# Patient Record
Sex: FEMALE | Race: WHITE | NOT HISPANIC OR LATINO | Employment: OTHER | URBAN - METROPOLITAN AREA
[De-identification: names, ages, dates, MRNs, and addresses within clinical notes are randomized per-mention and may not be internally consistent; named-entity substitution may affect disease eponyms.]

---

## 2017-07-07 ENCOUNTER — OFFICE VISIT (OUTPATIENT)
Dept: CARDIOLOGY | Facility: CLINIC | Age: 82
End: 2017-07-07
Payer: MEDICARE

## 2017-07-07 VITALS
HEART RATE: 93 BPM | HEIGHT: 63 IN | SYSTOLIC BLOOD PRESSURE: 106 MMHG | BODY MASS INDEX: 29.06 KG/M2 | WEIGHT: 164 LBS | DIASTOLIC BLOOD PRESSURE: 66 MMHG

## 2017-07-07 DIAGNOSIS — I48.20 CHRONIC ATRIAL FIBRILLATION (H): Primary | ICD-10-CM

## 2017-07-07 DIAGNOSIS — I10 BENIGN ESSENTIAL HYPERTENSION: ICD-10-CM

## 2017-07-07 PROCEDURE — 99213 OFFICE O/P EST LOW 20 MIN: CPT | Performed by: INTERNAL MEDICINE

## 2017-07-07 NOTE — MR AVS SNAPSHOT
"              After Visit Summary   7/7/2017    Marcia Gary    MRN: [id number]           Patient Information     Date Of Birth          1935        Visit Information        Provider Department      7/7/2017 2:15 PM Joseph Tolliver MD [facility]        Today's Diagnoses     Chronic atrial fibrillation (H)    -  1    Benign essential hypertension           Follow-ups after your visit        Additional Services     Follow-Up with Cardiologist                 Future tests that were ordered for you today     Open Future Orders        Priority Expected Expires Ordered    Follow-Up with Cardiologist Routine 7/7/2018 11/19/2018 7/7/2017            Who to contact     If you have questions or need follow up information about today's clinic visit or your schedule please contact [facility] directly at [phone].  Normal or non-critical lab and imaging results will be communicated to you by [employer], letter or phone within 4 business days after the clinic has received the results. If you do not hear from us within 7 days, please contact the clinic through [employer] or phone. If you have a critical or abnormal lab result, we will notify you by phone as soon as possible.  Submit refill requests through [employer] or call your pharmacy and they will forward the refill request to us. Please allow 3 business days for your refill to be completed.          Additional Information About Your Visit        [employer] Information     [employer] lets you send messages to your doctor, view your test results, renew your prescriptions, schedule appointments and more. To sign up, go to [email or web address] . Click on \"Log in\" on the left side of the screen, which will take you to the Welcome page. Then click on \"Sign up Now\" on the right side of the page.     You will be asked to enter the access code listed below, as well as some personal information. " "Please follow the directions to create your username and password.     Your access code is: [email or web address]  Expires: 10/5/2017  2:42 PM     Your access code will  in 90 days. If you need help or a new code, please call your [place] clinic or [phone].        Care EveryWhere ID     This is your Care EveryWhere ID. This could be used by other organizations to access your [place] medical records  [id number]        Your Vitals Were     Pulse Height BMI (Body Mass Index)             93 1.6 m (5' 3\") 29.05 kg/m2          Blood Pressure from Last 3 Encounters:   17 106/66   16 122/60   05/05/15 118/64    Weight from Last 3 Encounters:   17 74.4 kg (164 lb)   16 76.7 kg (169 lb)   05/05/15 81.6 kg (180 lb)               Primary Care Provider Office Phone # Fax #    Joseph Pelayo [phone]       [facility] [address]  [place] [address]        Equal Access to Services     [facility] AH: Hadii aad ku hadasho Soomaali, waaxda luqadaha, qaybta kaalmada adeegyada, ricki rodriguez . So [facility] [phone].    ATENCIÓN: Si habla español, tiene a love disposición servicios gratuitos de asistencia lingüística. Llame al [phone].    We comply with applicable federal civil rights laws and [place] laws. We do not discriminate on the basis of race, color, national origin, age, disability sex, sexual orientation or gender identity.            Thank you!     Thank you for choosing [facility] [place]  for your care. Our goal is always to provide you with excellent care. Hearing back from our patients is one way we can continue to improve our services. Please take a few minutes to complete the written survey that you may receive in the mail after your visit with us. Thank you!             Your Updated Medication List - Protect others around you: Learn how to safely use, store and throw away your " medicines at www.disposemymeds.org.          This list is accurate as of: 7/7/17  2:42 PM.  Always use your most recent med list.                   Brand Name Dispense Instructions for use Diagnosis    calcium citrate-vitamin D 315-200 MG-UNIT Tabs per tablet    CITRACAL     Take 1 tablet by mouth daily        diltiazem 240 MG 24 hr capsule     90 capsule    Take 1 capsule (240 mg) by mouth daily    Essential hypertension, benign       ferrous sulfate 325 (65 FE) MG tablet    IRON     Take 325 mg by mouth Once a week        MULTI COMPLETE PO      approx once a week        OMEPRAZOLE PO      Take 20 mg by mouth 2 times daily (before meals)        rivaroxaban ANTICOAGULANT 15 MG Tabs tablet    XARELTO    90 tablet    Take 1 tablet (15 mg) by mouth daily (with dinner)    Paroxysmal atrial fibrillation (H)       VITAMIN D3 PO      Take 1,000 Units by mouth daily

## 2017-07-07 NOTE — LETTER
2017  Joseph Pelayo MD   [facility]   [address]   [place] [address]       RE:                Marcia Gary   MRN:             [id number]   :             1935       Dear Dr. Pelayo:       Marcia Gary, a 80-year-old woman with chronic atrial fibrillation, hypertension, dyslipidemia, scleroderma, rheumatoid arthritis and chronic kidney disease was seen today at your request for followup.       Ms. Gary has chronic atrial fibrillation with a CHADS-VASc score of 3.  Her creatinine clearance of 42 and she is currently on rivaroxaban 15 mg a day and diltiazem for rate control.       Since last seen, the patient denies chest, arm, neck, jaw or back discomfort with exertion or focal neurologic deficit in strength or sensation.      The patient still mows her lawn on a regular basis and is able to do so without limitation.       PAST MEDICAL HISTORY:   1.  Chronic atrial fibrillation.   2.  Hypertension.   3.  Sleep apnea.   4.  Rheumatoid arthritis.   5.  Scleroderma.   6.  Chronic kidney disease.       PHYSICAL EXAMINATION:   GENERAL:  Exam today demonstrates a very pleasant, cooperative and intelligent 80-year-old woman.   VITAL SIGNS:  Her blood pressure is 122/60, heart rate is 84 and irregular.  Her height is 1.6 meters.  Her weight is 77 kg.  Her BMI is 29.8.   LUNGS:  Clear to percussion and auscultation.   CARDIOVASCULAR:  Normal S1 with a normal S2, there is no S3.  There is no murmur, rub or click.       The patient describes a chronic cough without hemoptysis or purulent sputum.  There has been no exertional dyspnea.       ASSESSMENT:  Ms. Gary is free of cardiovascular symptoms.  Her ventricular response rate is well controlled.  She has been free of focal neurologic deficit.  Her systolic blood pressure is optimal.         I suggested she should continue to follow up with her primary care doctor on a regular basis.       RECOMMENDATIONS:   1.  Continue present cardiac  medications.   2.  Prescriptions have been refilled.   3.  Followup visit with me in 1 year.          We greatly appreciate the opportunity to see your patient, Ms. Abdirahman.       Sincerely,             SHANNON MALLOY MD

## 2017-07-07 NOTE — PROGRESS NOTES
2017  Joseph Pelayo MD   [facility]   [address]   [place] [address]       RE:                Marcia Gary   MRN:             [id number]   :             1935       Dear Dr. Pelayo:       Marcia Gary, a 80-year-old woman with chronic atrial fibrillation, hypertension, dyslipidemia, scleroderma, rheumatoid arthritis and chronic kidney disease was seen today at your request for followup.       Ms. Gary has chronic atrial fibrillation with a CHADS-VASc score of 3.  Her creatinine clearance of 42 and she is currently on rivaroxaban 15 mg a day and diltiazem for rate control.       Since last seen, the patient denies chest, arm, neck, jaw or back discomfort with exertion or focal neurologic deficit in strength or sensation.      The patient still mows her lawn on a regular basis and is able to do so without limitation.       PAST MEDICAL HISTORY:   1.  Chronic atrial fibrillation.   2.  Hypertension.   3.  Sleep apnea.   4.  Rheumatoid arthritis.   5.  Scleroderma.   6.  Chronic kidney disease.       PHYSICAL EXAMINATION:   GENERAL:  Exam today demonstrates a very pleasant, cooperative and intelligent 80-year-old woman.   VITAL SIGNS:  Her blood pressure is 122/60, heart rate is 84 and irregular.  Her height is 1.6 meters.  Her weight is 77 kg.  Her BMI is 29.8.   LUNGS:  Clear to percussion and auscultation.   CARDIOVASCULAR:  Normal S1 with a normal S2, there is no S3.  There is no murmur, rub or click.       The patient describes a chronic cough without hemoptysis or purulent sputum.  There has been no exertional dyspnea.       ASSESSMENT:  Ms. Gary is free of cardiovascular symptoms.  Her ventricular response rate is well controlled.  She has been free of focal neurologic deficit.  Her systolic blood pressure is optimal.         I suggested she should continue to follow up with her primary care doctor on a regular basis.       RECOMMENDATIONS:   1.  Continue present cardiac  medications.   2.  Prescriptions have been refilled.   3.  Followup visit with me in 1 year.          We greatly appreciate the opportunity to see your patient, Ms. Abdirahman.       Sincerely,             SHANNON MALLOY MD

## 2017-07-10 DIAGNOSIS — I10 ESSENTIAL HYPERTENSION, BENIGN: ICD-10-CM

## 2017-07-10 DIAGNOSIS — I48.0 PAROXYSMAL ATRIAL FIBRILLATION (H): ICD-10-CM

## 2017-07-10 RX ORDER — DILTIAZEM HYDROCHLORIDE 240 MG/1
240 CAPSULE, COATED, EXTENDED RELEASE ORAL DAILY
Qty: 90 CAPSULE | Refills: 3 | Status: SHIPPED | OUTPATIENT
Start: 2017-07-10 | End: 2018-06-28

## 2018-02-09 ENCOUNTER — TRANSFERRED RECORDS (OUTPATIENT)
Dept: HEALTH INFORMATION MANAGEMENT | Facility: CLINIC | Age: 83
End: 2018-02-09

## 2018-06-28 DIAGNOSIS — I10 ESSENTIAL HYPERTENSION, BENIGN: ICD-10-CM

## 2018-06-28 DIAGNOSIS — I48.0 PAROXYSMAL ATRIAL FIBRILLATION (H): ICD-10-CM

## 2018-06-28 RX ORDER — DILTIAZEM HYDROCHLORIDE 240 MG/1
240 CAPSULE, COATED, EXTENDED RELEASE ORAL DAILY
Qty: 90 CAPSULE | Refills: 0 | Status: SHIPPED | OUTPATIENT
Start: 2018-06-28 | End: 2018-09-11 | Stop reason: ALTCHOICE

## 2018-09-11 ENCOUNTER — OFFICE VISIT (OUTPATIENT)
Dept: CARDIOLOGY | Facility: CLINIC | Age: 83
End: 2018-09-11
Attending: INTERNAL MEDICINE
Payer: MEDICARE

## 2018-09-11 VITALS
WEIGHT: 170 LBS | HEIGHT: 63 IN | SYSTOLIC BLOOD PRESSURE: 107 MMHG | BODY MASS INDEX: 30.12 KG/M2 | DIASTOLIC BLOOD PRESSURE: 58 MMHG | HEART RATE: 70 BPM

## 2018-09-11 DIAGNOSIS — I48.20 CHRONIC ATRIAL FIBRILLATION (H): ICD-10-CM

## 2018-09-11 DIAGNOSIS — I48.0 PAROXYSMAL ATRIAL FIBRILLATION (H): ICD-10-CM

## 2018-09-11 DIAGNOSIS — I10 BENIGN ESSENTIAL HYPERTENSION: ICD-10-CM

## 2018-09-11 PROCEDURE — 99213 OFFICE O/P EST LOW 20 MIN: CPT | Performed by: INTERNAL MEDICINE

## 2018-09-11 RX ORDER — DILTIAZEM HYDROCHLORIDE 240 MG/1
240 CAPSULE, COATED, EXTENDED RELEASE ORAL DAILY
Qty: 30 CAPSULE | Refills: 11 | Status: SHIPPED | OUTPATIENT
Start: 2018-09-11 | End: 2018-10-23

## 2018-09-11 RX ORDER — DILTIAZEM HYDROCHLORIDE 240 MG/1
240 CAPSULE, COATED, EXTENDED RELEASE ORAL DAILY
COMMUNITY
End: 2018-09-11

## 2018-09-11 NOTE — MR AVS SNAPSHOT
"              After Visit Summary   9/11/2018    Marcia Gary    MRN: [id number]           Patient Information     Date Of Birth          1935        Visit Information        Provider Department      9/11/2018 1:15 PM Joseph Tolliver MD [facility]        Today's Diagnoses     Benign essential hypertension        Chronic atrial fibrillation (H)        Paroxysmal atrial fibrillation (H)           Follow-ups after your visit        Who to contact     If you have questions or need follow up information about today's clinic visit or your schedule please contact [facility] directly at [phone].  Normal or non-critical lab and imaging results will be communicated to you by MyChart, letter or phone within 4 business days after the clinic has received the results. If you do not hear from us within 7 days, please contact the clinic through MyChart or phone. If you have a critical or abnormal lab result, we will notify you by phone as soon as possible.  Submit refill requests through [employer] or call your pharmacy and they will forward the refill request to us. Please allow 3 business days for your refill to be completed.          Additional Information About Your Visit        Care EveryWhere ID     This is your Care EveryWhere ID. This could be used by other organizations to access your [place] medical records  [id number]        Your Vitals Were     Pulse Height BMI (Body Mass Index)             70 1.6 m (5' 3\") 30.11 kg/m2          Blood Pressure from Last 3 Encounters:   09/11/18 107/58   07/07/17 106/66   05/27/16 122/60    Weight from Last 3 Encounters:   09/11/18 77.1 kg (170 lb)   07/07/17 74.4 kg (164 lb)   05/27/16 76.7 kg (169 lb)              We Performed the Following     Follow-Up with Cardiologist          Today's Medication Changes          These changes are accurate as of 9/11/18  1:44 PM.  If you have any " questions, ask your nurse or doctor.               Start taking these medicines.        Dose/Directions    rivaroxaban ANTICOAGULANT 15 MG Tabs tablet   Commonly known as:  XARELTO   Used for:  Paroxysmal atrial fibrillation (H)   Started by:  Joseph Tolliver MD        Dose:  15 mg   Take 1 tablet (15 mg) by mouth daily (with dinner)   Quantity:  90 tablet   Refills:  11         These medicines have changed or have updated prescriptions.        Dose/Directions    diltiazem 240 MG 24 hr capsule   Commonly known as:  CARTIA XT   This may have changed:  Another medication with the same name was removed. Continue taking this medication, and follow the directions you see here.   Used for:  Benign essential hypertension, Chronic atrial fibrillation (H), Paroxysmal atrial fibrillation (H)   Changed by:  Joseph Tolliver MD        Dose:  240 mg   Take 1 capsule (240 mg) by mouth daily   Quantity:  30 capsule   Refills:  11            Where to get your medicines      These medications were sent to [address]  [address] [address]     Phone:  [phone]     diltiazem 240 MG 24 hr capsule    rivaroxaban ANTICOAGULANT 15 MG Tabs tablet                Primary Care Provider Office Phone # Fax #    Joseph Pelayo [phone]       [facility] [address] [address]        Equal Access to Services     [facility] AH: Hadii amalia ku hadasho Soomaali, waaxda luqadaha, qaybta kaalmada adeegyada, waxay karenin hayaan jonas gonsales. So [facility] [phone].    ATENCIÓN: Si habla español, tiene a love disposición servicios gratuitos de asistencia lingüística. Llame al [phone].    We comply with applicable federal civil rights laws and [place] laws. We do not discriminate on the basis of race, color, national origin, age, disability, sex, sexual orientation, or gender identity.            Thank you!      Thank you for choosing [facility]  for your care. Our goal is always to provide you with excellent care. Hearing back from our patients is one way we can continue to improve our services. Please take a few minutes to complete the written survey that you may receive in the mail after your visit with us. Thank you!             Your Updated Medication List - Protect others around you: Learn how to safely use, store and throw away your medicines at www.disposemymeds.org.          This list is accurate as of 9/11/18  1:44 PM.  Always use your most recent med list.                   Brand Name Dispense Instructions for use Diagnosis    calcium citrate-vitamin D 315-200 MG-UNIT Tabs per tablet    CITRACAL     Take 1 tablet by mouth daily        diltiazem 240 MG 24 hr capsule    CARTIA XT    30 capsule    Take 1 capsule (240 mg) by mouth daily    Benign essential hypertension, Chronic atrial fibrillation (H), Paroxysmal atrial fibrillation (H)       ferrous sulfate 325 (65 Fe) MG tablet    IRON     Take 325 mg by mouth Once a week        MULTI COMPLETE PO      approx once a week        OMEPRAZOLE PO      Take 20 mg by mouth 2 times daily (before meals)        rivaroxaban ANTICOAGULANT 15 MG Tabs tablet    XARELTO    90 tablet    Take 1 tablet (15 mg) by mouth daily (with dinner)    Paroxysmal atrial fibrillation (H)       VITAMIN D3 PO      Take 1,000 Units by mouth daily

## 2018-09-11 NOTE — PROGRESS NOTES
HISTORY OF PRESENT ILLNESS:  No symptoms. Still mows very active    Orders this Visit:  No orders of the defined types were placed in this encounter.    Orders Placed This Encounter   Medications     DISCONTD: diltiazem (CARTIA XT) 240 MG 24 hr capsule     Sig: Take 240 mg by mouth daily     DISCONTD: rivaroxaban ANTICOAGULANT (XARELTO) 15 MG TABS tablet     Sig: Take 1 tablet (15 mg) by mouth daily (with dinner)     Dispense:  90 tablet     Refill:  0     rivaroxaban ANTICOAGULANT (XARELTO) 15 MG TABS tablet     Sig: Take 1 tablet (15 mg) by mouth daily (with dinner)     Dispense:  90 tablet     Refill:  11     diltiazem (CARTIA XT) 240 MG 24 hr capsule     Sig: Take 1 capsule (240 mg) by mouth daily     Dispense:  30 capsule     Refill:  11     Medications Discontinued During This Encounter   Medication Reason     diltiazem 240 MG 24 hr capsule Alternate therapy     rivaroxaban ANTICOAGULANT (XARELTO) 15 MG TABS tablet Reorder     rivaroxaban ANTICOAGULANT (XARELTO) 15 MG TABS tablet Reorder     diltiazem (CARTIA XT) 240 MG 24 hr capsule Reorder       Encounter Diagnoses   Name Primary?     Benign essential hypertension      Chronic atrial fibrillation (H)      Paroxysmal atrial fibrillation (H)        CURRENT MEDICATIONS:  Current Outpatient Prescriptions   Medication Sig Dispense Refill     Cholecalciferol (VITAMIN D3 PO) Take 1,000 Units by mouth daily       diltiazem (CARTIA XT) 240 MG 24 hr capsule Take 1 capsule (240 mg) by mouth daily 30 capsule 11     ferrous sulfate (IRON) 325 (65 FE) MG tablet Take 325 mg by mouth Once a week       Multiple Vitamins-Minerals (MULTI COMPLETE PO) approx once a week       rivaroxaban ANTICOAGULANT (XARELTO) 15 MG TABS tablet Take 1 tablet (15 mg) by mouth daily (with dinner) 90 tablet 11     calcium citrate-vitamin D (CITRACAL) 315-200 MG-UNIT TABS Take 1 tablet by mouth daily       OMEPRAZOLE PO Take 20 mg by mouth 2 times daily (before meals)       [DISCONTINUED]  "diltiazem (CARTIA XT) 240 MG 24 hr capsule Take 240 mg by mouth daily       [DISCONTINUED] diltiazem 240 MG 24 hr capsule Take 1 capsule (240 mg) by mouth daily 90 capsule 0     [DISCONTINUED] rivaroxaban ANTICOAGULANT (XARELTO) 15 MG TABS tablet Take 1 tablet (15 mg) by mouth daily (with dinner) 90 tablet 0     [DISCONTINUED] rivaroxaban ANTICOAGULANT (XARELTO) 15 MG TABS tablet Take 1 tablet (15 mg) by mouth daily (with dinner) 90 tablet 0       ALLERGIES     Allergies   Allergen Reactions     Amoxicillin      Augmentin      Cephalexin      Smz-Tmp Ds [Sulfamethoxazole W/Trimethoprim] Rash     Itching. (800/160 mg twice day)       PAST MEDICAL, SURGICAL, FAMILY, SOCIAL HISTORY:  History was reviewed and updated as needed, see medical record.    Review of Systems:  A 12-point review of systems was completed, see medical record for detailed review of systems information.    Physical Exam:  Vitals: /58  Pulse 70  Ht 1.6 m (5' 3\")  Wt 77.1 kg (170 lb)  BMI 30.11 kg/m2    Constitutional:  cooperative, alert and oriented, well developed, well nourished, in no acute distress overweight      Skin:  warm and dry to the touch, no apparent skin lesions or masses noted        Head:  normocephalic, no masses or lesions        Eyes:  pupils equal and round, conjunctivae and lids unremarkable, sclera white, no xanthalasma, EOMS intact, no nystagmus        ENT:  no pallor or cyanosis, dentition good        Neck:  carotid pulses are full and equal bilaterally, JVP normal, no carotid bruit        Chest:  normal breath sounds, clear to auscultation, normal A-P diameter, normal symmetry, normal respiratory excursion, no use of accessory muscles        Cardiac:   irregular rhythm                Abdomen:  abdomen soft, non-tender, BS normoactive, no mass, no HSM, no bruits        Vascular:                                        Extremities and Back:  no deformities, clubbing, cyanosis, erythema observed        Neurological:  " no gross motor deficits        ASSESSMENT: stable       RECOMMENDATIONS:  Prn visits      Recent Lab Results:  LIPID RESULTS:  No results found for: CHOL, HDL, LDL, TRIG, CHOLHDLRATIO    LIVER ENZYME RESULTS:  No results found for: AST, ALT    CBC RESULTS:  Lab Results   Component Value Date    WBC 10.1 04/10/2015    RBC 4.14 04/10/2015    HGB 13.2 04/10/2015    HCT 38.5 04/10/2015    MCV 92.9 04/10/2015    MCH 31.8 (A) 04/10/2015    MCHC 34.2 04/10/2015    RDW 14.1 04/10/2015     04/10/2015     03/26/2010       BMP RESULTS:  Lab Results   Component Value Date     02/19/2013    POTASSIUM 4.8 02/19/2013    CHLORIDE 104 02/19/2013    CO2 29 02/19/2013    ANIONGAP 6 03/26/2010    GLC 97 02/19/2013    BUN 36 (H) 02/19/2013    CR 1.3 02/19/2013    GFRESTIMATED 46 (L) 03/26/2010    GFRESTBLACK 56 (L) 03/26/2010    ARABELLA 9.9 02/19/2013        A1C RESULTS:  No results found for: A1C    INR RESULTS:  Lab Results   Component Value Date    INR 1.44 (H) 07/22/2008    INR 1.53 (H) 07/02/2008       We greatly appreciate the opportunity to be involved in the care of your patient, Marcia Gary.    Sincerely,  Joseph Tolliver MD        Joseph Tolliver MD  [address]  [place] [address]

## 2018-09-11 NOTE — LETTER
9/11/2018    Joseph Pelayo MD  [facility] [address]  [place] [address]    RE: Marcia Gary       Dear Colleague,    I had the pleasure of seeing Marcia Gary in the [facility].    HISTORY OF PRESENT ILLNESS:  No symptoms. Still mows very active    Orders this Visit:  No orders of the defined types were placed in this encounter.    Orders Placed This Encounter   Medications     DISCONTD: diltiazem (CARTIA XT) 240 MG 24 hr capsule     Sig: Take 240 mg by mouth daily     DISCONTD: rivaroxaban ANTICOAGULANT (XARELTO) 15 MG TABS tablet     Sig: Take 1 tablet (15 mg) by mouth daily (with dinner)     Dispense:  90 tablet     Refill:  0     rivaroxaban ANTICOAGULANT (XARELTO) 15 MG TABS tablet     Sig: Take 1 tablet (15 mg) by mouth daily (with dinner)     Dispense:  90 tablet     Refill:  11     diltiazem (CARTIA XT) 240 MG 24 hr capsule     Sig: Take 1 capsule (240 mg) by mouth daily     Dispense:  30 capsule     Refill:  11     Medications Discontinued During This Encounter   Medication Reason     diltiazem 240 MG 24 hr capsule Alternate therapy     rivaroxaban ANTICOAGULANT (XARELTO) 15 MG TABS tablet Reorder     rivaroxaban ANTICOAGULANT (XARELTO) 15 MG TABS tablet Reorder     diltiazem (CARTIA XT) 240 MG 24 hr capsule Reorder       Encounter Diagnoses   Name Primary?     Benign essential hypertension      Chronic atrial fibrillation (H)      Paroxysmal atrial fibrillation (H)        CURRENT MEDICATIONS:  Current Outpatient Prescriptions   Medication Sig Dispense Refill     Cholecalciferol (VITAMIN D3 PO) Take 1,000 Units by mouth daily       diltiazem (CARTIA XT) 240 MG 24 hr capsule Take 1 capsule (240 mg) by mouth daily 30 capsule 11     ferrous sulfate (IRON) 325 (65 FE) MG tablet Take 325 mg by mouth Once a week       Multiple Vitamins-Minerals (MULTI COMPLETE PO) approx once a week       rivaroxaban ANTICOAGULANT (XARELTO) 15 MG TABS tablet Take 1  "tablet (15 mg) by mouth daily (with dinner) 90 tablet 11     calcium citrate-vitamin D (CITRACAL) 315-200 MG-UNIT TABS Take 1 tablet by mouth daily       OMEPRAZOLE PO Take 20 mg by mouth 2 times daily (before meals)       [DISCONTINUED] diltiazem (CARTIA XT) 240 MG 24 hr capsule Take 240 mg by mouth daily       [DISCONTINUED] diltiazem 240 MG 24 hr capsule Take 1 capsule (240 mg) by mouth daily 90 capsule 0     [DISCONTINUED] rivaroxaban ANTICOAGULANT (XARELTO) 15 MG TABS tablet Take 1 tablet (15 mg) by mouth daily (with dinner) 90 tablet 0     [DISCONTINUED] rivaroxaban ANTICOAGULANT (XARELTO) 15 MG TABS tablet Take 1 tablet (15 mg) by mouth daily (with dinner) 90 tablet 0       ALLERGIES     Allergies   Allergen Reactions     Amoxicillin      Augmentin      Cephalexin      Smz-Tmp Ds [Sulfamethoxazole W/Trimethoprim] Rash     Itching. (800/160 mg twice day)       PAST MEDICAL, SURGICAL, FAMILY, SOCIAL HISTORY:  History was reviewed and updated as needed, see medical record.    Review of Systems:  A 12-point review of systems was completed, see medical record for detailed review of systems information.    Physical Exam:  Vitals: /58  Pulse 70  Ht 1.6 m (5' 3\")  Wt 77.1 kg (170 lb)  BMI 30.11 kg/m2    Constitutional:  cooperative, alert and oriented, well developed, well nourished, in no acute distress overweight      Skin:  warm and dry to the touch, no apparent skin lesions or masses noted        Head:  normocephalic, no masses or lesions        Eyes:  pupils equal and round, conjunctivae and lids unremarkable, sclera white, no xanthalasma, EOMS intact, no nystagmus        ENT:  no pallor or cyanosis, dentition good        Neck:  carotid pulses are full and equal bilaterally, JVP normal, no carotid bruit        Chest:  normal breath sounds, clear to auscultation, normal A-P diameter, normal symmetry, normal respiratory excursion, no use of accessory muscles        Cardiac:   irregular rhythm            "     Abdomen:  abdomen soft, non-tender, BS normoactive, no mass, no HSM, no bruits        Vascular:                                        Extremities and Back:  no deformities, clubbing, cyanosis, erythema observed        Neurological:  no gross motor deficits        ASSESSMENT: stable       RECOMMENDATIONS:  Prn visits      Recent Lab Results:  LIPID RESULTS:  No results found for: CHOL, HDL, LDL, TRIG, CHOLHDLRATIO    LIVER ENZYME RESULTS:  No results found for: AST, ALT    CBC RESULTS:  Lab Results   Component Value Date    WBC 10.1 04/10/2015    RBC 4.14 04/10/2015    HGB 13.2 04/10/2015    HCT 38.5 04/10/2015    MCV 92.9 04/10/2015    MCH 31.8 (A) 04/10/2015    MCHC 34.2 04/10/2015    RDW 14.1 04/10/2015     04/10/2015     03/26/2010       BMP RESULTS:  Lab Results   Component Value Date     02/19/2013    POTASSIUM 4.8 02/19/2013    CHLORIDE 104 02/19/2013    CO2 29 02/19/2013    ANIONGAP 6 03/26/2010    GLC 97 02/19/2013    BUN 36 (H) 02/19/2013    CR 1.3 02/19/2013    GFRESTIMATED 46 (L) 03/26/2010    GFRESTBLACK 56 (L) 03/26/2010    ARABELLA 9.9 02/19/2013        A1C RESULTS:  No results found for: A1C    INR RESULTS:  Lab Results   Component Value Date    INR 1.44 (H) 07/22/2008    INR 1.53 (H) 07/02/2008       We greatly appreciate the opportunity to be involved in the care of your patient, Marcia Gary.    Sincerely,  oJseph Tolliver MD      CC  Joseph Tolliver MD  [address]  [place] [address]                                                                       Thank you for allowing me to participate in the care of your patient.      Sincerely,     Joseph Tolliver MD     [facility]    cc:   Joseph Tolliver MD  [address]  [place] [address]

## 2018-09-11 NOTE — PROGRESS NOTES
Service Date: 09/11/2018      HISTORY OF PRESENT ILLNESS:  Marcia Gary, an 82-year-old woman with chronic atrial fibrillation, hypertension, dyslipidemia, scleroderma, rheumatoid arthritis, and chronic kidney disease was seen today at your request for followup.      Mrs. Gary has chronic atrial fibrillation with a CHADS-VASc score equal to 3.  Her creatinine clearance has been 42 and she is currently on rivaroxaban 15 mg daily and oral  diltiazem for rate control.      Since last being seen, the patient remains free of symptoms.  She mows her lawn and performs other moderately vigorous activities without limitations.      PAST MEDICAL HISTORY:   1.  Chronic atrial fibrillation.   2.  Hypertension.   3.  Sleep apnea.   4.  Rheumatoid arthritis.   5.  Scleroderma.   6.  Chronic kidney disease.      PHYSICAL EXAMINATION:   GENERAL:  Exam today demonstrates a very vigorous pleasant, intelligent 82-year-old woman.   VITAL SIGNS:  Her blood pressure is 107/50, heart rate 70 and irregular.  Her height is 1.6 meters.  Her weight is 77.1 kg, BMI is 30.2.   LUNGS:  Clear to percussion and auscultation.   CARDIOVASCULAR:  An irregular S1 and S2, there is no S3.  There is no murmur, rub or click.      ASSESSMENT:  Mrs. Gary is asymptomatic with good rate control on anticoagulation for management of chronic atrial fibrillation.  Her systolic blood pressure is well controlled.  She has no cardiac symptoms at this time. She receives excellent care from  and his associates.     RECOMMENDATIONS:   1.  Continue present therapy.   2.  I would advise that you check the patient's creatinine clearance on an annual basis in view of her NOAC therapy and current age.  3.  At the patient's request, she will see us on a prn basis only.       We have appreciated the opportunity to see your patient, Marcia Gary.      cc:   Joseph Pelayo MD   [facility]   [address]   [address]         SHANNON MALLOY MD             D: 2018   T: 2018   MT: OTIS      Name:     MARGARITA GÓMEZ   MRN:      [id number]        Account:      [id number]   :      1935           Service Date: 2018      Document: [id number]

## 2018-09-11 NOTE — LETTER
9/11/2018      [facility] [address]  [facility] [address]      RE: Marcia Gary       Dear Colleague,    I had the pleasure of seeing Marcia Gary in the [facility].    Service Date: 09/11/2018      HISTORY OF PRESENT ILLNESS:  Marcia Gary, an 82-year-old woman with chronic atrial fibrillation, hypertension, dyslipidemia, scleroderma, rheumatoid arthritis, and chronic kidney disease was seen today at your request for followup.      Mrs. Gary has chronic atrial fibrillation with a CHADS-VASc score equal to 3.  Her creatinine clearance has been 42 and she is currently on rivaroxaban 15 mg daily and oral  diltiazem for rate control.      Since last being seen, the patient remains free of symptoms.  She mows her lawn and performs other moderately vigorous activities without limitations.      PAST MEDICAL HISTORY:   1.  Chronic atrial fibrillation.   2.  Hypertension.   3.  Sleep apnea.   4.  Rheumatoid arthritis.   5.  Scleroderma.   6.  Chronic kidney disease.      PHYSICAL EXAMINATION:   GENERAL:  Exam today demonstrates a very vigorous pleasant, intelligent 82-year-old woman.   VITAL SIGNS:  Her blood pressure is 107/50, heart rate 70 and irregular.  Her height is 1.6 meters.  Her weight is 77.1 kg, BMI is 30.2.   LUNGS:  Clear to percussion and auscultation.   CARDIOVASCULAR:  An irregular S1 and S2, there is no S3.  There is no murmur, rub or click.      ASSESSMENT:  Mrs. Gary is asymptomatic with good rate control on anticoagulation for management of chronic atrial fibrillation.  Her systolic blood pressure is well controlled.  She has no cardiac symptoms at this time. She receives excellent care from  and his associates.     RECOMMENDATIONS:   1.  Continue present therapy.   2.  I would advise that you check the patient's creatinine clearance on an annual basis in view of her NOAC therapy and current age.  3.  At the  patient's request, she will see us on a prn basis only.       We have appreciated the opportunity to see your patient, Margarita Gary.      cc:   Joseph Pelayo MD   [facility]   [address] [place] [address]         JOSEPH TOLLIVER MD             D: 2018   T: 2018   MT: OTIS      Name:     MARGARITA GARY   MRN:      [id number]        Account:      [id number]   :      1935           Service Date: 2018      Document: [id number]           Outpatient Encounter Prescriptions as of 2018   Medication Sig Dispense Refill     Cholecalciferol (VITAMIN D3 PO) Take 1,000 Units by mouth daily       diltiazem (CARTIA XT) 240 MG 24 hr capsule Take 1 capsule (240 mg) by mouth daily 30 capsule 11     ferrous sulfate (IRON) 325 (65 FE) MG tablet Take 325 mg by mouth Once a week       Multiple Vitamins-Minerals (MULTI COMPLETE PO) approx once a week       rivaroxaban ANTICOAGULANT (XARELTO) 15 MG TABS tablet Take 1 tablet (15 mg) by mouth daily (with dinner) 90 tablet 11     calcium citrate-vitamin D (CITRACAL) 315-200 MG-UNIT TABS Take 1 tablet by mouth daily       OMEPRAZOLE PO Take 20 mg by mouth 2 times daily (before meals)       [DISCONTINUED] diltiazem (CARTIA XT) 240 MG 24 hr capsule Take 240 mg by mouth daily       [DISCONTINUED] diltiazem 240 MG 24 hr capsule Take 1 capsule (240 mg) by mouth daily 90 capsule 0     [DISCONTINUED] rivaroxaban ANTICOAGULANT (XARELTO) 15 MG TABS tablet Take 1 tablet (15 mg) by mouth daily (with dinner) 90 tablet 0     [DISCONTINUED] rivaroxaban ANTICOAGULANT (XARELTO) 15 MG TABS tablet Take 1 tablet (15 mg) by mouth daily (with dinner) 90 tablet 0     No facility-administered encounter medications on file as of 2018.        Again, thank you for allowing me to participate in the care of your patient.      Sincerely,    Joseph Tolliver MD     [facility]

## 2018-10-23 DIAGNOSIS — I48.0 PAROXYSMAL ATRIAL FIBRILLATION (H): ICD-10-CM

## 2018-10-23 DIAGNOSIS — I48.20 CHRONIC ATRIAL FIBRILLATION (H): ICD-10-CM

## 2018-10-23 DIAGNOSIS — I10 BENIGN ESSENTIAL HYPERTENSION: ICD-10-CM

## 2018-10-23 RX ORDER — DILTIAZEM HYDROCHLORIDE 240 MG/1
240 CAPSULE, COATED, EXTENDED RELEASE ORAL DAILY
Qty: 90 CAPSULE | Refills: 3 | Status: SHIPPED | OUTPATIENT
Start: 2018-10-23

## 2019-05-02 ENCOUNTER — TRANSFERRED RECORDS (OUTPATIENT)
Dept: HEALTH INFORMATION MANAGEMENT | Facility: CLINIC | Age: 84
End: 2019-05-02

## 2019-05-16 ENCOUNTER — MEDICAL CORRESPONDENCE (OUTPATIENT)
Dept: ULTRASOUND IMAGING | Facility: CLINIC | Age: 84
End: 2019-05-16

## 2019-05-20 ENCOUNTER — HOSPITAL ENCOUNTER (OUTPATIENT)
Dept: ULTRASOUND IMAGING | Facility: CLINIC | Age: 84
Discharge: HOME | End: 2019-05-20
Attending: INTERNAL MEDICINE | Admitting: INTERNAL MEDICINE
Payer: MEDICARE

## 2019-05-20 DIAGNOSIS — I10 HYPERTENSION: ICD-10-CM

## 2019-05-20 DIAGNOSIS — N18.30 STAGE 3 CHRONIC KIDNEY DISEASE (H): ICD-10-CM

## 2019-05-20 PROCEDURE — 76770 US EXAM ABDO BACK WALL COMP: CPT

## 2019-06-18 ENCOUNTER — TRANSFERRED RECORDS (OUTPATIENT)
Dept: HEALTH INFORMATION MANAGEMENT | Facility: CLINIC | Age: 84
End: 2019-06-18

## 2019-06-27 ENCOUNTER — OFFICE VISIT (OUTPATIENT)
Dept: CARDIOLOGY | Facility: CLINIC | Age: 84
End: 2019-06-27
Payer: MEDICARE

## 2019-06-27 VITALS
HEIGHT: 63 IN | HEART RATE: 81 BPM | SYSTOLIC BLOOD PRESSURE: 118 MMHG | BODY MASS INDEX: 30.3 KG/M2 | WEIGHT: 171 LBS | DIASTOLIC BLOOD PRESSURE: 60 MMHG | OXYGEN SATURATION: 99 %

## 2019-06-27 DIAGNOSIS — I10 BENIGN ESSENTIAL HYPERTENSION: ICD-10-CM

## 2019-06-27 DIAGNOSIS — I48.20 CHRONIC ATRIAL FIBRILLATION (H): ICD-10-CM

## 2019-06-27 DIAGNOSIS — I25.10 CORONARY ARTERY DISEASE INVOLVING NATIVE CORONARY ARTERY OF NATIVE HEART WITHOUT ANGINA PECTORIS: Primary | ICD-10-CM

## 2019-06-27 PROCEDURE — 99214 OFFICE O/P EST MOD 30 MIN: CPT | Performed by: INTERNAL MEDICINE

## 2019-06-27 RX ORDER — ATORVASTATIN CALCIUM 20 MG/1
20 TABLET, FILM COATED ORAL DAILY
Qty: 30 TABLET | Refills: 11 | Status: SHIPPED | OUTPATIENT
Start: 2019-06-27

## 2019-06-27 ASSESSMENT — MIFFLIN-ST. JEOR: SCORE: 1209.78

## 2019-06-27 NOTE — LETTER
"6/27/2019    Joseph Pelayo MD  [address]  [facility] [address]    RE: Marcia Gary       Dear Colleague,    I had the pleasure of seeing Marcia Gary in the [facility].    HISTORY OF PRESENT ILLNESS:  No symptoms. Asymptomatic coronary calcification and cardiomegaly noted on CT.No recent TTE.      Orders this Visit:  No orders of the defined types were placed in this encounter.    No orders of the defined types were placed in this encounter.    There are no discontinued medications.    No diagnosis found.    CURRENT MEDICATIONS:  Current Outpatient Medications   Medication Sig Dispense Refill     calcium citrate-vitamin D (CITRACAL) 315-200 MG-UNIT TABS Take 1 tablet by mouth daily       Cholecalciferol (VITAMIN D3 PO) Take 1,000 Units by mouth daily       diltiazem (CARTIA XT) 240 MG 24 hr capsule Take 1 capsule (240 mg) by mouth daily 90 capsule 3     ferrous sulfate (IRON) 325 (65 FE) MG tablet Take 325 mg by mouth Once a week       Multiple Vitamins-Minerals (MULTI COMPLETE PO) approx once a week       OMEPRAZOLE PO Take 20 mg by mouth 2 times daily (before meals)       rivaroxaban ANTICOAGULANT (XARELTO) 15 MG TABS tablet Take 1 tablet (15 mg) by mouth daily (with dinner) 90 tablet 11       ALLERGIES     Allergies   Allergen Reactions     Amoxicillin      Augmentin      Cephalexin      Doxycycline Nausea     Headaches      Smz-Tmp Ds [Sulfamethoxazole W/Trimethoprim] Rash     Itching. (800/160 mg twice day)       PAST MEDICAL, SURGICAL, FAMILY, SOCIAL HISTORY:  History was reviewed and updated as needed, see medical record.    Review of Systems:  A 12-point review of systems was completed, see medical record for detailed review of systems information.    Physical Exam:  Vitals: /60 (BP Location: Right arm, Patient Position: Sitting, Cuff Size: Adult Regular)   Pulse 81   Ht 1.6 m (5' 3\")   Wt 77.6 kg (171 lb)   SpO2 99%   BMI 30.29 kg/m   "     Constitutional:           Skin:           Head:           Eyes:           ENT:           Neck:           Chest:           Cardiac:                    Abdomen:           Vascular:                                        Extremities and Back:           Neurological:           ASSESSMENT: Statin therapy would be reasonable.       RECOMMENDATIONS:   Atorvastatin 20 mg daily.       Recent Lab Results:  LIPID RESULTS:  No results found for: CHOL, HDL, LDL, TRIG, CHOLHDLRATIO    LIVER ENZYME RESULTS:  No results found for: AST, ALT    CBC RESULTS:  Lab Results   Component Value Date    WBC 10.1 04/10/2015    RBC 4.14 04/10/2015    HGB 13.2 04/10/2015    HCT 38.5 04/10/2015    MCV 92.9 04/10/2015    MCH 31.8 (A) 04/10/2015    MCHC 34.2 04/10/2015    RDW 14.1 04/10/2015     04/10/2015     03/26/2010       BMP RESULTS:  Lab Results   Component Value Date     02/19/2013    POTASSIUM 4.8 02/19/2013    CHLORIDE 104 02/19/2013    CO2 29 02/19/2013    ANIONGAP 6 03/26/2010    GLC 97 02/19/2013    BUN 36 (H) 02/19/2013    CR 1.3 02/19/2013    GFRESTIMATED 46 (L) 03/26/2010    GFRESTBLACK 56 (L) 03/26/2010    ARABELLA 9.9 02/19/2013        A1C RESULTS:  No results found for: A1C    INR RESULTS:  Lab Results   Component Value Date    INR 1.44 (H) 07/22/2008    INR 1.53 (H) 07/02/2008       We greatly appreciate the opportunity to be involved in the care of your patient, Marcia Gary.    Sincerely,  Joseph Tolliver MD      CC  Joseph Pelayo MD  [address]  [place] [address]                                                                       Thank you for allowing me to participate in the care of your patient.      Sincerely,     Joseph Tolliver MD     [facility]    cc:   Joseph Pelayo MD  [address]  [place] [address]

## 2019-06-27 NOTE — LETTER
"6/27/2019      Joseph Pelayo MD  [address] [facility] [address]  [facility] [address]      RE: Marcia Gary       Dear Colleague,    I had the pleasure of seeing Marcia Gary in the [facility].    Service Date: 06/27/2019      HISTORY OF PRESENT ILLNESS:  Marcia Gary, an 83-year-old woman with chronic atrial fibrillation, hypertension, dyslipidemia, scleroderma, rheumatoid arthritis and chronic kidney disease was seen today at your request for followup of coronary calcification and cardiomegaly noted on a recent CT scan.      Ms. Gary recently underwent a CT scan at [facility] for followup of a chronic cough.  The CT scan demonstrated incidental findings of coronary calcification and cardiomegaly.  You requested cardiology followup.      The patient remains free of symptoms.  She specifically denies chest, arm, neck, jaw or back discomfort with exertion,exertional dyspnea, orthopnea, PND or pedal edema. There have been no new focal neurologic deficits in strength or sensation.  She is compliant with all prescribed medical therapy      In the past, the patient tells me her lipid profiles have been normal, though we have no recent records have a lipid profile in our system.  She is followed by nephrologist for chronic kidney disease and a recent ultrasound showed a nonfunctioning left kidney with a normal right kidney.      PAST MEDICAL HISTORY:   1.  Chronic atrial fibrillation, on rivaroxaban and diltiazem for rate control.   2.  Hypertension, well controlled.   3.  Scleroderma.   4.  Rheumatoid arthritis.   5.  Chronic kidney disease.   a.  History of left hydronephrosis.   6. Coronary atherosclerosis \" calcification on CT scan\"     PHYSICAL EXAMINATION:   GENERAL:  Exam today demonstrates a very pleasant, cooperative and intelligent 83-year-old woman.   VITAL SIGNS:  Her blood pressure is 118/60, heart rate 81 and irregular.  Her height is 1.6 meters, her weight is 76.6 " kg.  Her BMI is 30.3.   LUNGS:  Clear to percussion and auscultation.   CARDIOVASCULAR:  Shows an irregular S1 and S2.  There is no S3.  There is no murmur, rub or click.      LABORATORY STUDIES:  I have reviewed the CT scan report.      ASSESSMENT: .  Coronary calcification is a very frequent finding in patients above age 75.  There are no randomized controlled studies to guide therapy, but the presence of coronary calcification indicates atherosclerosis, and I would recommend empiric treatment with a statin drug.  I am uncertain as to the cause of the patient's  cardiomegaly on CT scan, but it may be due to left ventricular hypertrophy as her last TTE in  demonstrated normal LV/RV chamber sizes and systolic performance. A repeat echo would be reasonable to confirm  left ventricular systolic performance remains normal, as if LVEF has declined, it might change our therapy.      RECOMMENDATIONS:   1.  Echocardiogram.   2.  Atorvastatin 20 mg daily.   3.  Continue Mediterranean-style diet and regular exercise.   4.  Follow up lipid profile with primary care doctor.      We will contact both your office and the patient with the results of her echocardiogram and provide  recommendations and/or followup as indicated.  If we find nothing new on the echo, she can follow up with you long-term.      We appreciate the opportunity to participate in the care of your patient, Margarita Gómez.      cc:      Joseph Pelayo MD    [facility]   [address] [facility] [address]   [place] [address]         JOSEPH MALLOY MD             D: 2019   T: 2019   MT: [facility]      Name:     MARGARITA GÓMEZ   MRN:      [id number]        Account:      [id number]   :      1935           Service Date: 2019      Document: [id number]           Outpatient Encounter Medications as of 2019   Medication Sig Dispense Refill     atorvastatin (LIPITOR) 20 MG tablet Take 1 tablet (20 mg) by mouth daily 30 tablet 11      calcium citrate-vitamin D (CITRACAL) 315-200 MG-UNIT TABS Take 1 tablet by mouth daily       Cholecalciferol (VITAMIN D3 PO) Take 1,000 Units by mouth daily       diltiazem (CARTIA XT) 240 MG 24 hr capsule Take 1 capsule (240 mg) by mouth daily 90 capsule 3     ferrous sulfate (IRON) 325 (65 FE) MG tablet Take 325 mg by mouth Once a week       Multiple Vitamins-Minerals (MULTI COMPLETE PO) approx once a week       OMEPRAZOLE PO Take 20 mg by mouth 2 times daily (before meals)       rivaroxaban ANTICOAGULANT (XARELTO) 15 MG TABS tablet Take 1 tablet (15 mg) by mouth daily (with dinner) 90 tablet 11     No facility-administered encounter medications on file as of 6/27/2019.        Again, thank you for allowing me to participate in the care of your patient.      Sincerely,    Joseph Tolliver MD     [facility]

## 2019-06-27 NOTE — PROGRESS NOTES
"HISTORY OF PRESENT ILLNESS:  No symptoms. Asymptomatic coronary calcification and cardiomegaly noted on CT.No recent TTE.      Orders this Visit:  No orders of the defined types were placed in this encounter.    No orders of the defined types were placed in this encounter.    There are no discontinued medications.    No diagnosis found.    CURRENT MEDICATIONS:  Current Outpatient Medications   Medication Sig Dispense Refill     calcium citrate-vitamin D (CITRACAL) 315-200 MG-UNIT TABS Take 1 tablet by mouth daily       Cholecalciferol (VITAMIN D3 PO) Take 1,000 Units by mouth daily       diltiazem (CARTIA XT) 240 MG 24 hr capsule Take 1 capsule (240 mg) by mouth daily 90 capsule 3     ferrous sulfate (IRON) 325 (65 FE) MG tablet Take 325 mg by mouth Once a week       Multiple Vitamins-Minerals (MULTI COMPLETE PO) approx once a week       OMEPRAZOLE PO Take 20 mg by mouth 2 times daily (before meals)       rivaroxaban ANTICOAGULANT (XARELTO) 15 MG TABS tablet Take 1 tablet (15 mg) by mouth daily (with dinner) 90 tablet 11       ALLERGIES     Allergies   Allergen Reactions     Amoxicillin      Augmentin      Cephalexin      Doxycycline Nausea     Headaches      Smz-Tmp Ds [Sulfamethoxazole W/Trimethoprim] Rash     Itching. (800/160 mg twice day)       PAST MEDICAL, SURGICAL, FAMILY, SOCIAL HISTORY:  History was reviewed and updated as needed, see medical record.    Review of Systems:  A 12-point review of systems was completed, see medical record for detailed review of systems information.    Physical Exam:  Vitals: /60 (BP Location: Right arm, Patient Position: Sitting, Cuff Size: Adult Regular)   Pulse 81   Ht 1.6 m (5' 3\")   Wt 77.6 kg (171 lb)   SpO2 99%   BMI 30.29 kg/m      Constitutional:           Skin:           Head:           Eyes:           ENT:           Neck:           Chest:           Cardiac:                    Abdomen:           Vascular:                                        Extremities " and Back:           Neurological:           ASSESSMENT: Statin therapy would be reasonable.       RECOMMENDATIONS:   Atorvastatin 20 mg daily.       Recent Lab Results:  LIPID RESULTS:  No results found for: CHOL, HDL, LDL, TRIG, CHOLHDLRATIO    LIVER ENZYME RESULTS:  No results found for: AST, ALT    CBC RESULTS:  Lab Results   Component Value Date    WBC 10.1 04/10/2015    RBC 4.14 04/10/2015    HGB 13.2 04/10/2015    HCT 38.5 04/10/2015    MCV 92.9 04/10/2015    MCH 31.8 (A) 04/10/2015    MCHC 34.2 04/10/2015    RDW 14.1 04/10/2015     04/10/2015     03/26/2010       BMP RESULTS:  Lab Results   Component Value Date     02/19/2013    POTASSIUM 4.8 02/19/2013    CHLORIDE 104 02/19/2013    CO2 29 02/19/2013    ANIONGAP 6 03/26/2010    GLC 97 02/19/2013    BUN 36 (H) 02/19/2013    CR 1.3 02/19/2013    GFRESTIMATED 46 (L) 03/26/2010    GFRESTBLACK 56 (L) 03/26/2010    ARABELLA 9.9 02/19/2013        A1C RESULTS:  No results found for: A1C    INR RESULTS:  Lab Results   Component Value Date    INR 1.44 (H) 07/22/2008    INR 1.53 (H) 07/02/2008       We greatly appreciate the opportunity to be involved in the care of your patient, Marcia Gary.    Sincerely,  Joseph Tolliver MD        Joseph Pelayo MD  [address] [facility] [address]  [place] [address]

## 2019-06-27 NOTE — PROGRESS NOTES
"Service Date: 06/27/2019      HISTORY OF PRESENT ILLNESS:  Marcia Gary, an 83-year-old woman with chronic atrial fibrillation, hypertension, dyslipidemia, scleroderma, rheumatoid arthritis and chronic kidney disease was seen today at your request for followup of coronary calcification and cardiomegaly noted on a recent CT scan.      Ms. Gary recently underwent a CT scan at [facility] for followup of a chronic cough.  The CT scan demonstrated incidental findings of coronary calcification and cardiomegaly.  You requested cardiology followup.      The patient remains free of symptoms.  She specifically denies chest, arm, neck, jaw or back discomfort with exertion,exertional dyspnea, orthopnea, PND or pedal edema. There have been no new focal neurologic deficits in strength or sensation.  She is compliant with all prescribed medical therapy      In the past, the patient tells me her lipid profiles have been normal, though we have no recent records have a lipid profile in our system.  She is followed by nephrologist for chronic kidney disease and a recent ultrasound showed a nonfunctioning left kidney with a normal right kidney.      PAST MEDICAL HISTORY:   1.  Chronic atrial fibrillation, on rivaroxaban and diltiazem for rate control.   2.  Hypertension, well controlled.   3.  Scleroderma.   4.  Rheumatoid arthritis.   5.  Chronic kidney disease.   a.  History of left hydronephrosis.   6. Coronary atherosclerosis \" calcification on CT scan\"     PHYSICAL EXAMINATION:   GENERAL:  Exam today demonstrates a very pleasant, cooperative and intelligent 83-year-old woman.   VITAL SIGNS:  Her blood pressure is 118/60, heart rate 81 and irregular.  Her height is 1.6 meters, her weight is 76.6 kg.  Her BMI is 30.3.   LUNGS:  Clear to percussion and auscultation.   CARDIOVASCULAR:  Shows an irregular S1 and S2.  There is no S3.  There is no murmur, rub or click.      LABORATORY STUDIES:  I have reviewed the CT scan " report.      ASSESSMENT: .  Coronary calcification is a very frequent finding in patients above age 75.  There are no randomized controlled studies to guide therapy, but the presence of coronary calcification indicates atherosclerosis, and I would recommend empiric treatment with a statin drug.  I am uncertain as to the cause of the patient's  cardiomegaly on CT scan, but it may be due to left ventricular hypertrophy as her last TTE in  demonstrated normal LV/RV chamber sizes and systolic performance. A repeat echo would be reasonable to confirm  left ventricular systolic performance remains normal, as if LVEF has declined, it might change our therapy.      RECOMMENDATIONS:   1.  Echocardiogram.   2.  Atorvastatin 20 mg daily.   3.  Continue Mediterranean-style diet and regular exercise.   4.  Follow up lipid profile with primary care doctor.      We will contact both your office and the patient with the results of her echocardiogram and provide  recommendations and/or followup as indicated.  If we find nothing new on the echo, she can follow up with you long-term.      We appreciate the opportunity to participate in the care of your patient, Margarita Gómez.      cc:      Joseph Pelayo MD    [facility]   [address]   [place] [address]         JOSEPH MALLOY MD             D: 2019   T: 2019   MT: [facility]      Name:     MARGARITA GÓMEZ   MRN:      [id number]        Account:      [id number]   :      1935           Service Date: 2019      Document: [id number]

## 2019-07-05 ENCOUNTER — TRANSFERRED RECORDS (OUTPATIENT)
Dept: HEALTH INFORMATION MANAGEMENT | Facility: CLINIC | Age: 84
End: 2019-07-05

## 2019-07-05 LAB
ALBUMIN SERPL-MCNC: 4.2 G/DL
ALP SERPL-CCNC: 138 U/L
ALT SERPL-CCNC: 14 U/L
ANION GAP SERPL CALCULATED.3IONS-SCNC: NORMAL MMOL/L
AST SERPL-CCNC: 27 U/L
BILIRUB SERPL-MCNC: 0.3 MG/DL
BUN SERPL-MCNC: 25 MG/DL
CALCIUM SERPL-MCNC: 9.6 MG/DL
CHLORIDE SERPLBLD-SCNC: 107 MMOL/L
CHOLEST SERPL-MCNC: 141 MG/DL
CO2 SERPL-SCNC: 27 MMOL/L
CREAT SERPL-MCNC: 1.07 MG/DL
GFR SERPL CREATININE-BSD FRML MDRD: 49 ML/MIN/1.73M2
GLUCOSE SERPL-MCNC: 95 MG/DL (ref 70–99)
HDLC SERPL-MCNC: 67 MG/DL
LDLC SERPL CALC-MCNC: 66 MG/DL
NONHDLC SERPL-MCNC: NORMAL MG/DL
POTASSIUM SERPL-SCNC: 4.9 MMOL/L
PROT SERPL-MCNC: 7.2 G/DL
SODIUM SERPL-SCNC: 143 MMOL/L
TRIGL SERPL-MCNC: 38 MG/DL
VLDL - CALC: 8 CALC

## 2019-07-22 ENCOUNTER — HOSPITAL ENCOUNTER (OUTPATIENT)
Dept: CARDIOLOGY | Facility: CLINIC | Age: 84
Discharge: HOME | End: 2019-07-22
Attending: INTERNAL MEDICINE | Admitting: INTERNAL MEDICINE
Payer: MEDICARE

## 2019-07-22 DIAGNOSIS — I48.20 CHRONIC ATRIAL FIBRILLATION (H): ICD-10-CM

## 2019-07-22 DIAGNOSIS — I25.10 CORONARY ARTERY DISEASE INVOLVING NATIVE CORONARY ARTERY OF NATIVE HEART WITHOUT ANGINA PECTORIS: ICD-10-CM

## 2019-07-22 DIAGNOSIS — I10 BENIGN ESSENTIAL HYPERTENSION: ICD-10-CM

## 2019-07-22 PROCEDURE — 93306 TTE W/DOPPLER COMPLETE: CPT

## 2019-07-22 PROCEDURE — 93306 TTE W/DOPPLER COMPLETE: CPT | Mod: 26 | Performed by: INTERNAL MEDICINE

## 2019-07-31 ENCOUNTER — DOCUMENTATION ONLY (OUTPATIENT)
Dept: CARDIOLOGY | Facility: CLINIC | Age: 84
End: 2019-07-31

## 2019-07-31 NOTE — PROGRESS NOTES
"Reviewed results and recommendations of Echo done 7/22/19. Will fax copy to PMD, per pt request. See  result note below.     \"Notes recorded by Unruly, Joseph Fischer MD on 7/22/2019 at 10:43 AM CDT  It looks as if LV and RV chamber sizes are normal. At age 83 I would not be enthusiastic about further evaluation of her moderate MR in absence of symptoms. She can follow up as indicated during last clinic visit. Thanks \"      ART Sam    "

## 2019-08-14 ENCOUNTER — DOCUMENTATION ONLY (OUTPATIENT)
Dept: CARDIOLOGY | Facility: CLINIC | Age: 84
End: 2019-08-14

## 2019-08-14 NOTE — PROGRESS NOTES
Records from [facility] received. OV from 7/5/19, 7/3/19, 5/22/19 and CT chest from 5/2/19. Sent records to kenyatta Sam RN

## 2019-10-03 ENCOUNTER — TRANSFERRED RECORDS (OUTPATIENT)
Dept: HEALTH INFORMATION MANAGEMENT | Facility: CLINIC | Age: 84
End: 2019-10-03

## 2020-01-27 ENCOUNTER — HOSPITAL ENCOUNTER (OUTPATIENT)
Facility: CLINIC | Age: 85
End: 2020-01-27
Attending: INTERNAL MEDICINE | Admitting: INTERNAL MEDICINE
Payer: MEDICARE

## 2021-01-29 ENCOUNTER — IMMUNIZATION (OUTPATIENT)
Dept: NURSING | Facility: CLINIC | Age: 86
End: 2021-01-29
Payer: MEDICARE

## 2021-01-29 PROCEDURE — 91300 PR COVID VAC PFIZER DIL RECON 30 MCG/0.3 ML IM: CPT

## 2021-01-29 PROCEDURE — 0001A PR COVID VAC PFIZER DIL RECON 30 MCG/0.3 ML IM: CPT

## 2021-02-19 ENCOUNTER — IMMUNIZATION (OUTPATIENT)
Dept: NURSING | Facility: CLINIC | Age: 86
End: 2021-02-19
Attending: PHYSICIAN ASSISTANT
Payer: MEDICARE

## 2021-02-19 PROCEDURE — 91300 PR COVID VAC PFIZER DIL RECON 30 MCG/0.3 ML IM: CPT

## 2021-02-19 PROCEDURE — 0002A PR COVID VAC PFIZER DIL RECON 30 MCG/0.3 ML IM: CPT

## 2024-04-07 ENCOUNTER — OFFICE VISIT (OUTPATIENT)
Dept: URGENT CARE | Facility: URGENT CARE | Age: 89
End: 2024-04-07
Payer: MEDICARE

## 2024-04-07 ENCOUNTER — ANCILLARY PROCEDURE (OUTPATIENT)
Dept: GENERAL RADIOLOGY | Facility: CLINIC | Age: 89
End: 2024-04-07
Attending: INTERNAL MEDICINE
Payer: MEDICARE

## 2024-04-07 VITALS
TEMPERATURE: 97.6 F | BODY MASS INDEX: 24.02 KG/M2 | OXYGEN SATURATION: 100 % | DIASTOLIC BLOOD PRESSURE: 71 MMHG | WEIGHT: 135.6 LBS | HEART RATE: 57 BPM | SYSTOLIC BLOOD PRESSURE: 131 MMHG | RESPIRATION RATE: 16 BRPM

## 2024-04-07 DIAGNOSIS — R10.9 FLANK PAIN: ICD-10-CM

## 2024-04-07 DIAGNOSIS — K59.00 CONSTIPATION, UNSPECIFIED CONSTIPATION TYPE: Primary | ICD-10-CM

## 2024-04-07 LAB
ALBUMIN UR-MCNC: ABNORMAL MG/DL
APPEARANCE UR: CLEAR
BASOPHILS # BLD AUTO: 0 10E3/UL (ref 0–0.2)
BASOPHILS NFR BLD AUTO: 0 %
BILIRUB UR QL STRIP: NEGATIVE
COLOR UR AUTO: YELLOW
EOSINOPHIL # BLD AUTO: 0 10E3/UL (ref 0–0.7)
EOSINOPHIL NFR BLD AUTO: 0 %
ERYTHROCYTE [DISTWIDTH] IN BLOOD BY AUTOMATED COUNT: 15 % (ref 10–15)
GLUCOSE UR STRIP-MCNC: NEGATIVE MG/DL
HCT VFR BLD AUTO: 36.4 % (ref 35–47)
HGB BLD-MCNC: 11.4 G/DL (ref 11.7–15.7)
HGB UR QL STRIP: NEGATIVE
IMM GRANULOCYTES # BLD: 0 10E3/UL
IMM GRANULOCYTES NFR BLD: 0 %
KETONES UR STRIP-MCNC: ABNORMAL MG/DL
LEUKOCYTE ESTERASE UR QL STRIP: NEGATIVE
LYMPHOCYTES # BLD AUTO: 1.1 10E3/UL (ref 0.8–5.3)
LYMPHOCYTES NFR BLD AUTO: 15 %
MCH RBC QN AUTO: 30.8 PG (ref 26.5–33)
MCHC RBC AUTO-ENTMCNC: 31.3 G/DL (ref 31.5–36.5)
MCV RBC AUTO: 98 FL (ref 78–100)
MONOCYTES # BLD AUTO: 0.6 10E3/UL (ref 0–1.3)
MONOCYTES NFR BLD AUTO: 8 %
NEUTROPHILS # BLD AUTO: 5.7 10E3/UL (ref 1.6–8.3)
NEUTROPHILS NFR BLD AUTO: 76 %
NITRATE UR QL: NEGATIVE
PH UR STRIP: 5.5 [PH] (ref 5–7)
PLATELET # BLD AUTO: 182 10E3/UL (ref 150–450)
RBC # BLD AUTO: 3.7 10E6/UL (ref 3.8–5.2)
RBC #/AREA URNS AUTO: ABNORMAL /HPF
SP GR UR STRIP: 1.02 (ref 1–1.03)
UROBILINOGEN UR STRIP-ACNC: 0.2 E.U./DL
WBC # BLD AUTO: 7.5 10E3/UL (ref 4–11)
WBC #/AREA URNS AUTO: ABNORMAL /HPF

## 2024-04-07 PROCEDURE — 74019 RADEX ABDOMEN 2 VIEWS: CPT | Mod: TC | Performed by: RADIOLOGY

## 2024-04-07 PROCEDURE — 81001 URINALYSIS AUTO W/SCOPE: CPT

## 2024-04-07 PROCEDURE — 99203 OFFICE O/P NEW LOW 30 MIN: CPT | Performed by: INTERNAL MEDICINE

## 2024-04-07 PROCEDURE — 85025 COMPLETE CBC W/AUTO DIFF WBC: CPT | Performed by: INTERNAL MEDICINE

## 2024-04-07 PROCEDURE — 36415 COLL VENOUS BLD VENIPUNCTURE: CPT | Performed by: INTERNAL MEDICINE

## 2024-04-07 RX ORDER — BISACODYL 10 MG
10 SUPPOSITORY, RECTAL RECTAL DAILY PRN
Qty: 60 SUPPOSITORY | Refills: 0 | Status: SHIPPED | OUTPATIENT
Start: 2024-04-07 | End: 2024-09-26

## 2024-04-07 RX ORDER — DOCUSATE SODIUM 100 MG/1
100 CAPSULE, LIQUID FILLED ORAL 2 TIMES DAILY
Qty: 30 CAPSULE | Refills: 0 | Status: SHIPPED | OUTPATIENT
Start: 2024-04-07 | End: 2024-09-26

## 2024-04-07 NOTE — PROGRESS NOTES
Assessment & Plan     Constipation, unspecified constipation type  - bisacodyl (DULCOLAX) 10 MG suppository; Place 1 suppository (10 mg) rectally daily as needed for constipation  - docusate sodium (COLACE) 100 MG capsule; Take 1 capsule (100 mg) by mouth 2 times daily    Flank pain  - UA Macroscopic with reflex to Microscopic and Culture  - UA Microscopic with Reflex to Culture  - XR Abdomen 2 Views; Future  - CBC with platelets and differential; Future  - CBC with platelets and differential      Subjective   Marcia is a 88 year old, presenting for the following health issues:  Urgent Care (Left flank pain goes down to left lower pelvic area since Thursday. )    HPI   Chief complaint of acute left flank and LLQ pain.   This has been present for several days.  Quality is an aching pain that radiates around to the LLQ with some tenderness.  Denies radiation of pain outside of the back or abdomen.  Worse with standing.  Denies fevers, chills or sweats.  Has had some nausea and vomited small amounts on a couple of occasions.  Last bowel movement two days ago and small volume. Denies change in urinary frequency, urgency, dysuria, hematuria, pyuria.        Review of Systems  Constitutional, HEENT, cardiovascular, pulmonary, gi and gu systems are negative, except as otherwise noted.      Objective    /71 (BP Location: Right arm, Patient Position: Sitting, Cuff Size: Adult Small)   Pulse 57   Temp 97.6  F (36.4  C) (Tympanic)   Resp 16   Wt 61.5 kg (135 lb 9.6 oz)   SpO2 100%   BMI 24.02 kg/m    Body mass index is 24.02 kg/m .  Physical Exam   GENERAL: alert and no distress  HENT: ear canals and TM's normal, nose and mouth without ulcers or lesions  RESP: lungs clear to auscultation - no rales, rhonchi or wheezes  CV: regular rate and rhythm, normal S1 S2, no S3 or S4, no murmur, click or rub, no peripheral edema   ABDOMEN: soft, nondistended, with mild left-sided tenderness without rebound or guarding, no  CVA tenderness, negative psoas and obturator signs    Plain films of the abdomen show moderate stool present throughout the colon and a paucity of small-intestinal  gas    Results for orders placed or performed in visit on 04/07/24 (from the past 24 hour(s))   UA Macroscopic with reflex to Microscopic and Culture    Specimen: Urine, Midstream   Result Value Ref Range    Color Urine Yellow Colorless, Straw, Light Yellow, Yellow    Appearance Urine Clear Clear    Glucose Urine Negative Negative mg/dL    Bilirubin Urine Negative Negative    Ketones Urine Trace (A) Negative mg/dL    Specific Gravity Urine 1.025 1.003 - 1.035    Blood Urine Negative Negative    pH Urine 5.5 5.0 - 7.0    Protein Albumin Urine Trace (A) Negative mg/dL    Urobilinogen Urine 0.2 0.2, 1.0 E.U./dL    Nitrite Urine Negative Negative    Leukocyte Esterase Urine Negative Negative   UA Microscopic with Reflex to Culture   Result Value Ref Range    RBC Urine 0-2 0-2 /HPF /HPF    WBC Urine 5-10 (A) 0-5 /HPF /HPF    Narrative    Urine Culture not indicated   CBC with platelets and differential    Narrative    The following orders were created for panel order CBC with platelets and differential.  Procedure                               Abnormality         Status                     ---------                               -----------         ------                     CBC with platelets and d...[[id number]]  Abnormal            Final result                 Please view results for these tests on the individual orders.   CBC with platelets and differential   Result Value Ref Range    WBC Count 7.5 4.0 - 11.0 10e3/uL    RBC Count 3.70 (L) 3.80 - 5.20 10e6/uL    Hemoglobin 11.4 (L) 11.7 - 15.7 g/dL    Hematocrit 36.4 35.0 - 47.0 %    MCV 98 78 - 100 fL    MCH 30.8 26.5 - 33.0 pg    MCHC 31.3 (L) 31.5 - 36.5 g/dL    RDW 15.0 10.0 - 15.0 %    Platelet Count 182 150 - 450 10e3/uL    % Neutrophils 76 %    % Lymphocytes 15 %    % Monocytes 8 %    % Eosinophils 0  %    % Basophils 0 %    % Immature Granulocytes 0 %    Absolute Neutrophils 5.7 1.6 - 8.3 10e3/uL    Absolute Lymphocytes 1.1 0.8 - 5.3 10e3/uL    Absolute Monocytes 0.6 0.0 - 1.3 10e3/uL    Absolute Eosinophils 0.0 0.0 - 0.7 10e3/uL    Absolute Basophils 0.0 0.0 - 0.2 10e3/uL    Absolute Immature Granulocytes 0.0 <=0.4 10e3/uL           Signed Electronically by: David Hurd MD

## 2024-04-07 NOTE — PROGRESS NOTES
Patient presents with:  Urgent Care: Left flank pain goes down to left lower pelvic area since Thursday.

## 2024-04-07 NOTE — PATIENT INSTRUCTIONS
Fiber supplement options:  Psyllium (Metamucil, Konsyl, Benefiber) are all reasonable.  1 teaspoon in 8 ounces of water once or twice daily would be a starting point.  This can be adjusted along with your dietary fiber (vegetables, fruits and whole grains).

## 2024-04-10 ENCOUNTER — APPOINTMENT (OUTPATIENT)
Dept: CT IMAGING | Facility: CLINIC | Age: 89
End: 2024-04-10
Attending: EMERGENCY MEDICINE
Payer: MEDICARE

## 2024-04-10 ENCOUNTER — HOSPITAL ENCOUNTER (OUTPATIENT)
Facility: CLINIC | Age: 89
Setting detail: OBSERVATION
Discharge: HOME | End: 2024-04-12
Attending: EMERGENCY MEDICINE | Admitting: INTERNAL MEDICINE
Payer: MEDICARE

## 2024-04-10 DIAGNOSIS — N10 ACUTE PYELONEPHRITIS: ICD-10-CM

## 2024-04-10 DIAGNOSIS — K86.9 LESION OF PANCREAS: ICD-10-CM

## 2024-04-10 LAB
ALBUMIN SERPL BCG-MCNC: 3.4 G/DL (ref 3.5–5.2)
ALBUMIN UR-MCNC: 30 MG/DL
ALP SERPL-CCNC: 129 U/L (ref 40–150)
ALT SERPL W P-5'-P-CCNC: 9 U/L (ref 0–50)
ANION GAP SERPL CALCULATED.3IONS-SCNC: 11 MMOL/L (ref 7–15)
APPEARANCE UR: CLEAR
AST SERPL W P-5'-P-CCNC: 21 U/L (ref 0–45)
BASOPHILS # BLD AUTO: 0 10E3/UL (ref 0–0.2)
BASOPHILS NFR BLD AUTO: 1 %
BILIRUB SERPL-MCNC: 0.6 MG/DL
BILIRUB UR QL STRIP: NEGATIVE
BUN SERPL-MCNC: 23.3 MG/DL (ref 8–23)
CALCIUM SERPL-MCNC: 10 MG/DL (ref 8.8–10.2)
CHLORIDE SERPL-SCNC: 100 MMOL/L (ref 98–107)
COLOR UR AUTO: YELLOW
CREAT SERPL-MCNC: 1.19 MG/DL (ref 0.51–0.95)
DEPRECATED HCO3 PLAS-SCNC: 24 MMOL/L (ref 22–29)
EGFRCR SERPLBLD CKD-EPI 2021: 44 ML/MIN/1.73M2
EOSINOPHIL # BLD AUTO: 0 10E3/UL (ref 0–0.7)
EOSINOPHIL NFR BLD AUTO: 1 %
ERYTHROCYTE [DISTWIDTH] IN BLOOD BY AUTOMATED COUNT: 15.2 % (ref 10–15)
GLUCOSE SERPL-MCNC: 107 MG/DL (ref 70–99)
GLUCOSE UR STRIP-MCNC: NEGATIVE MG/DL
HCT VFR BLD AUTO: 35.5 % (ref 35–47)
HGB BLD-MCNC: 11 G/DL (ref 11.7–15.7)
HGB UR QL STRIP: NEGATIVE
HOLD SPECIMEN: NORMAL
HOLD SPECIMEN: NORMAL
IMM GRANULOCYTES # BLD: 0 10E3/UL
IMM GRANULOCYTES NFR BLD: 0 %
KETONES UR STRIP-MCNC: 10 MG/DL
LEUKOCYTE ESTERASE UR QL STRIP: NEGATIVE
LIPASE SERPL-CCNC: 36 U/L (ref 13–60)
LYMPHOCYTES # BLD AUTO: 1 10E3/UL (ref 0.8–5.3)
LYMPHOCYTES NFR BLD AUTO: 14 %
MCH RBC QN AUTO: 30.3 PG (ref 26.5–33)
MCHC RBC AUTO-ENTMCNC: 31 G/DL (ref 31.5–36.5)
MCV RBC AUTO: 98 FL (ref 78–100)
MONOCYTES # BLD AUTO: 0.6 10E3/UL (ref 0–1.3)
MONOCYTES NFR BLD AUTO: 9 %
NEUTROPHILS # BLD AUTO: 5.6 10E3/UL (ref 1.6–8.3)
NEUTROPHILS NFR BLD AUTO: 75 %
NITRATE UR QL: NEGATIVE
NRBC # BLD AUTO: 0 10E3/UL
NRBC BLD AUTO-RTO: 0 /100
PH UR STRIP: 6 [PH] (ref 5–7)
PLATELET # BLD AUTO: 211 10E3/UL (ref 150–450)
POTASSIUM SERPL-SCNC: 4.5 MMOL/L (ref 3.4–5.3)
PROT SERPL-MCNC: 7.8 G/DL (ref 6.4–8.3)
RBC # BLD AUTO: 3.63 10E6/UL (ref 3.8–5.2)
RBC URINE: 2 /HPF
SODIUM SERPL-SCNC: 135 MMOL/L (ref 135–145)
SP GR UR STRIP: 1.01 (ref 1–1.03)
SQUAMOUS EPITHELIAL: 1 /HPF
UROBILINOGEN UR STRIP-MCNC: NORMAL MG/DL
WBC # BLD AUTO: 7.4 10E3/UL (ref 4–11)
WBC URINE: 0 /HPF

## 2024-04-10 PROCEDURE — 258N000003 HC RX IP 258 OP 636: Performed by: EMERGENCY MEDICINE

## 2024-04-10 PROCEDURE — G0378 HOSPITAL OBSERVATION PER HR: HCPCS

## 2024-04-10 PROCEDURE — 96374 THER/PROPH/DIAG INJ IV PUSH: CPT

## 2024-04-10 PROCEDURE — 250N000013 HC RX MED GY IP 250 OP 250 PS 637: Performed by: INTERNAL MEDICINE

## 2024-04-10 PROCEDURE — 74177 CT ABD & PELVIS W/CONTRAST: CPT | Mod: MA

## 2024-04-10 PROCEDURE — 250N000011 HC RX IP 250 OP 636: Performed by: EMERGENCY MEDICINE

## 2024-04-10 PROCEDURE — 96375 TX/PRO/DX INJ NEW DRUG ADDON: CPT

## 2024-04-10 PROCEDURE — 80053 COMPREHEN METABOLIC PANEL: CPT | Performed by: EMERGENCY MEDICINE

## 2024-04-10 PROCEDURE — 36415 COLL VENOUS BLD VENIPUNCTURE: CPT | Performed by: EMERGENCY MEDICINE

## 2024-04-10 PROCEDURE — 85025 COMPLETE CBC W/AUTO DIFF WBC: CPT | Performed by: EMERGENCY MEDICINE

## 2024-04-10 PROCEDURE — 87086 URINE CULTURE/COLONY COUNT: CPT | Performed by: EMERGENCY MEDICINE

## 2024-04-10 PROCEDURE — 81003 URINALYSIS AUTO W/O SCOPE: CPT | Performed by: EMERGENCY MEDICINE

## 2024-04-10 PROCEDURE — 83690 ASSAY OF LIPASE: CPT | Performed by: EMERGENCY MEDICINE

## 2024-04-10 PROCEDURE — 99285 EMERGENCY DEPT VISIT HI MDM: CPT | Mod: 25

## 2024-04-10 PROCEDURE — 99223 1ST HOSP IP/OBS HIGH 75: CPT | Mod: AI | Performed by: INTERNAL MEDICINE

## 2024-04-10 PROCEDURE — 250N000009 HC RX 250: Performed by: EMERGENCY MEDICINE

## 2024-04-10 RX ORDER — IOPAMIDOL 755 MG/ML
65 INJECTION, SOLUTION INTRAVASCULAR ONCE
Status: COMPLETED | OUTPATIENT
Start: 2024-04-10 | End: 2024-04-10

## 2024-04-10 RX ORDER — ONDANSETRON 2 MG/ML
4 INJECTION INTRAMUSCULAR; INTRAVENOUS ONCE
Status: DISCONTINUED | OUTPATIENT
Start: 2024-04-10 | End: 2024-04-10

## 2024-04-10 RX ORDER — ACETAMINOPHEN 500 MG
1000 TABLET ORAL EVERY 6 HOURS PRN
Status: DISCONTINUED | OUTPATIENT
Start: 2024-04-10 | End: 2024-04-12 | Stop reason: HOSPADM

## 2024-04-10 RX ORDER — BISACODYL 10 MG
10 SUPPOSITORY, RECTAL RECTAL DAILY PRN
Status: DISCONTINUED | OUTPATIENT
Start: 2024-04-10 | End: 2024-04-12 | Stop reason: HOSPADM

## 2024-04-10 RX ORDER — ONDANSETRON 4 MG/1
4 TABLET, ORALLY DISINTEGRATING ORAL EVERY 6 HOURS PRN
Status: DISCONTINUED | OUTPATIENT
Start: 2024-04-10 | End: 2024-04-12 | Stop reason: HOSPADM

## 2024-04-10 RX ORDER — NALOXONE HYDROCHLORIDE 0.4 MG/ML
0.2 INJECTION, SOLUTION INTRAMUSCULAR; INTRAVENOUS; SUBCUTANEOUS
Status: DISCONTINUED | OUTPATIENT
Start: 2024-04-10 | End: 2024-04-12 | Stop reason: HOSPADM

## 2024-04-10 RX ORDER — POLYETHYLENE GLYCOL 3350 17 G/17G
17 POWDER, FOR SOLUTION ORAL 2 TIMES DAILY
Status: DISCONTINUED | OUTPATIENT
Start: 2024-04-10 | End: 2024-04-12 | Stop reason: HOSPADM

## 2024-04-10 RX ORDER — NALOXONE HYDROCHLORIDE 0.4 MG/ML
0.4 INJECTION, SOLUTION INTRAMUSCULAR; INTRAVENOUS; SUBCUTANEOUS
Status: DISCONTINUED | OUTPATIENT
Start: 2024-04-10 | End: 2024-04-12 | Stop reason: HOSPADM

## 2024-04-10 RX ORDER — ONDANSETRON 2 MG/ML
4 INJECTION INTRAMUSCULAR; INTRAVENOUS EVERY 6 HOURS PRN
Status: DISCONTINUED | OUTPATIENT
Start: 2024-04-10 | End: 2024-04-12 | Stop reason: HOSPADM

## 2024-04-10 RX ORDER — CEFTRIAXONE 2 G/1
2 INJECTION, POWDER, FOR SOLUTION INTRAMUSCULAR; INTRAVENOUS EVERY 24 HOURS
Status: DISCONTINUED | OUTPATIENT
Start: 2024-04-11 | End: 2024-04-12 | Stop reason: HOSPADM

## 2024-04-10 RX ORDER — SENNOSIDES 8.6 MG
8.6 TABLET ORAL 2 TIMES DAILY
Status: DISCONTINUED | OUTPATIENT
Start: 2024-04-10 | End: 2024-04-12 | Stop reason: HOSPADM

## 2024-04-10 RX ORDER — ACETAMINOPHEN 500 MG
1000 TABLET ORAL EVERY 6 HOURS PRN
Status: DISCONTINUED | OUTPATIENT
Start: 2024-04-10 | End: 2024-04-10

## 2024-04-10 RX ORDER — DILTIAZEM HYDROCHLORIDE 240 MG/1
240 CAPSULE, COATED, EXTENDED RELEASE ORAL DAILY
Status: DISCONTINUED | OUTPATIENT
Start: 2024-04-10 | End: 2024-04-12 | Stop reason: HOSPADM

## 2024-04-10 RX ORDER — HYDROMORPHONE HYDROCHLORIDE 1 MG/ML
0.3 INJECTION, SOLUTION INTRAMUSCULAR; INTRAVENOUS; SUBCUTANEOUS EVERY 30 MIN PRN
Status: DISCONTINUED | OUTPATIENT
Start: 2024-04-10 | End: 2024-04-12 | Stop reason: HOSPADM

## 2024-04-10 RX ORDER — PANTOPRAZOLE SODIUM 40 MG/1
40 TABLET, DELAYED RELEASE ORAL
Status: DISCONTINUED | OUTPATIENT
Start: 2024-04-11 | End: 2024-04-12 | Stop reason: HOSPADM

## 2024-04-10 RX ORDER — CEFTRIAXONE 2 G/1
2 INJECTION, POWDER, FOR SOLUTION INTRAMUSCULAR; INTRAVENOUS ONCE
Status: COMPLETED | OUTPATIENT
Start: 2024-04-10 | End: 2024-04-10

## 2024-04-10 RX ADMIN — ACETAMINOPHEN 1000 MG: 500 TABLET, FILM COATED ORAL at 15:20

## 2024-04-10 RX ADMIN — RIVAROXABAN 15 MG: 15 TABLET, FILM COATED ORAL at 19:25

## 2024-04-10 RX ADMIN — POLYETHYLENE GLYCOL 3350 17 G: 17 POWDER, FOR SOLUTION ORAL at 19:25

## 2024-04-10 RX ADMIN — IOPAMIDOL 65 ML: 755 INJECTION, SOLUTION INTRAVENOUS at 10:52

## 2024-04-10 RX ADMIN — CEFTRIAXONE SODIUM 2 G: 2 INJECTION, POWDER, FOR SOLUTION INTRAMUSCULAR; INTRAVENOUS at 14:22

## 2024-04-10 RX ADMIN — SODIUM CHLORIDE 60 ML: 9 INJECTION, SOLUTION INTRAVENOUS at 10:52

## 2024-04-10 RX ADMIN — ONDANSETRON 4 MG: 2 INJECTION INTRAMUSCULAR; INTRAVENOUS at 11:56

## 2024-04-10 RX ADMIN — SENNOSIDES 8.6 MG: 8.6 TABLET, FILM COATED ORAL at 19:26

## 2024-04-10 RX ADMIN — SODIUM CHLORIDE 1000 ML: 9 INJECTION, SOLUTION INTRAVENOUS at 11:53

## 2024-04-10 RX ADMIN — DILTIAZEM HYDROCHLORIDE 240 MG: 240 CAPSULE, COATED, EXTENDED RELEASE ORAL at 19:24

## 2024-04-10 ASSESSMENT — ACTIVITIES OF DAILY LIVING (ADL)
ADLS_ACUITY_SCORE: 35
ADLS_ACUITY_SCORE: 31
ADLS_ACUITY_SCORE: 31
ADLS_ACUITY_SCORE: 35
ADLS_ACUITY_SCORE: 33
ADLS_ACUITY_SCORE: 31
ADLS_ACUITY_SCORE: 35
ADLS_ACUITY_SCORE: 33
ADLS_ACUITY_SCORE: 35
ADLS_ACUITY_SCORE: 31

## 2024-04-10 ASSESSMENT — COLUMBIA-SUICIDE SEVERITY RATING SCALE - C-SSRS
2. HAVE YOU ACTUALLY HAD ANY THOUGHTS OF KILLING YOURSELF IN THE PAST MONTH?: NO
1. IN THE PAST MONTH, HAVE YOU WISHED YOU WERE DEAD OR WISHED YOU COULD GO TO SLEEP AND NOT WAKE UP?: NO
6. HAVE YOU EVER DONE ANYTHING, STARTED TO DO ANYTHING, OR PREPARED TO DO ANYTHING TO END YOUR LIFE?: NO
2. HAVE YOU ACTUALLY HAD ANY THOUGHTS OF KILLING YOURSELF IN THE PAST MONTH?: NO
6. HAVE YOU EVER DONE ANYTHING, STARTED TO DO ANYTHING, OR PREPARED TO DO ANYTHING TO END YOUR LIFE?: NO
1. IN THE PAST MONTH, HAVE YOU WISHED YOU WERE DEAD OR WISHED YOU COULD GO TO SLEEP AND NOT WAKE UP?: NO

## 2024-04-10 NOTE — PROGRESS NOTES
Observation goals  PRIOR TO DISCHARGE        Comments: -diagnostic tests and consults completed and resulted: not met  -vital signs normal or at patient baseline: met   -tolerating oral intake to maintain hydration: not met   Nurse to notify provider when observation goals have been met and patient is ready for discharge.

## 2024-04-10 NOTE — PROVIDER NOTIFICATION
MD Notification    Notified Person: MD    Notified Person Name: Manuelitofllandry     Notification Date/Time:April 10, 2024 1827      Notification Interaction:vocera    Purpose of Notification:FYCAMILA, pt voiced concern about code status, she is ok with CPR but does not want to be intubated. Has a advance directive filled out but not scanned in yet. thanks     Orders Received:    Comments:

## 2024-04-10 NOTE — ED TRIAGE NOTES
Triage Assessment (Adult)       Row Name 04/10/24 1436          Triage Assessment    Airway WDL WDL        Respiratory WDL    Respiratory WDL WDL        Skin Circulation/Temperature WDL    Skin Circulation/Temperature WDL WDL        Cardiac WDL    Cardiac WDL WDL        Peripheral/Neurovascular WDL    Peripheral Neurovascular WDL WDL        Cognitive/Neuro/Behavioral WDL    Cognitive/Neuro/Behavioral WDL WDL

## 2024-04-10 NOTE — PROGRESS NOTES
"[facility]  ED Nurse Handoff Report    ED Chief complaint: Abdominal Pain      ED Diagnosis:   Final diagnoses:   Acute pyelonephritis       Code Status: to be discussed with provider    Allergies:   Allergies   Allergen Reactions    Amoxicillin     Amoxicillin-Pot Clavulanate     Cephalexin     Doxycycline Nausea     Headaches     Smz-Tmp Ds [Sulfamethoxazole-Trimethoprim] Rash     Itching. (800/160 mg twice day)       Patient Story: Patient came in to ED with LLQ abdominal pain for the past 6 days. She has been also experiencing nausea and vomiting.  Focused Assessment:  Patient is constipated and is found to have pyelonephritis of the left kidney.    Treatments and/or interventions provided: Labs drawn, imaging done, and medications given.  Patient's response to treatments and/or interventions: Patient tolerated interventions well.    To be done/followed up on inpatient unit:  Treat infection. Help patient move her bowels. Follow plan of care.    Does this patient have any cognitive concerns?:  none observed    Activity level - Baseline/Home:  Independent  Activity Level - Current:   Stand with Assist    Patient's Preferred language: English   Needed?: No    Isolation: None  Infection: Not Applicable  Patient tested for COVID 19 prior to admission: NO  Bariatric?: No    Vital Signs:   Vitals:    04/10/24 0839 04/10/24 1428   BP: 115/55    BP Location: Left arm    Patient Position: Sitting    Cuff Size: Adult Regular    Pulse: 94    Resp: 20    Temp: 99.2  F (37.3  C)    TempSrc: Oral    SpO2: 98%    Weight: 59 kg (130 lb) 59 kg (130 lb)   Height: 1.6 m (5' 3\") 1.6 m (5' 3\")       Cardiac Rhythm:     Was the PSS-3 completed:   Yes  What interventions are required if any?  None needed             Family Comments: Daughter is at bedside and supportive in cares.  OBS brochure/video discussed/provided to patient/family: No              Name of person given brochure if not patient: n/a       "        Relationship to patient:     For the majority of the shift this patient's behavior was Green.   Behavioral interventions performed were none needed.    ED NURSE PHONE NUMBER: [phone]

## 2024-04-10 NOTE — PLAN OF CARE
PRIMARY Concern: left flank pain radiating to her left lower abdomen. Also constipation for the last 6 days.   Tests/Procedures for NEXT shift: MRCP tonight   Consults? (Pending/following, signed-off?): none  Safety Risk CONCERNS (fall risk, behaviors, etc.): none      Where is patient from? (Home, TCU, etc.): home  Isolation/Type: none  Other Important info for next shift: NPO since 1530 today, no food or drink until MR Abdomen MRCP which is planned for 7:30 pm per MRI tech  Anticipated DC date & active delays: pending  SUMMARY NOTE:  Orientation/Cognitive: A/Ox4   Observation Goals (Met/ Not Met): not met   Mobility Level/Assist Equipment: SBA  Antibiotics & Plan (IV/po, length of tx left): IV cefTRIAXone (ROCEPHIN) 2 g Q24h  Pain Management: LLQ pain, tylenol given  Tele/VS/O2: VSS on room air   ABNL Lab/BG: Cr 1.19  Diet: NPO until MRI  Bowel/Bladder: voiding spontaneously   Skin Concerns: WNL  Drains/Devices: IVSL   Patient Stated Goal for Today: none

## 2024-04-10 NOTE — ED PROVIDER NOTES
History     Chief Complaint:  Abdominal Pain       The history is provided by the patient.      Marcia Gary is a 88 year old female, anticoagulated on Xarelto, with a history of atrial fibrillation who presents due to left lower quadrant abdominal pain and nausea that began 6 days ago. The patient reports that her pain wraps around from her left lower quadrant to her back and one episode of vomiting.  She presented to Urgent Care and received an x-ray that revealed stool in her colon. She was then given a suppository which did not improve her symptoms. She continued to take laxatives at home without improvement. She presents to the ED today due to continuing symptoms. 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 Her last colonoscopy occurred in 2010 and was normal with no colonic polyps or diverticula found. The patient denies dysuria or difficulty urinating.     Independent Historian:   None - Patient Only    Review of External Notes:   I reviewed the nephrology note from 2/9/2024 and the renal ultrasound from 5/20/2019 which noted her left kidney with hydronephrosis and atrophy and a normal right kidney.     Medications:    Lipitor   Cartia   Xarelto     Past Medical History:    Atrial fibrillation   CKD   Hypertension   Mitral regurgitation   Rheumatic fever   Rheumatoid arthritis   Scleroderma   Sleep apnea     Past Surgical History:    Coronary angiography     Physical Exam   Patient Vitals for the past 24 hrs:   BP Temp Temp src Pulse Resp SpO2 Weight   04/10/24 0839 115/55 99.2  F (37.3  C) Oral 94 20 98 % 59 kg (130 lb)        Physical Exam    Nursing note and vitals reviewed.  Constitutional:  Appears well-developed and well-nourished.   HENT:   Head:    Atraumatic.   Mouth/Throat:   Oropharynx is clear and moist. No oropharyngeal exudate.   Eyes:    Pupils are equal, round, and reactive to light.   Neck:    Normal range of motion. Neck supple.      No tracheal  deviation present. No thyromegaly present.   Cardiovascular:  Normal rate, regular rhythm, no murmur   Pulmonary/Chest: Breath sounds are clear and equal without wheezes or crackles.  Abdominal:   Soft. Bowel sounds are normal. Exhibits no distension and      no mass. LLQ tenderness with guarding and no rebound.   Musculoskeletal:  Exhibits no edema.   Lymphadenopathy:  No cervical adenopathy.   Neurological:   Alert and oriented to person, place, and time.   Skin:    Skin is warm and dry. No rash noted. No pallor.     Emergency Department Course     Imaging:  CT Abdomen Pelvis w Contrast   Final Result   IMPRESSION:    1.  Findings of chronic left ureteropelvic junction obstruction with   severe left hydronephrosis and atrophic markedly thinned left renal   cortex. Stenosis at the left ureteropelvic junction without   obstructing stones or masses.   2.  Mild left perinephric fat stranding inferior to the left kidney,   suggestive of superimposed acute left-sided pyelitis/pyelonephritis.   3.  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 Consider nonemergent   follow-up MRCP.   4.  Upper normal sized extrahepatic bile ducts, may be secondary to   age-related ectasia. Attention on MRCP.   5.  Partly visualized cardiomegaly with biatrial cardiac enlargement.      DIONICIO CHÁVEZ MD            SYSTEM ID:  [id number]      Report per radiology.        Laboratory:  Labs Ordered and Resulted from Time of ED Arrival to Time of ED Departure   COMPREHENSIVE METABOLIC PANEL - Abnormal       Result Value    Sodium 135      Potassium 4.5      Carbon Dioxide (CO2) 24      Anion Gap 11      Urea Nitrogen 23.3 (*)     Creatinine 1.19 (*)     GFR Estimate 44 (*)     Calcium 10.0      Chloride 100      Glucose 107 (*)     Alkaline Phosphatase 129      AST 21      ALT 9      Protein Total 7.8      Albumin 3.4 (*)     Bilirubin Total 0.6     CBC WITH PLATELETS AND  DIFFERENTIAL - Abnormal    WBC Count 7.4      RBC Count 3.63 (*)     Hemoglobin 11.0 (*)     Hematocrit 35.5      MCV 98      MCH 30.3      MCHC 31.0 (*)     RDW 15.2 (*)     Platelet Count 211      % Neutrophils 75      % Lymphocytes 14      % Monocytes 9      % Eosinophils 1      % Basophils 1      % Immature Granulocytes 0      NRBCs per 100 WBC 0      Absolute Neutrophils 5.6      Absolute Lymphocytes 1.0      Absolute Monocytes 0.6      Absolute Eosinophils 0.0      Absolute Basophils 0.0      Absolute Immature Granulocytes 0.0      Absolute NRBCs 0.0     LIPASE - Normal    Lipase 36     ROUTINE UA WITH MICROSCOPIC REFLEX TO CULTURE   URINE CULTURE     Emergency Department Course & Assessments:    Interventions:  Medications   HYDROmorphone (PF) (DILAUDID) injection 0.3 mg (has no administration in time range)   cefTRIAXone (ROCEPHIN) 2 g vial to attach to  ml bag for ADULTS or NS 50 ml bag for PEDS (has no administration in time range)   iopamidol (ISOVUE-370) solution 65 mL (65 mLs Intravenous $Given 4/10/24 1052)   Saline (60 mLs As instructed $Given 4/10/24 1052)   sodium chloride 0.9% BOLUS 1,000 mL (1,000 mLs Intravenous $New Bag 4/10/24 1153)   ondansetron (ZOFRAN) injection 4 mg (4 mg Intravenous $Given 4/10/24 1156)      Assessments:  1144 I obtained history and examined the patient as noted above.   1152 I rechecked the patient. We discussed plan for admission and the patient is in agreement with this plan.     Independent Interpretation (X-rays, CTs, rhythm strip):  None    Consultations/Discussion of Management or Tests:  1400 I spoke with Dr. Walker of the hospitalist team regarding the patient, who accepted the patient for admission.       Social Determinants of Health affecting care:   None    Disposition:  The patient was admitted to the hospital under the care of Dr. aWlker.     Impression & Plan    CMS Diagnoses: None    Medical Decision Making:  I found this patient to have symptoms  consistent with acute pyelonephritis of her nonfunctioning obstructed atrophied kidney causing significant pain but no sign of sepsis at this time.  She was treated with IV ceftriaxone and admitted to the hospital for further evaluation and treatment.  She has chronic renal insufficiency which has not significantly changed at this time.  I discussed the incidental finding of the pancreas mass with the patient and her daughter and the follow-up recommendations to have an MRCP performed.  She was given pain control and admitted to the care of the hospitalist service for further evaluation treatment.  I initially considered the possibly of pancreatitis, ureteral calculus or diverticulitis in the differential diagnosis.    Diagnosis:    ICD-10-CM    1. Acute pyelonephritis  N10       2. 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     4/10/2024   Omayra Duran MD [place], Cheri Lee, MD  04/10/24 9008

## 2024-04-10 NOTE — PHARMACY-ADMISSION MEDICATION HISTORY
Pharmacist Admission Medication History    Admission medication history is complete. The information provided in this note is only as accurate as the sources available at the time of the update.    Information Source(s): Patient and CareEverywhere/SureScripts via in-person    Pertinent Information: None    Changes made to PTA medication list:  Added: Preservision  Deleted: None  Changed: iron weekly to daily    Allergies reviewed with patient and updates made in EHR: yes - patient does not remember exactly what happened with the Augmentin and Amoxicillin but she remembers have a really bad rash with the Bactrim.    Medication History Completed By: Geri Noyola RP 4/10/2024 3:06 PM    PTA Med List   Medication Sig Last Dose    atorvastatin (LIPITOR) 20 MG tablet Take 1 tablet (20 mg) by mouth daily 4/9/2024 at pm    bisacodyl (DULCOLAX) 10 MG suppository Place 1 suppository (10 mg) rectally daily as needed for constipation 4/7/2024    calcium citrate-vitamin D (CITRACAL) 315-200 MG-UNIT TABS Take 1 tablet by mouth daily 4/9/2024 at am    Cholecalciferol (VITAMIN D3 PO) Take 1,000 Units by mouth daily 4/9/2024 at am    diltiazem (CARTIA XT) 240 MG 24 hr capsule Take 1 capsule (240 mg) by mouth daily 4/9/2024 at pm    docusate sodium (COLACE) 100 MG capsule Take 1 capsule (100 mg) by mouth 2 times daily 4/7/2024    ferrous sulfate (IRON) 325 (65 FE) MG tablet Take 325 mg by mouth daily (with breakfast) Once a week 4/9/2024 at am    Multiple Vitamins-Minerals (MULTI COMPLETE PO) Take 1 tablet by mouth daily 4/9/2024 at am    Multiple Vitamins-Minerals (PRESERVISION AREDS 2 PO) Take 1 capsule by mouth 2 times daily 4/9/2024 at pm    Omeprazole 20 MG TBDD Take 20 mg by mouth 2 times daily (before meals) 4/9/2024 at pm    rivaroxaban ANTICOAGULANT (XARELTO) 15 MG TABS tablet Take 1 tablet (15 mg) by mouth daily (with dinner) 4/9/2024 at pm

## 2024-04-10 NOTE — H&P
[facility]    History and Physical - Hospitalist Service       Date of Admission:  4/10/2024    Assessment & Plan      Marcia Gary is a 88 year old female with history of chronic left-sided hydronephrosis secondary to chronic left ureteropelvic junction obstruction, history of atrial fibrillation, hypertension, rheumatoid arthritis and scleroderma who presents with 6 days of left flank pain radiating to her left lower abdomen.  Also constipation for the last 6 days.    Left lower quadrant abdominal pain  Possible left-sided pyelonephritis  Chronic left ureteropelvic junction obstruction with severe left hydronephrosis and nonfunctioning kidney  Constipation  -Patient does have chronic ureteropelvic junction obstruction with hydronephrosis.  Her UA is clean however not sure if this is a true picture even if she has infection as she has chronic hydronephrosis with obstruction with likely nonfunctioning left kidney.  She does have left CVA tenderness and CT abdomen pelvis suggest possible pyelonephritis  -LFTs unremarkable and lipase normal, no leukocytosis  -Urine culture has been sent  -Patient started on ceftriaxone, see does have allergy to cephalexin, patient could not tell me what kind of allergy she has but she feels it was possibly upset stomach.  So far has tolerated ceftriaxone so will continue ceftriaxone for now  -CT also reveals severe constipation, bowel regimen initiated with scheduled MiraLAX twice daily, Senokot twice daily and as needed suppository  -Symptomatic control of nausea vomiting with antiemetics  -Oral Tylenol for pain control, avoid narcotics for possibility of worsening constipation.    Pancreatic lesion  -Incidentally noted on the CT to have 1 cm hypodense lesion in the pancreatic body with some dilatation of extrahepatic bile ducts.  -MRCP ordered    CKD stage III  -Baseline creatinine 1-1.3.  -Creatinine at presentation at baseline 1.19  -Monitor, avoid  nephrotoxins    Anemia, chronic  -Patient denies any active bleed, hemoglobin around her baseline at presentation  -Outpatient follow-up    Atrial fibrillation on chronic anticoagulation with DOAC  Hypertension  Rheumatoid arthritis  Scleroderma  -Resume PTA medication once verified        Diet:  Regular  DVT Prophylaxis: Resume PTA DOAC  Barragan Catheter: Not present  Lines: None     Cardiac Monitoring: None  Code Status:  Full code, Code status discussed with patient and her daughter at bedside   Addend :I was called by nursing that patient doesn't wanted to be intubated . Revisited the patient for clarificatin with her son at bedside . PAtient is OK with Defibrillation and CPR but doesn't want intubation, even if it meant to be temporary     Clinically Significant Risk Factors Present on Admission              # Hypoalbuminemia: Lowest albumin = 3.4 g/dL at 4/10/2024  8:48 AM, will monitor as appropriate  # Drug Induced Coagulation Defect: home medication list includes an anticoagulant medication    # Hypertension: Noted on problem list                 Disposition Plan     Medically Ready for Discharge: Anticipated Tomorrow           Catarina Walker MD  Hospitalist Service  [facility]  Securely message with [employer] (more info)  Text page via [employer] Paging/Directory     ______________________________________________________________________    Chief Complaint   Abdominal pain, nausea vomiting, constipation    History is obtained from the patient, electronic health record, emergency department physician, and patient's daughter    History of Present Illness   Marcia Gary is a 88 year old female with history of chronic left-sided hydronephrosis secondary to chronic left ureteropelvic junction obstruction, history of atrial fibrillation, hypertension, rheumatoid arthritis and scleroderma who presents with 6 days of left flank pain radiating to her left lower abdomen.  Also constipation for the  last 6 days.    The patient reports that for about 6 days she has not had a bowel movement.  She started noticing left-sided abdominal pain radiating to her flank and also to her lower abdomen.  She has associated nausea and had emesis 6 days back.  Since then she has been nauseated but has been tolerating oral intake.  She presented to urgent care where she had stool in her colon.  With suppository she did not improve much  -Patient denies any fever, chest pain, shortness of breath, headache, dizziness, lightheadedness.      Past Medical History    Past Medical History:   Diagnosis Date    Atrial fibrillation (H)     Chronic kidney disease     Hypertension     Mitral regurgitation 2010 2010 Echo - mild-mod MR    Rheumatic fever     Rheumatoid arthritis (H)     Scleroderma (H)     Sleep apnea        Past Surgical History   Past Surgical History:   Procedure Laterality Date    CORONARY ANGIOGRAPHY ADULT ORDER  7/2008    normal coronary arteries, mod MR       Prior to Admission Medications   Prior to Admission Medications   Prescriptions Last Dose Informant Patient Reported? Taking?   Cholecalciferol (VITAMIN D3 PO)   Yes No   Sig: Take 1,000 Units by mouth daily   Multiple Vitamins-Minerals (MULTI COMPLETE PO)   Yes No   Sig: approx once a week   OMEPRAZOLE PO   Yes No   Sig: Take 20 mg by mouth 2 times daily (before meals)   atorvastatin (LIPITOR) 20 MG tablet   No No   Sig: Take 1 tablet (20 mg) by mouth daily   bisacodyl (DULCOLAX) 10 MG suppository   No No   Sig: Place 1 suppository (10 mg) rectally daily as needed for constipation   calcium citrate-vitamin D (CITRACAL) 315-200 MG-UNIT TABS   Yes No   Sig: Take 1 tablet by mouth daily   diltiazem (CARTIA XT) 240 MG 24 hr capsule   No No   Sig: Take 1 capsule (240 mg) by mouth daily   docusate sodium (COLACE) 100 MG capsule   No No   Sig: Take 1 capsule (100 mg) by mouth 2 times daily   ferrous sulfate (IRON) 325 (65 FE) MG tablet   Yes No   Sig: Take 325 mg by  mouth Once a week   rivaroxaban ANTICOAGULANT (XARELTO) 15 MG TABS tablet   No No   Sig: Take 1 tablet (15 mg) by mouth daily (with dinner)      Facility-Administered Medications: None        Review of Systems    The 10 point Review of Systems is negative other than noted in the HPI or here.      Physical Exam   Vital Signs: Temp: 99.2  F (37.3  C) Temp src: Oral BP: 115/55 Pulse: 94   Resp: 20 SpO2: 98 % O2 Device: None (Room air)    Weight: 130 lbs 0 oz    Exam:  Constitutional: Awake, alert and no distress. Appears comfortable  Head: Normocephalic. No masses, lesions, tenderness or abnormalities  ENMT: ENT exam normal, no neck nodes or sinus tenderness  Cardiovascular: RRR.  No murmurs, no rubs or JVD  Respiratory: Normal WOB,b/l equal air entry, no wheezes or crackles   Gastrointestinal: Abdomen soft, CVA tenderness present, no peritoneal signs.  BS normal. No masses, organomegaly  : Deferred  Extremities : No edema , no clubbing or cyanosis      Medical Decision Making       76 MINUTES SPENT BY ME on the date of service doing chart review, history, exam, documentation & further activities per the note.      Data     I have personally reviewed the following data over the past 24 hrs:    7.4  \   11.0 (L)   / 211     135 100 23.3 (H) /  107 (H)   4.5 24 1.19 (H) \     ALT: 9 AST: 21 AP: 129 TBILI: 0.6   ALB: 3.4 (L) TOT PROTEIN: 7.8 LIPASE: 36       Imaging results reviewed over the past 24 hrs:   Recent Results (from the past 24 hour(s))   CT Abdomen Pelvis w Contrast    Narrative    CT ABDOMEN PELVIS W CONTRAST 4/10/2024 11:32 AM    CLINICAL HISTORY: LLQ pain, nausea    TECHNIQUE: CT scan of the abdomen and pelvis was performed following  injection of IV contrast. Multiplanar reformats were obtained. Dose  reduction techniques were used.  CONTRAST: 65mL Isouve-370    COMPARISON: 4/7/2024 abdominal radiograph.    FINDINGS:   LOWER CHEST: Partly visualized marked biatrial enlargement and  cardiomegaly. The  visualized lung bases are clear.    HEPATOBILIARY: No focal lesions of the liver. No calcified gallstones.  Mild extrahepatic biliary ductal dilatation with smooth tapering  towards the ampulla. The common hepatic duct measures 10 mm, upper  normal for age..    PANCREAS: Mild diffuse age-related pancreatic parenchymal atrophy and  prominence of the main pancreatic duct. 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    SPLEEN: A few punctate calcified splenic granuloma.    ADRENAL GLANDS: Normal.    KIDNEYS/BLADDER: Severe left hydronephrosis with marked thinning of  the left renal cortex. No dilatation of the left ureter with narrowing  at the left ureteropelvic junction. No left renal calculi and no  masses at the ureterovesicular junction. Mild left perinephric fat  stranding. No right renal calculi, hydronephrosis, perinephric  stranding or suspicious masses with a few subcentimeter right renal  cysts. Partly distended urinary bladder without calculi or focal  masses.    BOWEL: Severe diverticulosis in the sigmoid colon without CT evidence  for acute diverticulitis. Moderate to large amount of formed stool  throughout the colon. No small bowel or colonic obstruction or  inflammatory changes. The appendix is not visualized but there are no  focal inflammatory changes in the right lower quadrant.    PELVIC ORGANS: No pelvic or adnexal masses.     ADDITIONAL FINDINGS: No free fluid or fluid collections. No  lymphadenopathy. No abdominal aneurysm.    MUSCULOSKELETAL: No suspicious lesions of the bones.      Impression    IMPRESSION:   1.  Findings of chronic left ureteropelvic junction obstruction with  severe left hydronephrosis and atrophic markedly thinned left renal  cortex. Stenosis at the left ureteropelvic junction without  obstructing stones or masses.  2.  Mild left perinephric fat stranding inferior to the left  kidney,  suggestive of superimposed acute left-sided pyelitis/pyelonephritis.  3.  Indeterminate 1 cm hypodense lesion in the pancreatic body, likely  a side branch intraductal papillary neoplasm. Consider nonemergent  follow-up MRCP.  4.  Upper normal sized extrahepatic bile ducts, may be secondary to  age-related ectasia. Attention on MRCP.  5.  Partly visualized cardiomegaly with biatrial cardiac enlargement.    DIONICIO CHÁVEZ MD         SYSTEM ID:  [id number]     Recent Labs   Lab 04/10/24  0848 04/07/24  1438   WBC 7.4 7.5   HGB 11.0* 11.4*   MCV 98 98    182     --    POTASSIUM 4.5  --    CHLORIDE 100  --    CO2 24  --    BUN 23.3*  --    CR 1.19*  --    ANIONGAP 11  --    ARABELLA 10.0  --    *  --    ALBUMIN 3.4*  --    PROTTOTAL 7.8  --    BILITOTAL 0.6  --    ALKPHOS 129  --    ALT 9  --    AST 21  --    LIPASE 36  --

## 2024-04-10 NOTE — ED TRIAGE NOTES
LLQ abd pain starting six days ago, was seen and UC, they did xray's and had lots of stool, no CT scan. Took dulcolax and suppository, and has had little output. Nausea, vomited five days ago. Feels nauseated today.

## 2024-04-11 ENCOUNTER — APPOINTMENT (OUTPATIENT)
Dept: MRI IMAGING | Facility: CLINIC | Age: 89
End: 2024-04-11
Attending: INTERNAL MEDICINE
Payer: MEDICARE

## 2024-04-11 LAB
ANION GAP SERPL CALCULATED.3IONS-SCNC: 9 MMOL/L (ref 7–15)
BACTERIA UR CULT: NORMAL
BUN SERPL-MCNC: 21 MG/DL (ref 8–23)
CALCIUM SERPL-MCNC: 9.3 MG/DL (ref 8.8–10.2)
CHLORIDE SERPL-SCNC: 102 MMOL/L (ref 98–107)
CREAT SERPL-MCNC: 1 MG/DL (ref 0.51–0.95)
DEPRECATED HCO3 PLAS-SCNC: 24 MMOL/L (ref 22–29)
EGFRCR SERPLBLD CKD-EPI 2021: 55 ML/MIN/1.73M2
ERYTHROCYTE [DISTWIDTH] IN BLOOD BY AUTOMATED COUNT: 15.4 % (ref 10–15)
GLUCOSE SERPL-MCNC: 95 MG/DL (ref 70–99)
HCT VFR BLD AUTO: 32.3 % (ref 35–47)
HGB BLD-MCNC: 10.3 G/DL (ref 11.7–15.7)
MCH RBC QN AUTO: 30.5 PG (ref 26.5–33)
MCHC RBC AUTO-ENTMCNC: 31.9 G/DL (ref 31.5–36.5)
MCV RBC AUTO: 96 FL (ref 78–100)
PLATELET # BLD AUTO: 195 10E3/UL (ref 150–450)
POTASSIUM SERPL-SCNC: 4.8 MMOL/L (ref 3.4–5.3)
RBC # BLD AUTO: 3.38 10E6/UL (ref 3.8–5.2)
SODIUM SERPL-SCNC: 135 MMOL/L (ref 135–145)
WBC # BLD AUTO: 9.1 10E3/UL (ref 4–11)

## 2024-04-11 PROCEDURE — 85027 COMPLETE CBC AUTOMATED: CPT | Performed by: INTERNAL MEDICINE

## 2024-04-11 PROCEDURE — 250N000013 HC RX MED GY IP 250 OP 250 PS 637: Performed by: INTERNAL MEDICINE

## 2024-04-11 PROCEDURE — G0378 HOSPITAL OBSERVATION PER HR: HCPCS

## 2024-04-11 PROCEDURE — 99233 SBSQ HOSP IP/OBS HIGH 50: CPT | Performed by: NURSE PRACTITIONER

## 2024-04-11 PROCEDURE — 74183 MRI ABD W/O CNTR FLWD CNTR: CPT | Mod: MG

## 2024-04-11 PROCEDURE — 36415 COLL VENOUS BLD VENIPUNCTURE: CPT | Performed by: INTERNAL MEDICINE

## 2024-04-11 PROCEDURE — 80048 BASIC METABOLIC PNL TOTAL CA: CPT | Performed by: INTERNAL MEDICINE

## 2024-04-11 PROCEDURE — 255N000002 HC RX 255 OP 636: Performed by: INTERNAL MEDICINE

## 2024-04-11 PROCEDURE — 96376 TX/PRO/DX INJ SAME DRUG ADON: CPT | Mod: 59

## 2024-04-11 PROCEDURE — 250N000013 HC RX MED GY IP 250 OP 250 PS 637: Performed by: NURSE PRACTITIONER

## 2024-04-11 PROCEDURE — A9585 GADOBUTROL INJECTION: HCPCS | Performed by: INTERNAL MEDICINE

## 2024-04-11 PROCEDURE — 250N000011 HC RX IP 250 OP 636: Performed by: INTERNAL MEDICINE

## 2024-04-11 RX ORDER — DOCUSATE SODIUM 100 MG/1
100 CAPSULE, LIQUID FILLED ORAL 2 TIMES DAILY PRN
Status: DISCONTINUED | OUTPATIENT
Start: 2024-04-11 | End: 2024-04-12 | Stop reason: HOSPADM

## 2024-04-11 RX ORDER — SENNOSIDES 8.6 MG
1-2 TABLET ORAL 2 TIMES DAILY PRN
Status: DISCONTINUED | OUTPATIENT
Start: 2024-04-11 | End: 2024-04-12 | Stop reason: HOSPADM

## 2024-04-11 RX ORDER — GADOBUTROL 604.72 MG/ML
6 INJECTION INTRAVENOUS ONCE
Status: COMPLETED | OUTPATIENT
Start: 2024-04-11 | End: 2024-04-11

## 2024-04-11 RX ORDER — POLYETHYLENE GLYCOL 3350 17 G/17G
17 POWDER, FOR SOLUTION ORAL 2 TIMES DAILY PRN
Status: DISCONTINUED | OUTPATIENT
Start: 2024-04-11 | End: 2024-04-12 | Stop reason: HOSPADM

## 2024-04-11 RX ADMIN — PANTOPRAZOLE SODIUM 40 MG: 40 TABLET, DELAYED RELEASE ORAL at 06:44

## 2024-04-11 RX ADMIN — BISACODYL 10 MG: 10 SUPPOSITORY RECTAL at 06:45

## 2024-04-11 RX ADMIN — PANTOPRAZOLE SODIUM 40 MG: 40 TABLET, DELAYED RELEASE ORAL at 16:39

## 2024-04-11 RX ADMIN — SENNOSIDES 8.6 MG: 8.6 TABLET, FILM COATED ORAL at 20:03

## 2024-04-11 RX ADMIN — POLYETHYLENE GLYCOL 3350 17 G: 17 POWDER, FOR SOLUTION ORAL at 21:25

## 2024-04-11 RX ADMIN — DILTIAZEM HYDROCHLORIDE 240 MG: 240 CAPSULE, COATED, EXTENDED RELEASE ORAL at 20:03

## 2024-04-11 RX ADMIN — SENNOSIDES 8.6 MG: 8.6 TABLET, FILM COATED ORAL at 08:49

## 2024-04-11 RX ADMIN — POLYETHYLENE GLYCOL 3350 17 G: 17 POWDER, FOR SOLUTION ORAL at 08:49

## 2024-04-11 RX ADMIN — CEFTRIAXONE SODIUM 2 G: 2 INJECTION, POWDER, FOR SOLUTION INTRAMUSCULAR; INTRAVENOUS at 14:20

## 2024-04-11 RX ADMIN — RIVAROXABAN 15 MG: 15 TABLET, FILM COATED ORAL at 16:39

## 2024-04-11 RX ADMIN — GADOBUTROL 6 ML: 604.72 INJECTION INTRAVENOUS at 02:57

## 2024-04-11 RX ADMIN — ACETAMINOPHEN 1000 MG: 500 TABLET, FILM COATED ORAL at 19:14

## 2024-04-11 RX ADMIN — GLYCERIN 1 SUPPOSITORY: 2 SUPPOSITORY RECTAL at 17:04

## 2024-04-11 RX ADMIN — ACETAMINOPHEN 1000 MG: 500 TABLET, FILM COATED ORAL at 06:45

## 2024-04-11 ASSESSMENT — ACTIVITIES OF DAILY LIVING (ADL)
ADLS_ACUITY_SCORE: 31

## 2024-04-11 NOTE — PLAN OF CARE
PRIMARY Concern: left flank pain radiating to her left lower abdomen. Also constipation for the last 6 days.   Tests/Procedures for NEXT shift:   Consults? (Pending/following, signed-off?): none  Safety Risk CONCERNS (fall risk, behaviors, etc.): none      Where is patient from? (Home, TCU, etc.): home  Isolation/Type: none  Other Important info for next shift:     Anticipated DC date & active delays: pending  SUMMARY NOTE:  Orientation/Cognitive: A/Ox4   Observation Goals (Met/ Not Met): not met   Mobility Level/Assist Equipment: SBA  Antibiotics & Plan (IV/po, length of tx left): IV cefTRIAXone (ROCEPHIN) 2 g Q24h  Pain Management:  Tele/VS/O2: VSS on room air   ABNL Lab/BG: See Chart  Diet: Regular Diet  Bowel/Bladder: voiding spontaneously, Dulcolax Suppository Given   Skin Concerns: WNL  Drains/Devices: IV/SL   Patient Stated Goal for Today: none     Observation goals  PRIOR TO DISCHARGE       Comments: -diagnostic tests and consults completed and resulted: not met  -vital signs normal or at patient baseline: met   -tolerating oral intake to maintain hydration: not met   Nurse to notify provider when observation goals have been met and patient is ready for discharge.

## 2024-04-11 NOTE — UTILIZATION REVIEW
"  [facility] Utilization Review  Admission Status; Secondary Review Determination     Admission Date: 4/10/2024  9:08 AM      Under the authority of the Utilization Management Committee, the utilization review process indicated a secondary review on the above patient.  The review outcome is based on review of the medical records, discussions with staff, and applying clinical experience noted on the date of the review.        (X) Observation Status Appropriate - This patient does not meet hospital inpatient criteria and is placed in observation status. If this patient's primary payer is Medicare and was admitted as an inpatient, Condition Code 44 should be used and patient status changed to \"observation\".   () Observation Status concurrent Review           RATIONALE FOR DETERMINATION   88-year-old female with history of chronic left-sided hydronephrosis secondary to chronic left ureteropelvic junction obstruction, atrial fibrillation, hypertension, rheumatoid arthritis, scleroderma admitted with 6 days of left flank pain radiating to left lower abdomen with constipation.  Patient has left CVA tenderness and CT abdomen shows possible pyelonephritis, started on IV ceftriaxone, also bowel regimen, pain control with Tylenol and antiemetic protocol.  Urine cultures growing less than 10K urogenital godwin MRCP delete that.  MRCP shows mild intra and extrahepatic bile duct dilation with CBD 10 mm, no obstructing mass lesion or choledocholithiasis, multiple cystic lesions of the pancreas, needs outpatient follow-up.  Patient ready for discharge tomorrow, does not meet criteria for inpatient stay, recommend continue observation status      The severity of illness, intensity of service provided, expected LOS make the care appropriate for observation status at this time.        The information on this document is developed by the utilization review team in order for the business office to ensure compliance.  This " only denotes the appropriateness of proper admission status and does not reflect the quality of care rendered.         The definitions of Inpatient Status and Observation Status used in making the determination above are those provided in the CMS Coverage Manual, Chapter 1 and Chapter 6, section 70.4.      Sincerely,       [place]ana Hui MD  Physician Advisor  Utilization Review-[place]    Phone: [phone]

## 2024-04-11 NOTE — PROGRESS NOTES
[facility]    Medicine Progress Note - Hospitalist Service    Date of Admission:  4/10/2024    Assessment & Plan   Marcia Gary is a 88 year old female with history of chronic left-sided hydronephrosis secondary to chronic left ureteropelvic junction obstruction, history of atrial fibrillation, hypertension, rheumatoid arthritis and scleroderma who presents with 6 days of left flank pain radiating to her left lower abdomen.  Also constipation for the last 6 days.     Left lower quadrant abdominal pain  Possible left-sided pyelonephritis  Chronic left ureteropelvic junction obstruction with severe left hydronephrosis and nonfunctioning kidney  Constipation  -Patient does have chronic ureteropelvic junction obstruction with hydronephrosis.  Her UA is clean however not sure if this is a true picture even if she has infection as she has chronic hydronephrosis with obstruction with likely nonfunctioning left kidney.  She does have left CVA tenderness and CT abdomen pelvis suggest possible pyelonephritis  -LFTs unremarkable and lipase normal, no leukocytosis  -Urine culture has been sent  -Continue ceftriaxone   -CT also reveals severe constipation, bowel regimen initiated. Received scheduled MiraLAX, Senokot and suppository AM of 4/11/2024  -Symptomatic control of nausea vomiting with antiemetics  -Oral Tylenol for pain control, avoid narcotics for possibility of worsening constipation.     Pancreatic lesion  -Incidentally noted on the CT to have 1 cm hypodense lesion in the pancreatic body with some dilatation of extrahepatic bile ducts.  -MRCP completed overnight on 4/11/2024. 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 No significant main duct dilatation or other suspicious feature.   -Recommend follow up with PCP and possible GI referral.      CKD stage III  -Baseline creatinine 1-1.3.  -Creatinine at  presentation at baseline 1.19  -Monitor, avoid nephrotoxins     Anemia, chronic  -Patient denies any active bleed, hemoglobin around her baseline at presentation  -Outpatient follow-up     Atrial fibrillation on chronic anticoagulation with DOAC  Hypertension  Rheumatoid arthritis  Scleroderma  -Resume PTA medication once verified       Observation Goals: -diagnostic tests and consults completed and resulted, -vital signs normal or at patient baseline, -tolerating oral intake to maintain hydration, Nurse to notify provider when observation goals have been met and patient is ready for discharge.  Diet: Regular Diet Adult    DVT Prophylaxis: DOAC  Barragan Catheter: Not present  Lines: None     Cardiac Monitoring: None  Code Status:  SPECIAL CODE--patient doesn't wanted to be intubated . Revisited the patient for clarificatin with her son at bedside . PAtient is OK with Defibrillation and CPR but doesn't want intubation, even if it meant to be temporary     Clinically Significant Risk Factors Present on Admission              # Hypoalbuminemia: Lowest albumin = 3.4 g/dL at 4/10/2024  8:48 AM, will monitor as appropriate  # Drug Induced Coagulation Defect: home medication list includes an anticoagulant medication    # Hypertension: Noted on problem list                 Disposition Plan     Medically Ready for Discharge: Anticipated Tomorrow           The patient's care was discussed with the Attending Physician, Dr. Osborn .    SANDY Duggan [facility]  Hospitalist Service  [facility]  Securely message with [employer] (more info)  Text page via [facility] Paging/Directory   ______________________________________________________________________    Interval History   No acute events overnight. Patient is very pleasant, resting comfortably in bed this morning with son at bedside. She had just finished a light breakfast and taken multiple medications for her constipation. She is anxious about diarrhea as a  result of the numerous medications. Denies new or worsening abdominal pain, denies passing gas at this time. Denies shortness of breath, chest pain, headaches and dizziness.     Physical Exam   Vital Signs: Temp: 98.4  F (36.9  C) Temp src: Oral BP: 110/49 Pulse: 70   Resp: 18 SpO2: 97 % O2 Device: None (Room air)    Weight: 130 lbs 0 oz    Physical Exam  Vitals and nursing note reviewed.   Constitutional:       General: She is not in acute distress.     Appearance: Normal appearance.   HENT:      Mouth/Throat:      Mouth: Mucous membranes are moist.   Eyes:      Pupils: Pupils are equal, round, and reactive to light.   Cardiovascular:      Rate and Rhythm: Normal rate and regular rhythm.      Pulses: Normal pulses.      Heart sounds: Normal heart sounds.   Pulmonary:      Effort: Pulmonary effort is normal.      Breath sounds: Normal breath sounds.   Abdominal:      General: Bowel sounds are decreased. There is no distension.      Palpations: Abdomen is soft.      Tenderness: There is no abdominal tenderness.   Musculoskeletal:         General: Normal range of motion.   Skin:     General: Skin is warm and dry.      Capillary Refill: Capillary refill takes less than 2 seconds.   Neurological:      General: No focal deficit present.      Mental Status: She is alert and oriented to person, place, and time.           Medical Decision Making       52 MINUTES SPENT BY ME on the date of service doing chart review, history, exam, documentation & further activities per the note.      Data     I have personally reviewed the following data over the past 24 hrs:    9.1  \   10.3 (L)   / 195     135 102 21.0 /  95   4.8 24 1.00 (H) \       Imaging results reviewed over the past 24 hrs:   Recent Results (from the past 24 hour(s))   MR Abdomen MRCP w/o & w Contrast    Narrative    EXAM: MR ABDOMEN MRCP W/O and W CONTRAST  LOCATION: [facility]  DATE: 4/11/2024    INDICATION: Pancreatic lesion and biliary  dilatation  COMPARISON: CT abdomen/pelvis with contrast 04/10/2024  TECHNIQUE: Routine MR liver/pancreas protocol including axial and coronal MRCP sequences. 2D and 3D reconstruction performed by MR technologist including MIP reconstruction and slab cholangiograms. If performed with contrast, additional dynamic T1 post   IV contrast images.  CONTRAST: 6 Gadavist     FINDINGS:     MRCP: Unremarkable gallbladder. Mild intra and extrahepatic bile duct dilatation. Common duct measures up to 10 mm (series 8 image 12). No evidence of obstructing mass lesion or choledocholithiasis. Finding may be due to age-related ectasia.    LIVER: Tiny hepatic cysts which require no dedicated follow-up.    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 No significant main duct dilatation or other suspicious feature.    ADDITIONAL FINDINGS: Unremarkable lung bases. Unremarkable spleen. Unremarkable adrenal glands. Small right renal cysts which require no dedicated follow-up. No right hydronephrosis. Severe dilatation of the left renal collecting system with associated   severe left renal cortical atrophy, findings suggestive of chronic left ureteropelvic junction obstruction. Trace fluid adjacent to the inferior pole on the left which may suggest mild superimposed acute pyelonephritis. Colonic diverticulosis. Moderate   colonic stool. No small bowel obstruction. Small sliding-type hiatal hernia. No lymphadenopathy. No AAA. Degenerative changes of the thoracic and lumbosacral spine. No acute osseous abnormality.      Impression    IMPRESSION:  1.  Mild intra and extrahepatic bile duct dilatation with the common duct measuring up to 10 mm. No evidence of obstructing mass lesion or choledocholithiasis. Finding may be due to age-related duct ectasia.  2.  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 No significant main duct dilatation or other suspicious feature.  3.  Findings of chronic left ureteropelvic junction obstruction. Trace fluid adjacent to the inferior pole on the left which may suggest mild superimposed acute pyelonephritis.  4.  Colonic diverticulosis. Moderate colonic stool.  5.  Small sliding-type hiatal hernia.

## 2024-04-11 NOTE — PROGRESS NOTES
PRIMARY Concern: left flank pain radiating to her left lower abdomen. Also constipation for the last 6 days.   Tests/Procedures for NEXT shift: None   Consults? (Pending/following, signed-off?): none  Safety Risk CONCERNS (fall risk, behaviors, etc.): none      Where is patient from? (Home, TCU, etc.): home  Isolation/Type: none  Other Important info for next shift:  Anticipated DC date & active delays: pending  SUMMARY NOTE: Patient still constipated despite suppositories this morning and this evening. Rectal check was negative but patient wanted glycerin suppository. Now upset and crying, wants to do something more aggressive as suppository and bowel medications have not worked. Also wants to see provider. Page sent to Dr. Bobo, waiting for response.    Orientation/Cognitive: A/Ox4   Observation Goals (Met/ Not Met): not met   Mobility Level/Assist Equipment: SBA  Antibiotics & Plan (IV/po, length of tx left): IV cefTRIAXone (ROCEPHIN) 2 g Q24h  Pain Management: LLQ pain, declines medications.   Tele/VS/O2: VSS on room air   ABNL Lab/BG: Cr 1.19  Diet: Regular diet.   Bowel/Bladder: voiding spontaneously   Skin Concerns: WNL  Drains/Devices: IVSL   Patient Stated Goal for Today: none

## 2024-04-12 VITALS
BODY MASS INDEX: 23.04 KG/M2 | RESPIRATION RATE: 18 BRPM | SYSTOLIC BLOOD PRESSURE: 107 MMHG | OXYGEN SATURATION: 98 % | HEART RATE: 85 BPM | HEIGHT: 63 IN | WEIGHT: 130 LBS | TEMPERATURE: 98 F | DIASTOLIC BLOOD PRESSURE: 51 MMHG

## 2024-04-12 PROCEDURE — G0378 HOSPITAL OBSERVATION PER HR: HCPCS

## 2024-04-12 PROCEDURE — 96376 TX/PRO/DX INJ SAME DRUG ADON: CPT

## 2024-04-12 PROCEDURE — 250N000011 HC RX IP 250 OP 636: Performed by: INTERNAL MEDICINE

## 2024-04-12 PROCEDURE — 250N000013 HC RX MED GY IP 250 OP 250 PS 637: Performed by: INTERNAL MEDICINE

## 2024-04-12 PROCEDURE — 250N000013 HC RX MED GY IP 250 OP 250 PS 637: Performed by: PHYSICIAN ASSISTANT

## 2024-04-12 PROCEDURE — 99239 HOSP IP/OBS DSCHRG MGMT >30: CPT | Performed by: PHYSICIAN ASSISTANT

## 2024-04-12 RX ORDER — CEFDINIR 300 MG/1
300 CAPSULE ORAL 2 TIMES DAILY
Qty: 8 CAPSULE | Refills: 0 | Status: SHIPPED | OUTPATIENT
Start: 2024-04-12 | End: 2024-04-16

## 2024-04-12 RX ORDER — ACETAMINOPHEN 500 MG
1000 TABLET ORAL EVERY 6 HOURS PRN
Status: SHIPPED
Start: 2024-04-12 | End: 2024-09-30

## 2024-04-12 RX ADMIN — SODIUM PHOSPHATE, DIBASIC AND SODIUM PHOSPHATE, MONOBASIC 1 ENEMA: 7; 19 ENEMA RECTAL at 13:23

## 2024-04-12 RX ADMIN — CEFTRIAXONE SODIUM 2 G: 2 INJECTION, POWDER, FOR SOLUTION INTRAMUSCULAR; INTRAVENOUS at 14:30

## 2024-04-12 RX ADMIN — POLYETHYLENE GLYCOL 3350 17 G: 17 POWDER, FOR SOLUTION ORAL at 07:12

## 2024-04-12 RX ADMIN — SENNOSIDES 8.6 MG: 8.6 TABLET, FILM COATED ORAL at 07:11

## 2024-04-12 RX ADMIN — PANTOPRAZOLE SODIUM 40 MG: 40 TABLET, DELAYED RELEASE ORAL at 07:11

## 2024-04-12 ASSESSMENT — ACTIVITIES OF DAILY LIVING (ADL)
ADLS_ACUITY_SCORE: 31

## 2024-04-12 NOTE — PROGRESS NOTES
-diagnostic tests and consults completed and resulted Not Met  -vital signs normal or at patient baseline Met   -tolerating oral intake to maintain hydration Partially met   Nurse to notify provider when observation goals have been met and patient is ready for discharge.

## 2024-04-12 NOTE — PROGRESS NOTES
"   Observation goals to meet prior to discharge:   -diagnostic tests and consults completed and resulted:  Met at this time.     -vital signs normal or at patient baseline:  Partially met. States abdominal pain has decreased.  Continuing to monitor. Has not had a successful \"full\" BM.   -tolerating oral intake to maintain hydration:  Met at this time.  Tolerating regular diet.  Denies nausea.      Summary:  Patient with less abdominal pain this morning.  She had one tap water enema before 0700 with small results per nursing handoff report.  Patient stating if she does not continue to have BM results throughout the day, she would be interested in having another tap water enema.         "

## 2024-04-12 NOTE — PROVIDER NOTIFICATION
MD Notification    Notified Person: MD    Notified Person Name: Nicolás Ramon     Notification Date/Time: 4/12/24 @ 1330    Notification Interaction: Vocera    Purpose of Notification:  Hello, patient has had constipation for 7 days now. Expresses frustration that this has not been taken care of. Could we do a water enema? Thanks! Wei Vázquez RN *[phone]    Orders Received:  Tap Water Enema x1    Comments:

## 2024-04-12 NOTE — PROGRESS NOTES
Observation goals to meet prior to discharge:   -diagnostic tests and consults completed and resulted:  Met at this time.     -vital signs normal or at patient baseline:  met  -tolerating oral intake to maintain hydration:  Met at this time.  Tolerating regular diet.  Denies nausea.       Summary:  Patient ordered a fleets enema.  Pending results of enema, patient may discharge.

## 2024-04-12 NOTE — CARE PLAN
PRIMARY Concern: Constipation; L-sided pyelonephritis  SAFETY RISK Concerns (fall risk, behaviors, etc.): None      Tests/Procedures for NEXT shift: None  Consults? (Pending/following, signed-off?) None  Where is patient from? (Home, TCU, etc.): Home  Other Important info for NEXT shift: Discharging after final dose of IV abx.   Anticipated DC date & active delays:Today  SUMMARY NOTE:  Orientation/Cognitive: A/Ox4  Observation Goals (Met/ Not Met): Met.   Mobility Level/Assist Equipment: Independent in Room  Pain Management: denies  Tele/VS/O2: VSS on RA  ABNL Lab/BG: Creatinine 1.00,   Diet: Regular  Bowel/Bladder: Continent of B/B  Skin Concerns: scattered bruising  Drains/Devices: PIV SL  Patient Stated Goal for Today: Constipation Relief   -diagnostic tests and consults completed and resulted :Met  -vital signs normal or at patient baseline :Met   -tolerating oral intake to maintain hydration: Met     Last dose of IV ABX administered to patient. AVS reviewed with patient and dtr at bedside. PO abx received from in house pharmacy and given to patient. Patient stable and w/o complaints at time of discharge. No acute concerns, all belongings sent with patient. Patient to follow up with clinic in 1 wk for hospital follow up and incidental lesion findings.

## 2024-04-12 NOTE — PROGRESS NOTES
NEPHROLOGY VISIT REPORT    DATE OF VISIT:  1/9/2017    PATIENT NAME:  ISAURO PEPE JR.     FOLLOW UP OF PROBLEM LIST:     1 chronic kidney disease stage III, hypertensive nephrosclerosis  -baseline serum creatinine 1.79-2.01; negative serology  2 chronic anemia    3 hypertension  4 hyperlipidemia  5 atherosclerotic coronary disease status post CABG in 2000  6 prediabetes  7 colon Polyps  8 bladder urinary retention-post voiding volume 254 ML  9 acute kidney injury-obstructive uropathy 11/2016  10 severe hydronephrosis bilateral-bladder outlet obstruction status post indwelling urine catheter since 11/2016  11 leg edema  12 history of urinary tract infection    PHARMACOLOGIC REGIMEN:   Current Outpatient Prescriptions   Medication Sig Dispense Refill   • metoPROLOL (TOPROL-XL) 50 MG 24 hr tablet Take 1 tablet by mouth daily. 90 tablet 0   • furosemide (LASIX) 20 MG tablet Take 1 tablet by mouth daily. 30 tablet 1   • atorvastatin (LIPITOR) 20 MG tablet 1 tablet p.o. at h.s. 90 tablet 0   • Multiple Vitamin (MULTI-VITAMIN PO) Take  by mouth.     • Cholecalciferol 1000 UNITS capsule Take  by mouth.     • Calcium Carbonate-Vitamin D (CALCIUM 600+D PO) Take by mouth daily.      • aspirin 81 MG tablet Take 81 mg by mouth daily.       No current facility-administered medications for this visit.         INTERIM LABORATORIES:  Lab Results   Component Value Date    SODIUM 135 01/02/2017    POTASSIUM 4.2 01/02/2017    CHLORIDE 99 01/02/2017    CO2 25 01/02/2017    BUN 43 (H) 01/02/2017    CREATININE 3.16 (H) 01/02/2017    GLUCOSE 102 (H) 01/02/2017     Lab Results   Component Value Date    WBC 7.4 01/02/2017    HCT 26.2 (L) 01/02/2017    HGB 8.3 (L) 01/02/2017     01/02/2017       URINALYSIS:  Urinalysis gravity 1.018, pH 6.0, protein 30, blood negative, leukoesterase negative, nitrate negative.  Microscopic study showed no RBC no WBC.     INTERIM NEPHROLOGY HISTORY:   Isauro Pepe Jr. is a 85 year old male  PRIMARY Concern: Constipation; L-sided pyelonephritis  SAFETY RISK Concerns (fall risk, behaviors, etc.): None      Tests/Procedures for NEXT shift: None  Consults? (Pending/following, signed-off?) None  Where is patient from? (Home, TCU, etc.): Home  Other Important info for NEXT shift: Performed Tap Water Enema. Minimal fecal output. Patient distressed, previously crying over constipation of over a week. Patient reports that abdominal pain is significantly improved following enema. Patient would like to try prune juice, continue taking senna, and Miralax. If she is unable to have a BM, she would like to try water enema again.   Anticipated DC date & active delays: Possibly Today  SUMMARY NOTE:  Orientation/Cognitive: AOx4  Observation Goals (Met/ Not Met): Not Met  Mobility Level/Assist Equipment: Independent in Room  Pain Management: denies; beside abdominal discomfort. Wants to refrain from narcotics because of constipation  Tele/VS/O2: VSS on RA  ABNL Lab/BG: Creatinine 1.00, GFR 55; Hgb 10.3  Diet: Regular  Bowel/Bladder: Continent of B/B  Skin Concerns: scattered bruising  Drains/Devices: PIV SL  Patient Stated Goal for Today: Constipation Relief   -diagnostic tests and consults completed and resulted Not Met  -vital signs normal or at patient baseline Met   -tolerating oral intake to maintain hydration Partially met   Nurse to notify provider when observation goals have been met and patient is ready for discharge.      presenting with acute kidney injury due to obstructive uropathy superimposed on chronic kidney disease stage III.  He was noted to have severe bilateral hydronephrosis due to bladder outlet obstruction about 7 weeks ago, and was referred to urology. Now indwelling catheter has been placed by Dr. Taylor.  His renal function was moderately improved after obstruction relief, serum creatinine was decreased to 2.8 from peak 3.47. He was treated with Bactrim due to episode of urinary tract infection which has been discontinued one week ago. His serum creatinine is worsening again at 3.16 EGFR 17. Normalized serum electrolytes. Significant worsening anemia hemoglobin 8.3.  He complains of progressive lower extremity edema. Somehow he is on decreased dose of furosemide 20 mg once daily, which is ineffective. He has improved appetite  Hypertension adequately controlled at goal currently. Urinalysis revealed clear urine, no evidence of active urinary tract infection.     PHYSICAL EXAMINATION   Vital Signs:    Visit Vitals   • /48 (BP Location: [facility] [place], Patient Position: Sitting, Cuff Size: Small Adult)   • Pulse 72   • Ht 5' 9\" (1.753 m)   • Wt 60.3 kg   • BMI 19.64 kg/m2      General:  Alert, no acute distress. Lethargic and pale  HEENT:  Pupils are round and equal, no icterus.  Neck:  Supple, no jugular venous distention.  No audible carotid artery bruits.  Chest/Lungs:  Clear to auscultation.  No wheezes, rales or rhonchi.  Cardiovascular:  There is no S3 or S4 gallop.  Has no pericardial rub heard.  Abdomen:  Soft, nontender, nondistended.  No hepatosplenomegaly.  No vascular bruits. Status post indwelling urine catheter.  Neurologic:  No focal neurological deficit.  Alert and oriented x3.  Muscle strength of extremities are within normal limits.   Skin:  Warm, dry.  No rashes.   Extremities:  2+ lower extremity edema.  Lymphatics:  No palpable neck or inguinal lymph nodes.  Psychiatric:  Mood and affect are  normal.    IMPRESSION:  1 acute kidney injury/obstructive uropathy  2 chronic kidney disease stage IV  3 leg edema  4 anemia  5 severe bilateral hydronephrosis status post indwelling urine catheter    ASSESSMENT AND PLAN:  To discontinue sodium bicarbonate. To increase furosemide 40 mg twice daily.  Check renal panel and iron indices stat, intravenous iron infusion and IMSHA therapy will be provided as needed.  Laboratory results are reviewed with patient.  Low sodium diet and avoidance of nephrotoxic medication are discussed. Bactrim is contraindicated given advanced renal failure. The risk for ESRD is very high, that patient is aware of the possibility of future renal replacement therapy.  Patient will be reevaluated in nephrology in 2 months, or sooner as needed.    FOLLOWUP VISIT:   in 2 months

## 2024-04-12 NOTE — PROGRESS NOTES
"Observation goals to meet prior to discharge:   -diagnostic tests and consults completed and resulted:  Met at this time.     -vital signs normal or at patient baseline:  Partially met. States abdominal pain has decreased.  Continuing to monitor. Has not had a successful \"full\" BM.   -tolerating oral intake to maintain hydration:  Met at this time.  Tolerating regular diet.  Denies nausea.       Summary:  Patient ordered a fleets enema.  Pending results of enema, patient may discharge.                   "

## 2024-04-12 NOTE — PLAN OF CARE
Goal Outcome Evaluation:      Plan of Care Reviewed With: patient, child    Overall Patient Progress: improving            PRIMARY Concern: Constipation; L-sided pyelonephritis  SAFETY RISK Concerns (fall risk, behaviors, etc.): None      Tests/Procedures for NEXT shift: None  Consults? (Pending/following, signed-off?) None  Where is patient from? (Home, TCU, etc.): Home  Other Important info for NEXT shift: Discharging after final dose of IV abx.   Anticipated DC date & active delays:Today  SUMMARY NOTE:  Orientation/Cognitive: A/Ox4  Observation Goals (Met/ Not Met): Met.   Mobility Level/Assist Equipment: Independent in Room  Pain Management: denies; beside abdominal discomfort. Wants to refrain from narcotics because of constipation  Tele/VS/O2: VSS on RA  ABNL Lab/BG: Creatinine 1.00,   Diet: Regular  Bowel/Bladder: Continent of B/B  Skin Concerns: scattered bruising  Drains/Devices: PIV SL  Patient Stated Goal for Today: Constipation Relief   -diagnostic tests and consults completed and resulted :Met  -vital signs normal or at patient baseline :Met   -tolerating oral intake to maintain hydration: Met                Patient plans to discharge to home following last dose of IV abx.  Daughter at bedside to assist with transferring patient to home.

## 2024-04-12 NOTE — DISCHARGE SUMMARY
[facility]  Hospitalist Discharge Summary      Date of Admission:  4/10/2024  Date of Discharge:  4/12/2024  Discharging Provider: Prudence Cassidy PA-C  Discharge Service: Hospitalist Service    Discharge Diagnoses   UTI  Constipation  Pancreatic lesions incidental findings    Clinically Significant Risk Factors          Follow-ups Needed After Discharge   Follow-up Appointments     Follow-up and recommended labs and tests       Follow up with primary care provider, Joesph Pelayo, within 7 days   for hospital follow- up and to follow up on results.  We also recommend   discussing incidental findings found on imaging during hospitalization.            Discharge Disposition   Discharged to home  Condition at discharge: Stable    Hospital Course   Marcia Gary is a 88 year-old female with history of chronic left-sided hydronephrosis secondary to chronic left ureteropelvic junction obstruction, history of atrial fibrillation on Xarelto, hypertension, rheumatoid arthritis and scleroderma who was admitted on 4/10/2024 following 6 days of left flank pain radiating to her left lower abdomen. Also constipation for the last 6 days. Please see hospital course by problem below:    Left lower quadrant abdominal pain  Possible left-sided pyelonephritis  Chronic left ureteropelvic junction obstruction with severe left hydronephrosis and nonfunctioning kidney  Constipation  *Patient does have chronic ureteropelvic junction obstruction with hydronephrosis.  Her UA is clean however not sure if this is a true picture even if she has infection as she has chronic hydronephrosis with obstruction with likely nonfunctioning left kidney.  She does have left CVA tenderness and CT abdomen pelvis suggest possible pyelonephritis  *LFTs unremarkable and lipase normal, no leukocytosis  *Urine culture pending  *Received 3 days of IV Ceftriaxone - will transition to PO regimen for total 7 day course.  *CT also  reveals severe constipation, bowel regimen initiated. Received scheduled MiraLAX, Senokot and suppository AM of 4/11/2024 as well as enema with good results.  - Encouraged continued stool softeners at discharge to avoid constipation.  - Follow-up with PCP regarding chronic changes to left kidney.     Pancreatic lesions  *Incidentally noted on the CT to have 1 cm hypodense lesion in the pancreatic body with some dilatation of extrahepatic bile ducts.  *MRCP completed overnight on 4/11/2024. 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 No significant main duct dilatation or other suspicious feature.   - Recommend follow up with PCP and possible GI referral. I discussed in detail incidental findings with patient and her daughter, they voiced understanding.      CKD stage III  Baseline creatinine 1-1.3. Creatinine at presentation at baseline 1.19. At baseline by discharge. Good UOP. Avoid nephrotoxins.     Anemia, chronic  Patient denies any active bleed, hemoglobin around her baseline at presentation. Continue ferrous on discharge. Outpatient follow-up with PCP.     Atrial fibrillation on chronic anticoagulation with DOAC  Hypertension  Rheumatoid arthritis  Scleroderma  Continue PTA Xarelto and Diltiazem.    Consultations This Hospital Stay   None    Code Status   Special Code    Time Spent on this Encounter   I, Prudence Cassidy PA-C, personally saw the patient today and spent greater than 30 minutes discharging this patient.       Prudence Cassidy PA-C  [facility] EXTENDED RECOVERY AND SHORT STAY  [address] [address]  Phone: [phone]  ______________________________________________________________________    Physical Exam   Vital Signs: Temp: 98.4  F (36.9  C) Temp src: Oral BP: 101/55 Pulse: 75   Resp: 16 SpO2: 100 % O2 Device: None (Room air)    Weight: 130 lbs 0 oz   General: Awake, alert,  pleasant frail elderly female sitting upright in bed who appears stated age. Looks comfortable. No acute distress.  HEENT: Normocephalic, atraumatic  Respiratory: Clear to auscultation bilaterally, stable on room air  Cardiovascular: Regular rate and rhythm  Gastrointestinal: Soft, non-tender, non-distended. Bowel sounds present.  Skin: Warm, dry. No obvious rashes or lesions on exposed skin. Dorsalis pedis pulses palpable bilaterally.  Musculoskeletal: Moves all extremities equally.  Neurologic: AAO x3, normal strength and sensation.  Psychiatric: Appropriate mood and affect.       Primary Care Physician   Joseph Pelayo    Discharge Orders      Reason for your hospital stay    UTI  Pancreatic lesions  Constipation     Follow-up and recommended labs and tests     Follow up with primary care provider, Joseph Pelayo, within 7 days for hospital follow- up and to follow up on results.  We also recommend discussing incidental findings found on imaging during hospitalization.     Activity    Your activity upon discharge: activity as tolerated and no driving for today     Diet    Follow this diet upon discharge: Orders Placed This Encounter      Regular Diet Adult       Significant Results and Procedures   Results for orders placed or performed during the hospital encounter of 04/10/24   CT Abdomen Pelvis w Contrast    Narrative    CT ABDOMEN PELVIS W CONTRAST 4/10/2024 11:32 AM    CLINICAL HISTORY: LLQ pain, nausea    TECHNIQUE: CT scan of the abdomen and pelvis was performed following  injection of IV contrast. Multiplanar reformats were obtained. Dose  reduction techniques were used.  CONTRAST: 65mL Isouve-370    COMPARISON: 4/7/2024 abdominal radiograph.    FINDINGS:   LOWER CHEST: Partly visualized marked biatrial enlargement and  cardiomegaly. The visualized lung bases are clear.    HEPATOBILIARY: No focal lesions of the liver. No calcified gallstones.  Mild extrahepatic biliary ductal dilatation with  smooth tapering  towards the ampulla. The common hepatic duct measures 10 mm, upper  normal for age..    PANCREAS: Mild diffuse age-related pancreatic parenchymal atrophy and  prominence of the main pancreatic duct. 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    SPLEEN: A few punctate calcified splenic granuloma.    ADRENAL GLANDS: Normal.    KIDNEYS/BLADDER: Severe left hydronephrosis with marked thinning of  the left renal cortex. No dilatation of the left ureter with narrowing  at the left ureteropelvic junction. No left renal calculi and no  masses at the ureterovesicular junction. Mild left perinephric fat  stranding. No right renal calculi, hydronephrosis, perinephric  stranding or suspicious masses with a few subcentimeter right renal  cysts. Partly distended urinary bladder without calculi or focal  masses.    BOWEL: Severe diverticulosis in the sigmoid colon without CT evidence  for acute diverticulitis. Moderate to large amount of formed stool  throughout the colon. No small bowel or colonic obstruction or  inflammatory changes. The appendix is not visualized but there are no  focal inflammatory changes in the right lower quadrant.    PELVIC ORGANS: No pelvic or adnexal masses.     ADDITIONAL FINDINGS: No free fluid or fluid collections. No  lymphadenopathy. No abdominal aneurysm.    MUSCULOSKELETAL: No suspicious lesions of the bones.      Impression    IMPRESSION:   1.  Findings of chronic left ureteropelvic junction obstruction with  severe left hydronephrosis and atrophic markedly thinned left renal  cortex. Stenosis at the left ureteropelvic junction without  obstructing stones or masses.  2.  Mild left perinephric fat stranding inferior to the left kidney,  suggestive of superimposed acute left-sided pyelitis/pyelonephritis.  3.  Indeterminate 1 cm hypodense lesion in the pancreatic body, likely  a side branch  intraductal papillary neoplasm. Consider nonemergent  follow-up MRCP.  4.  Upper normal sized extrahepatic bile ducts, may be secondary to  age-related ectasia. Attention on MRCP.  5.  Partly visualized cardiomegaly with biatrial cardiac enlargement.    DIONICIO CHÁVEZ MD         SYSTEM ID:  [id number]   MR Abdomen MRCP w/o & w Contrast    Narrative    EXAM: MR ABDOMEN MRCP W/O and W CONTRAST  LOCATION: [facility]  DATE: 4/11/2024    INDICATION: Pancreatic lesion and biliary dilatation  COMPARISON: CT abdomen/pelvis with contrast 04/10/2024  TECHNIQUE: Routine MR liver/pancreas protocol including axial and coronal MRCP sequences. 2D and 3D reconstruction performed by MR technologist including MIP reconstruction and slab cholangiograms. If performed with contrast, additional dynamic T1 post   IV contrast images.  CONTRAST: 6 Gadavist     FINDINGS:     MRCP: Unremarkable gallbladder. Mild intra and extrahepatic bile duct dilatation. Common duct measures up to 10 mm (series 8 image 12). No evidence of obstructing mass lesion or choledocholithiasis. Finding may be due to age-related ectasia.    LIVER: Tiny hepatic cysts which require no dedicated follow-up.    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 No significant main duct dilatation or other suspicious feature.    ADDITIONAL FINDINGS: Unremarkable lung bases. Unremarkable spleen. Unremarkable adrenal glands. Small right renal cysts which require no dedicated follow-up. No right hydronephrosis. Severe dilatation of the left renal collecting system with associated   severe left renal cortical atrophy, findings suggestive of chronic left ureteropelvic junction obstruction. Trace fluid adjacent to the inferior pole on the left which may suggest mild superimposed acute pyelonephritis. Colonic diverticulosis. Moderate   colonic stool. No  small bowel obstruction. Small sliding-type hiatal hernia. No lymphadenopathy. No AAA. Degenerative changes of the thoracic and lumbosacral spine. No acute osseous abnormality.      Impression    IMPRESSION:  1.  Mild intra and extrahepatic bile duct dilatation with the common duct measuring up to 10 mm. No evidence of obstructing mass lesion or choledocholithiasis. Finding may be due to age-related duct ectasia.  2.  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 No significant main duct dilatation or other suspicious feature.  3.  Findings of chronic left ureteropelvic junction obstruction. Trace fluid adjacent to the inferior pole on the left which may suggest mild superimposed acute pyelonephritis.  4.  Colonic diverticulosis. Moderate colonic stool.  5.  Small sliding-type hiatal hernia.       Discharge Medications   Current Discharge Medication List        START taking these medications    Details   acetaminophen (TYLENOL) 500 MG tablet Take 2 tablets (1,000 mg) by mouth every 6 hours as needed    Associated Diagnoses: Acute pyelonephritis      cefdinir (OMNICEF) 300 MG capsule Take 1 capsule (300 mg) by mouth 2 times daily for 4 days  Qty: 8 capsule, Refills: 0    Associated Diagnoses: Acute pyelonephritis           CONTINUE these medications which have NOT CHANGED    Details   atorvastatin (LIPITOR) 20 MG tablet Take 1 tablet (20 mg) by mouth daily  Qty: 30 tablet, Refills: 11    Associated Diagnoses: Coronary artery disease involving native coronary artery of native heart without angina pectoris      bisacodyl (DULCOLAX) 10 MG suppository Place 1 suppository (10 mg) rectally daily as needed for constipation  Qty: 60 suppository, Refills: 0    Associated Diagnoses: Constipation, unspecified constipation type      calcium citrate-vitamin D (CITRACAL) 315-200 MG-UNIT TABS Take 1 tablet by mouth daily      Cholecalciferol (VITAMIN D3  PO) Take 1,000 Units by mouth daily      diltiazem (CARTIA XT) 240 MG 24 hr capsule Take 1 capsule (240 mg) by mouth daily  Qty: 90 capsule, Refills: 3    Associated Diagnoses: Benign essential hypertension; Chronic atrial fibrillation (H); Paroxysmal atrial fibrillation (H)      docusate sodium (COLACE) 100 MG capsule Take 1 capsule (100 mg) by mouth 2 times daily  Qty: 30 capsule, Refills: 0    Associated Diagnoses: Constipation, unspecified constipation type      ferrous sulfate (IRON) 325 (65 FE) MG tablet Take 325 mg by mouth daily (with breakfast) Once a week      !! Multiple Vitamins-Minerals (MULTI COMPLETE PO) Take 1 tablet by mouth daily      !! Multiple Vitamins-Minerals (PRESERVISION AREDS 2 PO) Take 1 capsule by mouth 2 times daily      Omeprazole 20 MG TBDD Take 20 mg by mouth 2 times daily (before meals)      rivaroxaban ANTICOAGULANT (XARELTO) 15 MG TABS tablet Take 1 tablet (15 mg) by mouth daily (with dinner)  Qty: 90 tablet, Refills: 11    Associated Diagnoses: Paroxysmal atrial fibrillation (H)       !! - Potential duplicate medications found. Please discuss with provider.        Allergies   Allergies   Allergen Reactions    Smz-Tmp Ds [Sulfamethoxazole-Trimethoprim] Rash     Itching. (800/160 mg twice day)    Amoxicillin     Amoxicillin-Pot Clavulanate     Cephalexin     Doxycycline Nausea     Headaches

## 2024-04-17 DIAGNOSIS — N18.32 STAGE 3B CHRONIC KIDNEY DISEASE (H): Primary | ICD-10-CM

## 2024-04-17 DIAGNOSIS — N13.30 HYDRONEPHROSIS OF LEFT KIDNEY: ICD-10-CM

## 2024-05-02 DIAGNOSIS — N18.9 CHRONIC KIDNEY DISEASE, UNSPECIFIED CKD STAGE: Primary | ICD-10-CM

## 2024-05-13 ENCOUNTER — LAB (OUTPATIENT)
Dept: LAB | Facility: CLINIC | Age: 89
End: 2024-05-13
Attending: PHYSICIAN ASSISTANT
Payer: MEDICARE

## 2024-05-13 ENCOUNTER — HOSPITAL ENCOUNTER (OUTPATIENT)
Dept: ULTRASOUND IMAGING | Facility: CLINIC | Age: 89
Discharge: HOME | End: 2024-05-13
Attending: PHYSICIAN ASSISTANT
Payer: MEDICARE

## 2024-05-13 DIAGNOSIS — N18.9 CHRONIC KIDNEY DISEASE, UNSPECIFIED CKD STAGE: ICD-10-CM

## 2024-05-13 DIAGNOSIS — N13.30 HYDRONEPHROSIS OF LEFT KIDNEY: ICD-10-CM

## 2024-05-13 DIAGNOSIS — N18.32 STAGE 3B CHRONIC KIDNEY DISEASE (H): ICD-10-CM

## 2024-05-13 LAB
ANION GAP SERPL CALCULATED.3IONS-SCNC: 8 MMOL/L (ref 7–15)
BUN SERPL-MCNC: 21.1 MG/DL (ref 8–23)
CALCIUM SERPL-MCNC: 10 MG/DL (ref 8.8–10.2)
CHLORIDE SERPL-SCNC: 108 MMOL/L (ref 98–107)
CREAT SERPL-MCNC: 1.11 MG/DL (ref 0.51–0.95)
DEPRECATED HCO3 PLAS-SCNC: 24 MMOL/L (ref 22–29)
EGFRCR SERPLBLD CKD-EPI 2021: 48 ML/MIN/1.73M2
GLUCOSE SERPL-MCNC: 103 MG/DL (ref 70–99)
POTASSIUM SERPL-SCNC: 4.2 MMOL/L (ref 3.4–5.3)
SODIUM SERPL-SCNC: 140 MMOL/L (ref 135–145)

## 2024-05-13 PROCEDURE — 76770 US EXAM ABDO BACK WALL COMP: CPT

## 2024-05-13 PROCEDURE — 80048 BASIC METABOLIC PNL TOTAL CA: CPT

## 2024-05-13 PROCEDURE — 36415 COLL VENOUS BLD VENIPUNCTURE: CPT

## 2024-07-26 ENCOUNTER — ANCILLARY PROCEDURE (OUTPATIENT)
Dept: GENERAL RADIOLOGY | Facility: CLINIC | Age: 89
End: 2024-07-26
Attending: FAMILY MEDICINE
Payer: MEDICARE

## 2024-07-26 ENCOUNTER — OFFICE VISIT (OUTPATIENT)
Dept: URGENT CARE | Facility: URGENT CARE | Age: 89
End: 2024-07-26
Payer: MEDICARE

## 2024-07-26 VITALS
DIASTOLIC BLOOD PRESSURE: 64 MMHG | WEIGHT: 131 LBS | OXYGEN SATURATION: 100 % | HEART RATE: 60 BPM | BODY MASS INDEX: 23.21 KG/M2 | TEMPERATURE: 98 F | SYSTOLIC BLOOD PRESSURE: 110 MMHG | RESPIRATION RATE: 20 BRPM

## 2024-07-26 DIAGNOSIS — M25.551 HIP PAIN, RIGHT: Primary | ICD-10-CM

## 2024-07-26 PROCEDURE — 99213 OFFICE O/P EST LOW 20 MIN: CPT | Performed by: FAMILY MEDICINE

## 2024-07-26 PROCEDURE — 73502 X-RAY EXAM HIP UNI 2-3 VIEWS: CPT | Mod: TC | Performed by: RADIOLOGY

## 2024-07-26 NOTE — PROGRESS NOTES
Chief Complaint   Patient presents with    Musculoskeletal Problem     Right hip pain x 10 weeks.  Has been seen for this and sent to PT.  Pain remains  Wants to make sure any xrays go to [facility].     Marcia was seen today for musculoskeletal problem.    Diagnoses and all orders for this visit:    Hip pain, right  -     Cancel: XR Hip Right 2-3 Views  -     XR Pelvis and Hip Right 1 View      D/d   lumbar strain, osteoarthritis , and muscle spasm, degenerative changes     PLAN:  For acute pain, rest, intermittent application of cold packs (later, may switch to heat, but do not sleep on heating pad), analgesics and  are recommended. Discussed longer term treatment plan of prn NSAID's and discussed a home back care exercise program with flexion exercise routine. Proper lifting with avoidance of heavy lifting discussed. Consider Physical Therapy   I did review with patient that with the duration of the symptoms being so long should consider following up with Ortho and consider injections         SUBJECTIVE:   Marcia Gary is a 88 year old female who complains of an injury causing low back pain and also right sided hip pain along the tight iliac crest happened 10  weeks ago.  Injury happened while she was sided tremor using the pain is positional with bending or lifting, without radiation down the legs. Symptoms have been gradual since that time.  Has tried physical therapy with some relief of symptoms but the pain continues to be there.  Prior history of back problems: no prior back problems. There is no numbness in the legs.    OBJECTIVE:  Vital signs as noted above. Patient appears to be in mild to moderate pain, antalgic gait noted. Lumbosacral spine area reveals no local tenderness or mass. Painful and reduced LS ROM noted. Straight leg raise is negative  DTR's, motor strength and sensation normal, including heel and toe gait.   X-Ray: shows DJD changes, likely chronic.  Generalized  demineralization. No fracture or concerning bone  lesion.    Nevaeh Morgan MD

## 2024-09-26 ENCOUNTER — APPOINTMENT (OUTPATIENT)
Dept: CT IMAGING | Facility: CLINIC | Age: 89
End: 2024-09-26
Attending: EMERGENCY MEDICINE
Payer: MEDICARE

## 2024-09-26 ENCOUNTER — APPOINTMENT (OUTPATIENT)
Dept: MRI IMAGING | Facility: CLINIC | Age: 89
End: 2024-09-26
Attending: EMERGENCY MEDICINE
Payer: MEDICARE

## 2024-09-26 ENCOUNTER — HOSPITAL ENCOUNTER (OUTPATIENT)
Facility: CLINIC | Age: 89
Setting detail: OBSERVATION
Discharge: HOME | End: 2024-09-29
Attending: EMERGENCY MEDICINE | Admitting: INTERNAL MEDICINE
Payer: MEDICARE

## 2024-09-26 DIAGNOSIS — M54.41 ACUTE RIGHT-SIDED LOW BACK PAIN WITH RIGHT-SIDED SCIATICA: ICD-10-CM

## 2024-09-26 LAB
ALBUMIN SERPL BCG-MCNC: 3.9 G/DL (ref 3.5–5.2)
ALBUMIN UR-MCNC: 30 MG/DL
ALP SERPL-CCNC: 112 U/L (ref 40–150)
ALT SERPL W P-5'-P-CCNC: 16 U/L (ref 0–50)
ANION GAP SERPL CALCULATED.3IONS-SCNC: 9 MMOL/L (ref 7–15)
APPEARANCE UR: CLEAR
AST SERPL W P-5'-P-CCNC: 19 U/L (ref 0–45)
BASOPHILS # BLD AUTO: 0 10E3/UL (ref 0–0.2)
BASOPHILS NFR BLD AUTO: 0 %
BILIRUB SERPL-MCNC: 0.5 MG/DL
BILIRUB UR QL STRIP: NEGATIVE
BUN SERPL-MCNC: 24.6 MG/DL (ref 8–23)
CALCIUM SERPL-MCNC: 10.1 MG/DL (ref 8.8–10.4)
CHLORIDE SERPL-SCNC: 105 MMOL/L (ref 98–107)
COLOR UR AUTO: YELLOW
CREAT SERPL-MCNC: 1.03 MG/DL (ref 0.51–0.95)
EGFRCR SERPLBLD CKD-EPI 2021: 53 ML/MIN/1.73M2
EOSINOPHIL # BLD AUTO: 0.1 10E3/UL (ref 0–0.7)
EOSINOPHIL NFR BLD AUTO: 1 %
ERYTHROCYTE [DISTWIDTH] IN BLOOD BY AUTOMATED COUNT: 15.3 % (ref 10–15)
GLUCOSE SERPL-MCNC: 104 MG/DL (ref 70–99)
GLUCOSE UR STRIP-MCNC: NEGATIVE MG/DL
HCO3 SERPL-SCNC: 26 MMOL/L (ref 22–29)
HCT VFR BLD AUTO: 39.5 % (ref 35–47)
HGB BLD-MCNC: 12.6 G/DL (ref 11.7–15.7)
HGB UR QL STRIP: NEGATIVE
HOLD SPECIMEN: NORMAL
IMM GRANULOCYTES # BLD: 0 10E3/UL
IMM GRANULOCYTES NFR BLD: 0 %
KETONES UR STRIP-MCNC: NEGATIVE MG/DL
LEUKOCYTE ESTERASE UR QL STRIP: NEGATIVE
LYMPHOCYTES # BLD AUTO: 1.2 10E3/UL (ref 0.8–5.3)
LYMPHOCYTES NFR BLD AUTO: 18 %
MCH RBC QN AUTO: 31.8 PG (ref 26.5–33)
MCHC RBC AUTO-ENTMCNC: 31.9 G/DL (ref 31.5–36.5)
MCV RBC AUTO: 100 FL (ref 78–100)
MONOCYTES # BLD AUTO: 0.7 10E3/UL (ref 0–1.3)
MONOCYTES NFR BLD AUTO: 10 %
NEUTROPHILS # BLD AUTO: 4.7 10E3/UL (ref 1.6–8.3)
NEUTROPHILS NFR BLD AUTO: 71 %
NITRATE UR QL: NEGATIVE
NRBC # BLD AUTO: 0 10E3/UL
NRBC BLD AUTO-RTO: 0 /100
PH UR STRIP: 6 [PH] (ref 5–7)
PLATELET # BLD AUTO: 202 10E3/UL (ref 150–450)
POTASSIUM SERPL-SCNC: 4.5 MMOL/L (ref 3.4–5.3)
PROT SERPL-MCNC: 6.9 G/DL (ref 6.4–8.3)
RBC # BLD AUTO: 3.96 10E6/UL (ref 3.8–5.2)
RBC URINE: <1 /HPF
SODIUM SERPL-SCNC: 140 MMOL/L (ref 135–145)
SP GR UR STRIP: 1.01 (ref 1–1.03)
SQUAMOUS EPITHELIAL: <1 /HPF
UROBILINOGEN UR STRIP-MCNC: NORMAL MG/DL
WBC # BLD AUTO: 6.7 10E3/UL (ref 4–11)
WBC URINE: <1 /HPF

## 2024-09-26 PROCEDURE — G0378 HOSPITAL OBSERVATION PER HR: HCPCS | Performed by: PHYSICAL THERAPIST

## 2024-09-26 PROCEDURE — 99222 1ST HOSP IP/OBS MODERATE 55: CPT | Performed by: INTERNAL MEDICINE

## 2024-09-26 PROCEDURE — 99285 EMERGENCY DEPT VISIT HI MDM: CPT | Mod: 25

## 2024-09-26 PROCEDURE — 85025 COMPLETE CBC W/AUTO DIFF WBC: CPT | Performed by: EMERGENCY MEDICINE

## 2024-09-26 PROCEDURE — 72192 CT PELVIS W/O DYE: CPT | Mod: MA

## 2024-09-26 PROCEDURE — 36415 COLL VENOUS BLD VENIPUNCTURE: CPT | Performed by: EMERGENCY MEDICINE

## 2024-09-26 PROCEDURE — G0378 HOSPITAL OBSERVATION PER HR: HCPCS

## 2024-09-26 PROCEDURE — 72131 CT LUMBAR SPINE W/O DYE: CPT | Mod: MA

## 2024-09-26 PROCEDURE — 80053 COMPREHEN METABOLIC PANEL: CPT | Performed by: EMERGENCY MEDICINE

## 2024-09-26 PROCEDURE — 72148 MRI LUMBAR SPINE W/O DYE: CPT | Mod: MA

## 2024-09-26 PROCEDURE — 81001 URINALYSIS AUTO W/SCOPE: CPT | Performed by: EMERGENCY MEDICINE

## 2024-09-26 PROCEDURE — 250N000013 HC RX MED GY IP 250 OP 250 PS 637: Performed by: EMERGENCY MEDICINE

## 2024-09-26 RX ORDER — HYDROMORPHONE HYDROCHLORIDE 2 MG/1
2 TABLET ORAL EVERY 4 HOURS PRN
Status: DISCONTINUED | OUTPATIENT
Start: 2024-09-26 | End: 2024-09-29 | Stop reason: HOSPADM

## 2024-09-26 RX ORDER — ACETAMINOPHEN 500 MG
1000 TABLET ORAL EVERY 6 HOURS PRN
Status: DISCONTINUED | OUTPATIENT
Start: 2024-09-26 | End: 2024-09-26

## 2024-09-26 RX ORDER — ACETAMINOPHEN 650 MG/1
650 SUPPOSITORY RECTAL EVERY 4 HOURS PRN
Status: DISCONTINUED | OUTPATIENT
Start: 2024-09-26 | End: 2024-09-29 | Stop reason: HOSPADM

## 2024-09-26 RX ORDER — GABAPENTIN 100 MG/1
100 CAPSULE ORAL DAILY
Status: DISCONTINUED | OUTPATIENT
Start: 2024-09-27 | End: 2024-09-29 | Stop reason: HOSPADM

## 2024-09-26 RX ORDER — NALOXONE HYDROCHLORIDE 0.4 MG/ML
0.2 INJECTION, SOLUTION INTRAMUSCULAR; INTRAVENOUS; SUBCUTANEOUS
Status: DISCONTINUED | OUTPATIENT
Start: 2024-09-26 | End: 2024-09-29 | Stop reason: HOSPADM

## 2024-09-26 RX ORDER — ONDANSETRON 4 MG/1
4 TABLET, ORALLY DISINTEGRATING ORAL EVERY 6 HOURS PRN
Status: DISCONTINUED | OUTPATIENT
Start: 2024-09-26 | End: 2024-09-29 | Stop reason: HOSPADM

## 2024-09-26 RX ORDER — VIT C/E/ZN/COPPR/LUTEIN/ZEAXAN 60 MG-6 MG
1 CAPSULE ORAL 2 TIMES DAILY
Status: DISCONTINUED | OUTPATIENT
Start: 2024-09-27 | End: 2024-09-29 | Stop reason: HOSPADM

## 2024-09-26 RX ORDER — CYCLOBENZAPRINE HCL 10 MG
10 TABLET ORAL 3 TIMES DAILY PRN
Status: DISCONTINUED | OUTPATIENT
Start: 2024-09-26 | End: 2024-09-29 | Stop reason: HOSPADM

## 2024-09-26 RX ORDER — DILTIAZEM HYDROCHLORIDE 240 MG/1
240 CAPSULE, COATED, EXTENDED RELEASE ORAL DAILY
Status: DISCONTINUED | OUTPATIENT
Start: 2024-09-27 | End: 2024-09-29 | Stop reason: HOSPADM

## 2024-09-26 RX ORDER — LIDOCAINE 40 MG/G
CREAM TOPICAL
Status: DISCONTINUED | OUTPATIENT
Start: 2024-09-26 | End: 2024-09-29 | Stop reason: HOSPADM

## 2024-09-26 RX ORDER — ACETAMINOPHEN AND CODEINE PHOSPHATE 300; 30 MG/1; MG/1
2 TABLET ORAL ONCE
Status: COMPLETED | OUTPATIENT
Start: 2024-09-26 | End: 2024-09-26

## 2024-09-26 RX ORDER — CALCIUM CARBONATE 500 MG/1
1000 TABLET, CHEWABLE ORAL 4 TIMES DAILY PRN
Status: DISCONTINUED | OUTPATIENT
Start: 2024-09-26 | End: 2024-09-29 | Stop reason: HOSPADM

## 2024-09-26 RX ORDER — AMOXICILLIN 250 MG
1 CAPSULE ORAL 2 TIMES DAILY PRN
Status: DISCONTINUED | OUTPATIENT
Start: 2024-09-26 | End: 2024-09-29 | Stop reason: HOSPADM

## 2024-09-26 RX ORDER — NALOXONE HYDROCHLORIDE 0.4 MG/ML
0.4 INJECTION, SOLUTION INTRAMUSCULAR; INTRAVENOUS; SUBCUTANEOUS
Status: DISCONTINUED | OUTPATIENT
Start: 2024-09-26 | End: 2024-09-29 | Stop reason: HOSPADM

## 2024-09-26 RX ORDER — ATORVASTATIN CALCIUM 20 MG/1
20 TABLET, FILM COATED ORAL DAILY
Status: DISCONTINUED | OUTPATIENT
Start: 2024-09-27 | End: 2024-09-29 | Stop reason: HOSPADM

## 2024-09-26 RX ORDER — AMOXICILLIN 250 MG
1 CAPSULE ORAL 2 TIMES DAILY
Status: DISCONTINUED | OUTPATIENT
Start: 2024-09-27 | End: 2024-09-29 | Stop reason: HOSPADM

## 2024-09-26 RX ORDER — CYCLOBENZAPRINE HCL 10 MG
10 TABLET ORAL 3 TIMES DAILY PRN
COMMUNITY

## 2024-09-26 RX ORDER — ONDANSETRON 2 MG/ML
4 INJECTION INTRAMUSCULAR; INTRAVENOUS EVERY 6 HOURS PRN
Status: DISCONTINUED | OUTPATIENT
Start: 2024-09-26 | End: 2024-09-29 | Stop reason: HOSPADM

## 2024-09-26 RX ORDER — ACETAMINOPHEN 325 MG/1
650 TABLET ORAL EVERY 4 HOURS PRN
Status: DISCONTINUED | OUTPATIENT
Start: 2024-09-26 | End: 2024-09-29 | Stop reason: HOSPADM

## 2024-09-26 RX ORDER — AMOXICILLIN 250 MG
2 CAPSULE ORAL 2 TIMES DAILY
Status: DISCONTINUED | OUTPATIENT
Start: 2024-09-27 | End: 2024-09-29 | Stop reason: HOSPADM

## 2024-09-26 RX ORDER — PANTOPRAZOLE SODIUM 40 MG/1
40 TABLET, DELAYED RELEASE ORAL
Status: DISCONTINUED | OUTPATIENT
Start: 2024-09-27 | End: 2024-09-29 | Stop reason: HOSPADM

## 2024-09-26 RX ORDER — AMOXICILLIN 250 MG
2 CAPSULE ORAL 2 TIMES DAILY PRN
Status: DISCONTINUED | OUTPATIENT
Start: 2024-09-26 | End: 2024-09-29 | Stop reason: HOSPADM

## 2024-09-26 RX ADMIN — ACETAMINOPHEN AND CODEINE PHOSPHATE 2 TABLET: 300; 30 TABLET ORAL at 16:55

## 2024-09-26 ASSESSMENT — ACTIVITIES OF DAILY LIVING (ADL)
ADLS_ACUITY_SCORE: 35

## 2024-09-26 ASSESSMENT — COLUMBIA-SUICIDE SEVERITY RATING SCALE - C-SSRS
2. HAVE YOU ACTUALLY HAD ANY THOUGHTS OF KILLING YOURSELF IN THE PAST MONTH?: NO
6. HAVE YOU EVER DONE ANYTHING, STARTED TO DO ANYTHING, OR PREPARED TO DO ANYTHING TO END YOUR LIFE?: NO
1. IN THE PAST MONTH, HAVE YOU WISHED YOU WERE DEAD OR WISHED YOU COULD GO TO SLEEP AND NOT WAKE UP?: NO

## 2024-09-26 NOTE — ED TRIAGE NOTES
Patient having right sided lower abdominal pain, radiating to the right flank since Wednesday of last week. Denies changes in urination.     Triage Assessment (Adult)       Row Name 09/26/24 1314          Triage Assessment    Airway WDL WDL        Respiratory WDL    Respiratory WDL WDL        Skin Circulation/Temperature WDL    Skin Circulation/Temperature WDL WDL        Cardiac WDL    Cardiac WDL WDL        Peripheral/Neurovascular WDL    Peripheral Neurovascular WDL WDL        Cognitive/Neuro/Behavioral WDL    Cognitive/Neuro/Behavioral WDL WDL

## 2024-09-26 NOTE — ED NOTES
[facility]  ED Nurse Handoff Report    ED Chief complaint: Abdominal Pain and Flank Pain      ED Diagnosis:   Final diagnoses:   Acute right-sided low back pain with right-sided sciatica       Code Status: Full Code    Allergies:   Allergies   Allergen Reactions    Smz-Tmp Ds [Sulfamethoxazole-Trimethoprim] Rash     Itching. (800/160 mg twice day)    Amoxicillin     Amoxicillin-Pot Clavulanate     Cephalexin     Doxycycline Nausea     Headaches        Patient Story: abdominal pain  Focused Assessment:  Patient with RLQ abdomen pain radiating to right flank area since Wednesday, will be admitted for further observation    Treatments and/or interventions provided: See MAR  Patient's response to treatments and/or interventions: Tylenol w Codeine helped with pain    To be done/followed up on inpatient unit:  close monitoring    Does this patient have any cognitive concerns?:  na    Activity level - Baseline/Home:  Independent  Activity Level - Current:   Stand with Assist    Patient's Preferred language: English   Needed?: No    Isolation: None  Infection: Not Applicable     Patient tested for COVID 19 prior to admission: NO  Bariatric?: No    Vital Signs:   Vitals:    09/26/24 1313 09/26/24 1316   BP:  134/65   Pulse:  70   Resp:  16   Temp: 98.4  F (36.9  C)    TempSrc: Oral    SpO2:  100%       Cardiac Rhythm:     Was the PSS-3 completed:   Yes  What interventions are required if any?               Family Comments: family at bedside  OBS brochure/video discussed/provided to patient/family: Yes              Name of person given brochure if not patient: na              Relationship to patient: na    For the majority of the shift this patient's behavior was Green.   Behavioral interventions performed were none.    ED NURSE PHONE NUMBER: *[id number]

## 2024-09-26 NOTE — ED PROVIDER NOTES
Emergency Department Note      History of Present Illness     Chief Complaint   Abdominal Pain and Flank Pain      HPI   Marcia Gary is a 88 year old female who is anticoagulated on Xarelto with a history of atrial fibrillation, hypertension, rheumatoid arthritis who presents with right flank pain. Patient notes that she has had right flank pain for the past week. Patient describes that her right flank pain occasionally radiates to her right leg. Patient's daughter-in-law notes that the patient went to [facility] to monitor her recent foot fracture and also had a negative right hip x-ray. [facility] noted that the patient might be having a right-sided sciatica nerve pinch due to her right left lower extremity radiation. Patient states that her pain is not worse with deep respiration. She states that she addressing her pain with flexeril and gabapentin with only minimal relief. She denies taking Tylenol for pain management. She endorses a history of CKD. She denies nausea, vomiting, diarrhea, fever, chills, coughs. She denies dysuria.    Independent Historian   None    Review of External Notes   I reviewed medical records from: Nephrology office visit on 8/9/2024    Past Medical History     Medical History and Problem List   Chronic atrial fibrillation (H)   Benign essential hypertension  Sleep apnea  Chronic kidney disease  Scleroderma (H)  Rheumatic fever  Rheumatoid arthritis (H)  Mitral regurgitation  Acute pyelonephritis    Medications   Lipitor  Cartia  Colace  Xarelto  Flexeril  Omeprazole    Surgical History   Coronary angiography    Physical Exam     Patient Vitals for the past 24 hrs:   BP Temp Temp src Pulse Resp SpO2   09/26/24 1316 134/65 -- -- 70 16 100 %   09/26/24 1313 -- 98.4  F (36.9  C) Oral -- -- --     Physical Exam  Constitutional: Well developed, nontox appearance  Head: Atraumatic.   Neck:  no stridor  Eyes: no scleral icterus  Cardiovascular: RRR, 2+ bilat  radial pulses  Pulmonary/Chest: nml resp effort  Abdominal: ND, soft, NT, no rebound or guarding   Back: reproducible R paraspinal lumbar tenderness, no T or L spine tenderness  : no CVA tenderness bilat  Ext: Warm, well perfused, no edema  Neurological: A&O, symmetric facies, moves ext x4, 5/5 strength throughout BLE, sensation grossly intact to light touch  Skin: Skin is warm and dry.   Psychiatric: Behavior is normal. Thought content normal.   Nursing note and vitals reviewed.      Diagnostics     Lab Results   Labs Ordered and Resulted from Time of ED Arrival to Time of ED Departure   COMPREHENSIVE METABOLIC PANEL - Abnormal       Result Value    Sodium 140      Potassium 4.5      Carbon Dioxide (CO2) 26      Anion Gap 9      Urea Nitrogen 24.6 (*)     Creatinine 1.03 (*)     GFR Estimate 53 (*)     Calcium 10.1      Chloride 105      Glucose 104 (*)     Alkaline Phosphatase 112      AST 19      ALT 16      Protein Total 6.9      Albumin 3.9      Bilirubin Total 0.5     ROUTINE UA WITH MICROSCOPIC REFLEX TO CULTURE - Abnormal    Color Urine Yellow      Appearance Urine Clear      Glucose Urine Negative      Bilirubin Urine Negative      Ketones Urine Negative      Specific Gravity Urine 1.015      Blood Urine Negative      pH Urine 6.0      Protein Albumin Urine 30 (*)     Urobilinogen Urine Normal      Nitrite Urine Negative      Leukocyte Esterase Urine Negative      RBC Urine <1      WBC Urine <1      Squamous Epithelials Urine <1     CBC WITH PLATELETS AND DIFFERENTIAL - Abnormal    WBC Count 6.7      RBC Count 3.96      Hemoglobin 12.6      Hematocrit 39.5            MCH 31.8      MCHC 31.9      RDW 15.3 (*)     Platelet Count 202      % Neutrophils 71      % Lymphocytes 18      % Monocytes 10      % Eosinophils 1      % Basophils 0      % Immature Granulocytes 0      NRBCs per 100 WBC 0      Absolute Neutrophils 4.7      Absolute Lymphocytes 1.2      Absolute Monocytes 0.7      Absolute  Eosinophils 0.1      Absolute Basophils 0.0      Absolute Immature Granulocytes 0.0      Absolute NRBCs 0.0         Imaging   CT Pelvis Bone wo Contrast   Final Result   IMPRESSION:   1.  Within the limitations of osseous demineralization, no fracture is   seen. If there is persistent clinical concern for occult fracture, MRI   is more sensitive.   2.  Mild hip degenerative change.   3.  Moderate degenerative changes of the sacroiliac joints, left   greater than right.      Lamberto Elmore MD            SYSTEM ID:  [id number]      CT Lumbar Spine w/o Contrast   Final Result   IMPRESSION:  No acute fracture.       KILO ANDRADE MD            SYSTEM ID:  [id number]      Lumbar spine MRI w/o contrast    (Results Pending)       Independent Interpretation   None    ED Course      Medications Administered   Medications   acetaminophen-codeine (TYLENOL #3) 300-30 MG per tablet 2 tablet (2 tablets Oral $Given 9/26/24 3725)       Procedures   Procedures     Discussion of Management   Admitting Hospitalist, Dr. Jones    ED Course   ED Course as of 09/26/24 1737   Thu Sep 26, 2024   1348 I obtained history and examined the patient as noted above     9379 I discussed my findings with Dr. Jones, hospitalist. They will admit my patient.        Additional Documentation  Social determinants affecting patient's health include: Age increasing risk for presentation to the emergency department    Medical Decision Making / Diagnosis     CMS Diagnoses: None    MIPS       None    [facility]   Marcia Gary is a 88 year old female presenting w/ right flank/hip pain with radiation down anterior thigh worse with walking     Differential diagnosis includes muscle strain, muscle spasm, lumbar radiculopathy/sciatica.  Doubt ureteral stone, appendicitis, vascular catastrophe given history and physical exam.  CT imaging as noted above.  Interventions as noted above.  No evidence of acute fracture on CT.  Given the patient's age, ongoing pain and  difficulty ambulating secondary to pain, she is admitted to the hospitalist service under observation status with MRI lumbar spine pending for further evaluation.  Screening labs large unremarkable for acute abnormality.  Pt and daughter counseled on all results, disposition and diagnosis.  They are understanding and agreeable to plan. Patient admitted in guarded condition.      Disposition   The patient was admitted to the hospital.     Diagnosis     ICD-10-CM    1. Acute right-sided low back pain with right-sided sciatica  M54.41            Discharge Medications   New Prescriptions    No medications on file         Scribe Disclosure:  I, Alfonso Felipe, am serving as a scribe at 5:30 PM on 9/26/2024 to document services personally performed by Homero Pride MD based on my observations and the provider's statements to me.        Homero Pride MD  09/26/24 4149

## 2024-09-27 ENCOUNTER — APPOINTMENT (OUTPATIENT)
Dept: PHYSICAL THERAPY | Facility: CLINIC | Age: 89
End: 2024-09-27
Attending: INTERNAL MEDICINE
Payer: MEDICARE

## 2024-09-27 LAB
ANION GAP SERPL CALCULATED.3IONS-SCNC: 7 MMOL/L (ref 7–15)
BUN SERPL-MCNC: 20.4 MG/DL (ref 8–23)
CALCIUM SERPL-MCNC: 9.9 MG/DL (ref 8.8–10.4)
CHLORIDE SERPL-SCNC: 104 MMOL/L (ref 98–107)
CREAT SERPL-MCNC: 0.89 MG/DL (ref 0.51–0.95)
EGFRCR SERPLBLD CKD-EPI 2021: 63 ML/MIN/1.73M2
ERYTHROCYTE [DISTWIDTH] IN BLOOD BY AUTOMATED COUNT: 15.2 % (ref 10–15)
GLUCOSE SERPL-MCNC: 114 MG/DL (ref 70–99)
HCO3 SERPL-SCNC: 30 MMOL/L (ref 22–29)
HCT VFR BLD AUTO: 39.3 % (ref 35–47)
HGB BLD-MCNC: 12.4 G/DL (ref 11.7–15.7)
MCH RBC QN AUTO: 31.6 PG (ref 26.5–33)
MCHC RBC AUTO-ENTMCNC: 31.6 G/DL (ref 31.5–36.5)
MCV RBC AUTO: 100 FL (ref 78–100)
PLATELET # BLD AUTO: 182 10E3/UL (ref 150–450)
POTASSIUM SERPL-SCNC: 4.4 MMOL/L (ref 3.4–5.3)
RBC # BLD AUTO: 3.93 10E6/UL (ref 3.8–5.2)
SODIUM SERPL-SCNC: 141 MMOL/L (ref 135–145)
WBC # BLD AUTO: 7.3 10E3/UL (ref 4–11)

## 2024-09-27 PROCEDURE — 99233 SBSQ HOSP IP/OBS HIGH 50: CPT | Performed by: INTERNAL MEDICINE

## 2024-09-27 PROCEDURE — 99204 OFFICE O/P NEW MOD 45 MIN: CPT | Performed by: PHYSICIAN ASSISTANT

## 2024-09-27 PROCEDURE — 36415 COLL VENOUS BLD VENIPUNCTURE: CPT | Performed by: INTERNAL MEDICINE

## 2024-09-27 PROCEDURE — 85027 COMPLETE CBC AUTOMATED: CPT | Performed by: INTERNAL MEDICINE

## 2024-09-27 PROCEDURE — 80048 BASIC METABOLIC PNL TOTAL CA: CPT | Performed by: INTERNAL MEDICINE

## 2024-09-27 PROCEDURE — G0378 HOSPITAL OBSERVATION PER HR: HCPCS

## 2024-09-27 PROCEDURE — 97161 PT EVAL LOW COMPLEX 20 MIN: CPT | Mod: GP | Performed by: PHYSICAL THERAPIST

## 2024-09-27 PROCEDURE — 97530 THERAPEUTIC ACTIVITIES: CPT | Mod: GP | Performed by: PHYSICAL THERAPIST

## 2024-09-27 PROCEDURE — 250N000013 HC RX MED GY IP 250 OP 250 PS 637: Performed by: INTERNAL MEDICINE

## 2024-09-27 RX ADMIN — HYDROMORPHONE HYDROCHLORIDE 2 MG: 2 TABLET ORAL at 00:06

## 2024-09-27 RX ADMIN — SENNOSIDES AND DOCUSATE SODIUM 2 TABLET: 50; 8.6 TABLET ORAL at 00:03

## 2024-09-27 RX ADMIN — DILTIAZEM HYDROCHLORIDE 240 MG: 240 CAPSULE, COATED, EXTENDED RELEASE ORAL at 08:09

## 2024-09-27 RX ADMIN — SENNOSIDES AND DOCUSATE SODIUM 1 TABLET: 50; 8.6 TABLET ORAL at 08:09

## 2024-09-27 RX ADMIN — Medication 1 CAPSULE: at 19:54

## 2024-09-27 RX ADMIN — PANTOPRAZOLE SODIUM 40 MG: 40 TABLET, DELAYED RELEASE ORAL at 06:47

## 2024-09-27 RX ADMIN — HYDROMORPHONE HYDROCHLORIDE 2 MG: 2 TABLET ORAL at 08:08

## 2024-09-27 RX ADMIN — GABAPENTIN 100 MG: 100 CAPSULE ORAL at 08:09

## 2024-09-27 RX ADMIN — ATORVASTATIN CALCIUM 20 MG: 20 TABLET, FILM COATED ORAL at 08:09

## 2024-09-27 RX ADMIN — SENNOSIDES AND DOCUSATE SODIUM 1 TABLET: 50; 8.6 TABLET ORAL at 19:55

## 2024-09-27 RX ADMIN — HYDROMORPHONE HYDROCHLORIDE 2 MG: 2 TABLET ORAL at 12:16

## 2024-09-27 RX ADMIN — PANTOPRAZOLE SODIUM 40 MG: 40 TABLET, DELAYED RELEASE ORAL at 16:31

## 2024-09-27 RX ADMIN — HYDROMORPHONE HYDROCHLORIDE 2 MG: 2 TABLET ORAL at 19:54

## 2024-09-27 ASSESSMENT — ACTIVITIES OF DAILY LIVING (ADL)
ADLS_ACUITY_SCORE: 39
ADLS_ACUITY_SCORE: 41
ADLS_ACUITY_SCORE: 39
ADLS_ACUITY_SCORE: 39
ADLS_ACUITY_SCORE: 41
ADLS_ACUITY_SCORE: 39
ADLS_ACUITY_SCORE: 39
ADLS_ACUITY_SCORE: 41
ADLS_ACUITY_SCORE: 39
ADLS_ACUITY_SCORE: 39
ADLS_ACUITY_SCORE: 41
ADLS_ACUITY_SCORE: 41
ADLS_ACUITY_SCORE: 39
ADLS_ACUITY_SCORE: 41
ADLS_ACUITY_SCORE: 39
ADLS_ACUITY_SCORE: 39
ADLS_ACUITY_SCORE: 41
ADLS_ACUITY_SCORE: 41
ADLS_ACUITY_SCORE: 39

## 2024-09-27 NOTE — PROVIDER NOTIFICATION
MD Notification    Notified Person: MD    Notified Person Name: Dr. Jones    Notification Date/Time: 09/27/24 7:14 AM     Notification Interaction: juan     Purpose of Notification: Can you place a code order for the patient, thanks     Orders Received:    Comments:

## 2024-09-27 NOTE — PROGRESS NOTES
[facility]  Hospitalist Progress Note  Aleksandr Jones MD  09/27/2024    Assessment & Plan   Marcia Gray is a 88 year old female who is anticoagulated on Xarelto with a history of atrial fibrillation, hypertension, rheumatoid arthritis who presents with right flank pain. Patient notes that she has had right flank pain for the past week but worse the past 3 days. Patient describes that her right flank pain occasionally radiates to her right leg. Patient's daughter-in-law notes that the patient went to [facility] to monitor her recent foot fracture and also had a negative right hip x-ray. [facility] noted that the patient might be having a right-sided sciatica nerve pinch due to her right left lower extremity radiation. Patient states that her pain is not worse with deep respiration. She states that she addressing her pain with flexeril and gabapentin with only minimal relief. She denies taking Tylenol for pain management. She endorses a history of CKD. She denies nausea, vomiting, diarrhea, fever, chills, coughs. She denies dysuria. She denies bowel or bladder dysfunction, RLE weakness.       In the ER she is afebrile, stable vital signs.  BMP is normal, Cr 1.03, , normal CBC.  UA normal.  CT scan of the lumbar spine showed no acute fracture moderate canal stenosis at L2-L3 and severe left foraminal narrowing at L2-L3.  There is severe canal stenosis at L3-L4 and L4-L5.  CT of the pelvis showed no acute fracture, if concern for occult fracture MRI was recommended.  The MRI revealed L2-L3 bulge with bilateral foraminal extension superimposed right foraminal protrusion with severe thecal sac stenosis measuring 6 mm with severe right foraminal stenosis with compression exiting right L2 nerve root.  There is also L2-L3 pronounced interspinous bursitis.     ## Low back pain with  ## L2-L3 interspinous bursitis  ## L2-L3 severe right foraminal stenosis with  compression of exiting L2 nerve root.   Patient will be admitted under observation status with pain control with scheduled Tylenol Neurontin physical therapy and neurosurgical evaluation. No red flag symptoms.  Patient may benefit from an initial epidural steroid injection but will defer to neurosurgery.  Admit under observation status  Schedule tylenol, add ultram for moderate pain and dilaudid for severe pain.  Continue Neurontin  Continue Flexeril  NSG consultation noted and they recommend L2-L3 transforaminal steroid injection.  Patient last dose of Xarelto on 9/25 at 5:30 so unable to to perform procedure as this is less than 48 hours. Plan for outpatient epidural injection.  Plan for PT for home safety and for pain control     ## Atrial fibrillation  ## Chronic anticoagulation  Last Xarelto dose was on 9/25/24  Hold Xaretlo, plan for outpatient steroid injection.  Continue diltiazem     ## Chronic left kidney hydronephrosis  ## CKD stage III  Patient has known chronic left uteropelvic junction obstruction with hydronephrosis with atrophy of the kidney   Her Cr is at baseline     ## Hyperlipidemia  ## Hypertension  Continue Lipitor     ## Hx of RA  ## Hx of Scleroderma     Diet:  Regular  DVT Prophylaxis: Pneumatic Compression Devices  Barragan Catheter: Not present  Lines: None     Cardiac Monitoring: None  Code Status:  Full           Clinically Significant Risk Factors Present on Admission     # Drug Induced Coagulation Defect: home medication list includes an anticoagulant medication    # Hypertension: Noted on problem list         Disposition Plan  -- plan for discharge home  -- plan for outpatient transformainal injection L2-L3    Disposition:     Interval History   -- no acute overnight events  -- severe pain when ambulating, ok when sitting and with bedrest  -- discussed with radiology    -Data reviewed today: I reviewed all new labs and imaging over the last 24 hours. I personally reviewed no images or  "EKG's today.    Physical Exam    , Blood pressure (!) 154/67, pulse 82, temperature 98.3  F (36.8  C), temperature source Oral, resp. rate 18, height 1.6 m (5' 3\"), weight 59.2 kg (130 lb 10 oz), SpO2 98%.  Vitals:    09/26/24 2254 09/27/24 0608   Weight: 59.3 kg (130 lb 12.8 oz) 59.2 kg (130 lb 10 oz)     Vital Signs with Ranges  Temp:  [97.9  F (36.6  C)-98.4  F (36.9  C)] 98.3  F (36.8  C)  Pulse:  [70-93] 82  Resp:  [12-18] 18  BP: (123-165)/(65-83) 154/67  SpO2:  [97 %-100 %] 98 %  I/O's Last 24 hours  No intake/output data recorded.    Constitutional: Awake, bit confused, cooperative, no apparent distress  Respiratory: Clear to auscultation bilaterally, no crackles or wheezing  Cardiovascular: Regular rate and rhythm, normal S1 and S2, and no murmur noted  GI: Normal bowel sounds, soft, non-distended, non-tender  Skin/Integumen: No rashes, no cyanosis, no edema  Other:      Medications   All medications were reviewed.  Current Facility-Administered Medications   Medication Dose Route Frequency Provider Last Rate Last Admin     Current Facility-Administered Medications   Medication Dose Route Frequency Provider Last Rate Last Admin    atorvastatin (LIPITOR) tablet 20 mg  20 mg Oral Daily Aleksandr Jones MD   20 mg at 09/27/24 0809    diltiazem ER COATED BEADS (CARDIZEM CD/CARTIA XT) 24 hr capsule 240 mg  240 mg Oral Daily Aleksandr Jones MD   240 mg at 09/27/24 0809    gabapentin (NEURONTIN) capsule 100 mg  100 mg Oral Daily Aleksandr Jones MD   100 mg at 09/27/24 0809    multivitamin  with lutein (OCUVITE WITH LUTEIN) per capsule 1 capsule  1 capsule Oral BID Aleksandr Jones MD        pantoprazole (PROTONIX) EC tablet 40 mg  40 mg Oral BID AC Aleksandr Jones MD   40 mg at 09/27/24 0647    senna-docusate (SENOKOT-S/PERICOLACE) 8.6-50 MG per tablet 1 tablet  1 tablet Oral BID Aleksandr Jones MD   1 tablet at 09/27/24 0809    Or    senna-docusate (SENOKOT-S/PERICOLACE) 8.6-50 MG per tablet " 2 tablet  2 tablet Oral BID Aleksandr Jones MD   2 tablet at 09/27/24 0003    sodium chloride (PF) 0.9% PF flush 3 mL  3 mL Intracatheter Q8H Aleksandr Jones MD   3 mL at 09/27/24 0812        Data   Recent Labs   Lab 09/27/24  0609 09/26/24  1336   WBC 7.3 6.7   HGB 12.4 12.6    100    202    140   POTASSIUM 4.4 4.5   CHLORIDE 104 105   CO2 30* 26   BUN 20.4 24.6*   CR 0.89 1.03*   ANIONGAP 7 9   ARABELLA 9.9 10.1   * 104*   ALBUMIN  --  3.9   PROTTOTAL  --  6.9   BILITOTAL  --  0.5   ALKPHOS  --  112   ALT  --  16   AST  --  19       Recent Results (from the past 24 hour(s))   CT Pelvis Bone wo Contrast    Narrative    EXAM: CT PELVIS BONE WO CONTRAST  DATE/TIME: 9/26/2024 2:18 PM    INDICATION: Iliac wing and back pain and tenderness. Rule out  fracture.  COMPARISON: 7/26/2024  TECHNIQUE: Noncontrast. Axial, sagittal and coronal thin-section  reconstruction. Dose reduction techniques were used.     FINDINGS:     BONES:  Severe diffuse osteopenia which limits evaluation for subtle osseous  lesions or nondisplaced fractures. Within this limitation, no fracture  is seen.    Moderate degenerative changes of the sacroiliac joints, left greater  than right with joint space narrowing and subchondral sclerosis.  Multilevel degenerative changes of the lumbar spine with several  levels of severe facet arthropathy. Mild joint space narrowing  superiorly in both hips.    No aggressive appearing osseous lesions.    SOFT TISSUES:  No hip effusion. Mild diffuse subcutaneous edema. No discrete fluid  collection. Mild diffuse fatty infiltration of the pelvic musculature.  Benign-appearing soft tissue calcifications in the left gluteal  subcutaneous fat which may relate to injections. No enlarged lymph  nodes. Dense atherosclerotic vascular calcifications.    No acute findings within the visualized abdomen or pelvis. Colonic  diverticulosis.      Impression    IMPRESSION:  1.  Within the limitations of  osseous demineralization, no fracture is  seen. If there is persistent clinical concern for occult fracture, MRI  is more sensitive.  2.  Mild hip degenerative change.  3.  Moderate degenerative changes of the sacroiliac joints, left  greater than right.    Lamberto Elmore MD         SYSTEM ID:  [id number]   CT Lumbar Spine w/o Contrast    Narrative    CT LUMBAR SPINE WITHOUT CONTRAST  9/26/2024 2:18 PM     HISTORY: back pain, eval fracture or signs of radiculopathy      TECHNIQUE: Axial images of the lumbar spine were obtained without  intravenous contrast. Multiplanar reformations were performed.   Radiation dose for this scan was reduced using automated exposure  control, adjustment of the mA and/or kV according to patient size, or  iterative reconstruction technique.     COMPARISON: None.     FINDINGS:      No acute fracture.    Moderate canal stenosis at L2-3. Severe left foraminal narrowing at  L2-3.    Severe canal stenosis at L3-4 and L4-5.    Visualized paraspinous tissues: Large left renal cyst.      Impression    IMPRESSION:  No acute fracture.     KILO ANDRADE MD         SYSTEM ID:  [id number]   Lumbar spine MRI w/o contrast    Narrative    EXAM: MR LUMBAR SPINE W/O CONTRAST  LOCATION: [facility]  DATE: 9/26/2024    INDICATION: R back pain with radiation down R thigh  COMPARISON: CT lumbar spine 9/26/2024  TECHNIQUE: Routine Lumbar Spine MRI without IV contrast.    FINDINGS:   Nomenclature is based on 5 lumbar type vertebral bodies.  Normal vertebral body heights.  No concerning bone marrow lesions.  Normal distal spinal cord and cauda equina with conus medullaris at L1-L2.  Unremarkable visualized bony pelvis.      Multilevel degenerative disc disease. Mild degenerative type I Modic changes L2-3 and to lesser extent L1-2.    Multilevel facet arthropathy worse within the lower lumbar spine. Irregularity spinous processes suggest Baastrup's disease. L2-3 pronounced interspinous  bursitis. L1-2 mild interspinous bursitis. Bilateral SI degenerative joint disease.    L3-4 mild degenerative grade 1 anterolisthesis measuring 5 mm.  L4-5 degenerative grade 1 anterolisthesis measuring 1 mm.    T11-T12: Mild bulge. No significant thecal sac stenosis. No significant neural foraminal stenosis.    T12-L1: No significant bulge or posterior disc protrusion. No significant thecal sac stenosis. No significant neural foraminal stenosis.      L1-L2: Slight bulge. Mild facet hypertrophy. No significant thecal sac stenosis. Mild bilateral foraminal stenosis.    L2-L3: Bulge with bilateral foraminal extension. Superimposed right foraminal protrusion. Facet hypertrophy. Severe thecal sac stenosis measuring 6 mm AP dimension. Mild to moderate left foraminal stenosis. Severe right foraminal stenosis with   compression exiting right L2 nerve root.    L3-L4: Grade 1 anterolisthesis described above. Bulge. Hypertrophic facet arthropathy. Severe thecal sac stenosis measuring 6 mm AP dimension. Mild to moderate left foraminal stenosis. Mild right foraminal stenosis.    L4-L5: Slight bulge. Hypertrophic facet arthropathy. Severe thecal sac stenosis measuring 6 cm AP dimension. Mild bilateral foraminal stenosis.    L5-S1: Mild bulge. Facet hypertrophy. No significant thecal sac stenosis. No significant left foraminal stenosis. Mild right foraminal stenosis.    EXTRASPINAL FINDINGS:  Left kidney severe hydronephrosis with likely superimposed left foraminal stenosis redemonstrated from CT abdomen pelvis 4/10/2024. Right kidney demonstrate several small renal cysts. Small left adrenal nodule similar compared to CT abdomen pelvis   4/10/2024.      Impression     IMPRESSION:  1.  Multilevel spondylosis described above. Modic changes described above.    2.  L2-3, L3-4, L4-5 severe thecal sac stenosis.    3.  L2-L3: Bulge with bilateral foraminal extension. Superimposed right foraminal protrusion. Severe right foraminal  stenosis with compression exiting right L2 nerve root.                 Aleksandr Jones MD  Text Page  (7am to 6pm)

## 2024-09-27 NOTE — PLAN OF CARE
PRIMARY Concern: R hip pain  SAFETY RISK Concerns (fall risk, behaviors, etc.): Fall risk       Aggression Tool Color: Green  Isolation/Type: NA  Tests/Procedures for NEXT shift:   Consults? (Pending/following, signed-off?) PT, neuro surg follow up outpatient for injection   Where is patient from? (Home, TCU, etc.): Home  Other Important info for NEXT shift: Injection appointment scheduled at [facility] 10/2.   Anticipated DC date & active delays: 9/28-- PT rec home with assist or TCU  _____________________________________________________________________________  SUMMARY NOTE:  Orientation/Cognitive: A/Ox4  Observation Goals (Met/ Not Met): Not met   Mobility Level/Assist Equipment: Ax1 GB/W  Antibiotics & Plan (IV/po, length of tx left): NA  Pain Management: PO dilaudid x2  Tele/VS/O2: VSS RA  ABNL Lab/BG: WDL  Diet: Reg  Bowel/Bladder: Cont  Skin Concerns: Intact  Drains/Devices: PIV SL  Patient Stated Goal for Today: Pain control       PRIMARY DIAGNOSIS: ACUTE PAIN  OUTPATIENT/OBSERVATION GOALS TO BE MET BEFORE DISCHARGE:  1. Pain Status: Improved-controlled with oral pain medications.     2. Return to near baseline physical activity: Yes     3. Cleared for discharge by consultants (if involved): Yes        Discharge Planner Nurse  Safe discharge environment identified: Yes  Barriers to discharge: Yes       Entered by: Mame Ziegler RN 09/27/2024 6:02 PM        Please review provider order for any additional goals.   Nurse to notify provider when observation goals have been met and patient is ready for discharge.

## 2024-09-27 NOTE — PLAN OF CARE
Goal Outcome Evaluation:    Summary: Low back pain with L2-L3 interspinous bursitis & L2-L3 severe right foraminal stenosis with compression of exiting L2 nerve root.   Orientation: A&O x4  Observation Goals (met & not met): not met  Activity Level: Ax1 GBW  Fall Risk: yes  Behavior & Aggression Tool Color: green  Pain Management: C/o of lower back pain; PRN dilaudid given 1x  ABNL VS/O2: Vitals stable ex elevated BP. On room air  ABNL Lab/BG: see results  Diet: Regular   Bowel/Bladder: Continent   Drains/Devices: PIV SL  Anticipated DC date: pending  Other: recent foot fracture

## 2024-09-27 NOTE — PROGRESS NOTES
PRIMARY DIAGNOSIS: ACUTE PAIN  OUTPATIENT/OBSERVATION GOALS TO BE MET BEFORE DISCHARGE:  1. Pain Status: Improved-controlled with oral pain medications.    2. Return to near baseline physical activity: Yes    3. Cleared for discharge by consultants (if involved): Yes-- still needs PT eval    Discharge Planner Nurse   Safe discharge environment identified: Yes  Barriers to discharge: Yes       Entered by: Mame Ziegler RN 09/27/2024 1:56 PM     Please review provider order for any additional goals.   Nurse to notify provider when observation goals have been met and patient is ready for discharge.

## 2024-09-27 NOTE — DOWNTIME EVENT NOTE
The EMR was down for 1 hours on 9/27/2024.    Writer was responsible for completing the paper charting during this time period.     The following information was re-entered into the system by Mame Ziegler RN: MAR-- Protonix 40mg PO @1631.    The following information will remain in the paper chart: Medication given    Mame Ziegler RN  9/27/2024

## 2024-09-27 NOTE — H&P
[facility]    History and Physical - Hospitalist Service       Date of Admission:  9/26/2024    Assessment & Plan   Marcia Gary is a 88 year old female who is anticoagulated on Xarelto with a history of atrial fibrillation, hypertension, rheumatoid arthritis who presents with right flank pain. Patient notes that she has had right flank pain for the past week but worse the past 3 days. Patient describes that her right flank pain occasionally radiates to her right leg. Patient's daughter-in-law notes that the patient went to [facility] to monitor her recent foot fracture and also had a negative right hip x-ray. [facility] noted that the patient might be having a right-sided sciatica nerve pinch due to her right left lower extremity radiation. Patient states that her pain is not worse with deep respiration. She states that she addressing her pain with flexeril and gabapentin with only minimal relief. She denies taking Tylenol for pain management. She endorses a history of CKD. She denies nausea, vomiting, diarrhea, fever, chills, coughs. She denies dysuria. She denies bowel or bladder dysfunction, RLE weakness.      In the ER she is afebrile, stable vital signs.  BMP is normal, Cr 1.03, , normal CBC.  UA normal.  CT scan of the lumbar spine showed no acute fracture moderate canal stenosis at L2-L3 and severe left foraminal narrowing at L2-L3.  There is severe canal stenosis at L3-L4 and L4-L5.  CT of the pelvis showed no acute fracture, if concern for occult fracture MRI was recommended.  The MRI revealed L2-L3 bulge with bilateral foraminal extension superimposed right foraminal protrusion with severe thecal sac stenosis measuring 6 mm with severe right foraminal stenosis with compression exiting right L2 nerve root.  There is also L2-L3 pronounced interspinous bursitis.    ## Low back pain with  ## L2-L3 interspinous bursitis  ## L2-L3 severe right  foraminal stenosis with compression of exiting L2 nerve root.   Patient will be admitted under observation status with pain control with scheduled Tylenol Neurontin physical therapy and neurosurgical evaluation. No red flag symptoms.  Patient may benefit from an initial epidural steroid injection but will defer to neurosurgery.  Admit under observation status  Schedule tylenol, add ultram for moderate pain and dilaudid for severe pain.  Continue Neurontin  Continue Flexeril  NSG consultation noted and they recommend L2-L3 transforaminal steroid injection.  Patient last dose of Xarelto on 9/25 at 5:30 so unable to to perform procedure as this is less than 48 hours. Plan for outpatient epidural injection.  Plan for PT for home safety and for pain control    ## Atrial fibrillation  ## Chronic anticoagulation  Last Xarelto dose was on 9/25/24  Hold Xaretlo, plan for outpatient steroid injection.  Continue diltiazem    ## Chronic left kidney hydronephrosis  ## CKD stage III  Patient has known chronic left uteropelvic junction obstruction with hydronephrosis with atrophy of the kidney   Her Cr is at baseline    ## Hyperlipidemia  ## Hypertension  Continue Lipitor    ## Hx of RA  ## Hx of Scleroderma    Diet:  Regular  DVT Prophylaxis: Pneumatic Compression Devices  [place] Catheter: Not present  Lines: None     Cardiac Monitoring: None  Code Status:  Full    Clinically Significant Risk Factors Present on Admission     # Drug Induced Coagulation Defect: home medication list includes an anticoagulant medication    # Hypertension: Noted on problem list                    Disposition Plan     Medically Ready for Discharge: Anticipated Tomorrow        Aleksandr Jones MD  Hospitalist Service  [facility]  Securely message with [employer] (more info)  Text page via [employer] Paging/Directory     ______________________________________________________________________    Chief Complaint   Abdominal pain / Flank  pain    History is obtained from the patient, electronic health record, and emergency department physician    History of Present Illness   Marcia Gary is a 88 year old female who is anticoagulated on Xarelto with a history of atrial fibrillation, hypertension, rheumatoid arthritis who presents with right flank pain. Patient notes that she has had right flank pain for the past week but worse the past 3 days. Patient describes that her right flank pain occasionally radiates to her right leg. Patient's daughter-in-law notes that the patient went to [facility] to monitor her recent foot fracture and also had a negative right hip x-ray. [facility] noted that the patient might be having a right-sided sciatica nerve pinch due to her right left lower extremity radiation. Patient states that her pain is not worse with deep respiration. She states that she addressing her pain with flexeril and gabapentin with only minimal relief. She denies taking Tylenol for pain management. She endorses a history of CKD. She denies nausea, vomiting, diarrhea, fever, chills, coughs. She denies dysuria. She denies bowel or bladder dysfunction, RLE weakness.      In the ER she is afebrile, stable vital signs.  BMP is normal, Cr 1.03, , normal CBC.  UA normal.  CT scan of the lumbar spine showed no acute fracture moderate canal stenosis at L2-L3 and severe left foraminal narrowing at L2-L3.  There is severe canal stenosis at L3-L4 and L4-L5.  CT of the pelvis showed no acute fracture, if concern for occult fracture MRI was recommended.  The MRI revealed L2-L3 bulge with bilateral foraminal extension superimposed right foraminal protrusion with severe thecal sac stenosis measuring 6 mm with severe right foraminal stenosis with compression exiting right L2 nerve root.  There is also L2-L3 pronounced interspinous bursitis.    Past Medical History    Past Medical History:   Diagnosis Date    Atrial fibrillation  (H)     Chronic kidney disease     Hypertension     Mitral regurgitation 2010 2010 Echo - mild-mod MR    Rheumatic fever     Rheumatoid arthritis (H)     Scleroderma (H)     Sleep apnea        Past Surgical History   Past Surgical History:   Procedure Laterality Date    CORONARY ANGIOGRAPHY ADULT ORDER  7/2008    normal coronary arteries, mod MR       Prior to Admission Medications   Prior to Admission Medications   Prescriptions Last Dose Informant Patient Reported? Taking?   Cholecalciferol (VITAMIN D3 PO)  Self Yes No   Sig: Take 1,000 Units by mouth daily   Multiple Vitamins-Minerals (MULTI COMPLETE PO)  Self Yes No   Sig: Take 1 tablet by mouth daily   Multiple Vitamins-Minerals (PRESERVISION AREDS 2 PO)  Self Yes No   Sig: Take 1 capsule by mouth 2 times daily   Omeprazole 20 MG TBDD 9/26/2024 Self Yes Yes   Sig: Take 20 mg by mouth 2 times daily (before meals)   acetaminophen (TYLENOL) 500 MG tablet   No No   Sig: Take 2 tablets (1,000 mg) by mouth every 6 hours as needed   atorvastatin (LIPITOR) 20 MG tablet 9/26/2024 Self No Yes   Sig: Take 1 tablet (20 mg) by mouth daily   bisacodyl (DULCOLAX) 10 MG suppository  Self No No   Sig: Place 1 suppository (10 mg) rectally daily as needed for constipation   calcium citrate-vitamin D (CITRACAL) 315-200 MG-UNIT TABS  Self Yes No   Sig: Take 1 tablet by mouth daily   cyclobenzaprine (FLEXERIL) 10 MG tablet 9/26/2024  Yes Yes   Sig: Take 10 mg by mouth.   diltiazem (CARTIA XT) 240 MG 24 hr capsule 9/26/2024 Self No Yes   Sig: Take 1 capsule (240 mg) by mouth daily   docusate sodium (COLACE) 100 MG capsule  Self No No   Sig: Take 1 capsule (100 mg) by mouth 2 times daily   ferrous sulfate (IRON) 325 (65 FE) MG tablet  Self Yes No   Sig: Take 325 mg by mouth daily (with breakfast) Once a week   rivaroxaban ANTICOAGULANT (XARELTO) 15 MG TABS tablet 9/26/2024 Self No Yes   Sig: Take 1 tablet (15 mg) by mouth daily (with dinner)      Facility-Administered Medications:  None           Physical Exam   Vital Signs: Temp: 98.4  F (36.9  C) Temp src: Oral BP: 131/72 Pulse: 70   Resp: 16 SpO2: 100 % O2 Device: None (Room air)    Weight: 0 lbs 0 oz    General Appearance: NAD  RESP: CTA  Cardiovascular: RRR  GI: Soft, NT, ND  Skin: Warm and dry  Other: Moves all 4 ext, + interspinous diffuse tenderness, CN intact     Medical Decision Making       60 MINUTES SPENT BY ME on the date of service doing chart review, history, exam, documentation & further activities per the note.      Data     I have personally reviewed the following data over the past 24 hrs:    6.7  \   12.6   / 202     140 105 24.6 (H) /  104 (H)   4.5 26 1.03 (H) \     ALT: 16 AST: 19 AP: 112 TBILI: 0.5   ALB: 3.9 TOT PROTEIN: 6.9 LIPASE: N/A       Imaging results reviewed over the past 24 hrs:   Recent Results (from the past 24 hour(s))   CT Pelvis Bone wo Contrast    Narrative    EXAM: CT PELVIS BONE WO CONTRAST  DATE/TIME: 9/26/2024 2:18 PM    INDICATION: Iliac wing and back pain and tenderness. Rule out  fracture.  COMPARISON: 7/26/2024  TECHNIQUE: Noncontrast. Axial, sagittal and coronal thin-section  reconstruction. Dose reduction techniques were used.     FINDINGS:     BONES:  Severe diffuse osteopenia which limits evaluation for subtle osseous  lesions or nondisplaced fractures. Within this limitation, no fracture  is seen.    Moderate degenerative changes of the sacroiliac joints, left greater  than right with joint space narrowing and subchondral sclerosis.  Multilevel degenerative changes of the lumbar spine with several  levels of severe facet arthropathy. Mild joint space narrowing  superiorly in both hips.    No aggressive appearing osseous lesions.    SOFT TISSUES:  No hip effusion. Mild diffuse subcutaneous edema. No discrete fluid  collection. Mild diffuse fatty infiltration of the pelvic musculature.  Benign-appearing soft tissue calcifications in the left gluteal  subcutaneous fat which may relate to  injections. No enlarged lymph  nodes. Dense atherosclerotic vascular calcifications.    No acute findings within the visualized abdomen or pelvis. Colonic  diverticulosis.      Impression    IMPRESSION:  1.  Within the limitations of osseous demineralization, no fracture is  seen. If there is persistent clinical concern for occult fracture, MRI  is more sensitive.  2.  Mild hip degenerative change.  3.  Moderate degenerative changes of the sacroiliac joints, left  greater than right.    Lamberto Elmore MD         SYSTEM ID:  [id number]   CT Lumbar Spine w/o Contrast    Narrative    CT LUMBAR SPINE WITHOUT CONTRAST  9/26/2024 2:18 PM     HISTORY: back pain, eval fracture or signs of radiculopathy      TECHNIQUE: Axial images of the lumbar spine were obtained without  intravenous contrast. Multiplanar reformations were performed.   Radiation dose for this scan was reduced using automated exposure  control, adjustment of the mA and/or kV according to patient size, or  iterative reconstruction technique.     COMPARISON: None.     FINDINGS:      No acute fracture.    Moderate canal stenosis at L2-3. Severe left foraminal narrowing at  L2-3.    Severe canal stenosis at L3-4 and L4-5.    Visualized paraspinous tissues: Large left renal cyst.      Impression    IMPRESSION:  No acute fracture.     KILO ANDRADE MD         SYSTEM ID:  [id number]

## 2024-09-27 NOTE — PHARMACY-ADMISSION MEDICATION HISTORY
Pharmacist Admission Medication History    Admission medication history is complete. The information provided in this note is only as accurate as the sources available at the time of the update.    Information Source(s): Patient and CareEverywhere/SureScripts via in-person    Pertinent Information:     Changes made to PTA medication list:  Added: None  Deleted: None  Changed: None    Allergies reviewed with patient and updates made in EHR: no    Medication History Completed By: Akil Aliheyder, RPH 9/26/2024 7:50 PM    PTA Med List   Medication Sig Last Dose    acetaminophen (TYLENOL) 500 MG tablet Take 2 tablets (1,000 mg) by mouth every 6 hours as needed     atorvastatin (LIPITOR) 20 MG tablet Take 1 tablet (20 mg) by mouth daily 9/25/2024    cyclobenzaprine (FLEXERIL) 10 MG tablet Take 10 mg by mouth 3 times daily as needed. 9/26/2024 at am    diltiazem (CARTIA XT) 240 MG 24 hr capsule Take 1 capsule (240 mg) by mouth daily 9/25/2024    Multiple Vitamins-Minerals (PRESERVISION AREDS 2 PO) Take 1 capsule by mouth 2 times daily 9/26/2024 at am    Omeprazole 20 MG TBDD Take 20 mg by mouth 2 times daily (before meals) 9/26/2024 at am    rivaroxaban ANTICOAGULANT (XARELTO) 15 MG TABS tablet Take 1 tablet (15 mg) by mouth daily (with dinner) 9/25/2024

## 2024-09-27 NOTE — PROGRESS NOTES
[facility]  OUTPATIENT PHYSICAL THERAPY EVALUATION  PLAN OF TREATMENT FOR OUTPATIENT REHABILITATION  (COMPLETE FOR INITIAL CLAIMS ONLY)  Patient's Last Name, First Name, M.I.  YOB: 1935  Marcia Gary                        Provider's Name  [facility] Medical Record No.  [id number]                             Onset Date:  09/26/24   Start of Care Date:  09/27/24   Type:     _X_PT   ___OT   ___SLP Medical Diagnosis:  R flank pain              PT Diagnosis:  Difficulty with functional mobility. Visits from SOC:  1     See note for plan of treatment, functional goals and certification details    I CERTIFY THE NEED FOR THESE SERVICES FURNISHED UNDER        THIS PLAN OF TREATMENT AND WHILE UNDER MY CARE     (Physician co-signature of this document indicates review and certification of the therapy plan).               09/27/24 4780   Appointment Info   Signing Clinician's Name / Credentials (PT) Renee Smith DPT   Living Environment   People in Home alone   Current Living Arrangements house   Home Accessibility stairs to enter home;stairs within home   Number of Stairs, Main Entrance none   Stair Railings, Main Entrance none   Number of Stairs, Within Home, Primary greater than 10 stairs   Stair Railings, Within Home, Primary railing on left side (ascending)   Transportation Anticipated family or friend will provide;car, drives self   Living Environment Comments Pt is unsure if pt will be able to have assist upon discharge. Pt reports she could possibly have assist from daughter/son over the weekend.   Self-Care   Usual Activity Tolerance good   Current Activity Tolerance moderate   Equipment Currently Used at Home cane, straight   Fall history within last six months no   Activity/Exercise/Self-Care Comment Pt thinks she has access to a FWW.   General Information   Onset of Illness/Injury or Date of Surgery 09/26/24   Referring  Physician Aleksandr Jones MD   Patient/Family Therapy Goals Statement (PT) Pt is unsure, pt reports she wouldn't be able to manage at home alone d/t pain.   Pertinent History of Current Problem (include personal factors and/or comorbidities that impact the POC) 89 y/o female admitted with R flank pain, noted to have L2-L3 interspinous bursitis and L2-L3 severe R foraminal stenosis with compression of exiting L2 nerve root. PMH including atrial fibrillation, hypertension, rheumatoid arthritis.   Existing Precautions/Restrictions fall   General Observations Pt in supine upon arrival of therapist.   Cognition   Affect/Mental Status (Cognition) WNL   Orientation Status (Cognition) oriented x 3   Follows Commands (Cognition) WNL   Pain Assessment   Patient Currently in Pain   (R side low back and hip pain of 7-8/10)   Posture    Posture Comments Noted forward head and shoulder posture sitting EOB and standing at FWW.   Range of Motion (ROM)   ROM Comment B LEs WFL.   Strength (Manual Muscle Testing)   Strength Comments Not formally assessed, pt demonstrates at least 3/5 grossly in B LEs with functional mobility.   Bed Mobility   Comment, (Bed Mobility) Supine-sit with SBA.   Transfers   Comment, (Transfers) Sit <> stand with FWW and SBA.   Gait/Stairs (Locomotion)   Comment, (Gait/Stairs) Pt amb 10' with FWW and CGA.   Balance   Balance Comments Noted good seated and standing balance at FWW.   Clinical Impression   Criteria for Skilled Therapeutic Intervention Yes, treatment indicated   PT Diagnosis (PT) Difficulty with functional mobility.   Influenced by the following impairments Pain, Generalized weakness, Decreased activity tolerance   Functional limitations due to impairments Limited functional mobility requiring AD and assist.   Clinical Presentation (PT Evaluation Complexity) stable   Clinical Presentation Rationale Based on PMH, current presentation, and social support.   Clinical Decision Making (Complexity)  low complexity   Planned Therapy Interventions (PT) balance training;bed mobility training;gait training;ROM (range of motion);stair training;strengthening;transfer training   Risk & Benefits of therapy have been explained patient   PT Total Evaluation Time   PT Eval, Low Complexity Minutes (06262) 10   Therapy Certification   Start of care date 09/27/24   Certification date from 09/27/24   Certification date to 09/30/24   Medical Diagnosis R flank pain   Physical Therapy Goals   PT Frequency 6x/week   PT Predicted Duration/Target Date for Goal Attainment 09/30/24   PT Goals Bed Mobility;Transfers;Gait;Stairs   PT: Bed Mobility Modified independent;Supine to/from sit   PT: Transfers Modified independent;Sit to/from stand;Assistive device   PT: Gait Modified independent;Rolling walker;100 feet   PT: Stairs Modified independent;6 stairs;Rail on left   Interventions   Interventions Quick Adds Gait Training;Therapeutic Activity   Therapeutic Activity   Therapeutic Activities: dynamic activities to improve functional performance Minutes (48264) 20   Symptoms Noted During/After Treatment Fatigue;Increased pain   Treatment Detail/Skilled Intervention PT: Pt in supine upon arrival of therapist, agreeable to session. Pt performed supine-sit with SBA, cues provided for log roll technique. Sit <> stand from EOB and toilet with FWW and SBA, cues provided for hand placement and upright posture upon standing. Pt amb 15' x 2 with FWW and CGA, noted short shuffling gait pattern. Pt sitting up in chair at end of session, chair alarm on and needs within reach. Pt is limited by pain.   PT Discharge Planning   PT Plan Progress gait, stairs as able   PT Discharge Recommendation (DC Rec) Transitional Care Facility;home with assist;home with home care physical therapy;Leaving home requires significant assistance;Leaving home requires significant taxing effort   PT Rationale for DC Rec Pt is below baseline, currently requiring assist of 1  with use of FWW and unable to ambulate >15'. If pt's family is able to provide assist of 1, pt would be able to return home with Home PT. Pt is unsure if she will be able to have assist upon discharge, if pt's family is unable to provide assist pt will require TCU.   PT Brief overview of current status Assist of 1 with FWW. Goals of therapy will be to address safe mobility and make recs for d/c to next level of care. Pt and RN will continue to follow all falls risk precautions as documented by RN staff while hospitalized   Total Session Time   Timed Code Treatment Minutes 20   Total Session Time (sum of timed and untimed services) 30

## 2024-09-27 NOTE — CONSULTS
IR consult for L2L3 lumbar radiculopathy, low back pain asking for an injection done in [facility] is not part of the IR consult model.     Discussed with XRay at *[phone] who have the patient's MR number. They will contact Alondra Gutierrez who will do the injection to review and have Dr Jones's contact information to have him place the correct order.     Consult completed.     Total time: 15 minutes     Thanks, Alisa [facility] CNP ([phone]) (phone [phone])

## 2024-09-27 NOTE — CONSULTS
Neurosurgery Consult    HPI    Mr. Gary is a an 88-year-old female who was admitted on September 26, 2024, she had a week of right flank pain, worse over the past 3 days, she went to see her primary care, was given Flexeril gabapentin and prednisone, she stopped taking the prednisone because it made her feel loopy.  She reports her symptoms have been quite severe, and describes them radiating from her low back around her hip and into her right groin.  She denies bowel or bladder symptoms.  She denies pain going down her leg further than that.  She states the symptoms are worse with standing, but also bothersome when changing positions in bed.  Sometimes she is able to find a certain position in bed that is comfortable.  She was trying some home physical therapy exercises without relief, in fact exacerbated her symptoms.        Medical history  Patient is anticoagulated on Xarelto for history of atrial fibrillation, her last dose was Wednesday evening.    Hypertension   rheumatoid arthritis      B/P: 165/83, T: 98.3, P: 93, R: 18       Exam    Alert, oriented, no acute distress, able to independently sit up in bed, and complete physical exam.    Bilateral lower extremities with 5-5 strength with hip flexion extension ankle dorsiflexion plantarflexion  Positive straight leg raise on the right elicits pain in the back radiating across her hip.    Nontender to palpation in the lumbar spine    No pain with internal extra rotation in the right hip.    Reflexes 1+ bilateral patella, absent ankles  Negative ankle clonus      Imaging    Lumbar MRI demonstrated     IMPRESSION:  1.  Multilevel spondylosis described above. Modic changes described above.     2.  L2-3, L3-4, L4-5 severe thecal sac stenosis.     3.  L2-L3: Bulge with bilateral foraminal extension. Superimposed right foraminal protrusion. Severe right foraminal stenosis with compression exiting right L2 nerve root.    Assessment    Right low back pain radiating  around her hip into her groin  Right L2-3 foraminal impingement of the right L2 nerve    Plan:      We would recommend a a right L2-3 transforaminal epidural steroid injection.  We have asked interventional radiology whether they would be able to perform this as an inpatient.    They will assess whether she has been off of her Xarelto long enough for them to perform the injection.    Discussed with the hospitalist as well.    Also recommend physical therapy to evaluate the patient as well.    If the injection is not helpful with resolving her pain, she could certainly follow-up with our clinic for evaluation as an outpatient to discuss surgical decompression on the right at L2-3 if the symptoms remain significant and refractory to conservative therapies.    Addendum 1:30 PM    Patient unable to have inpatient injection due to recent anticoagulation dosing.  Plan will be for outpatient injection, and outpatient neurosurgery follow-up to follow that.  Our clinic will call patient to make a follow-up visit after her injection.

## 2024-09-28 ENCOUNTER — APPOINTMENT (OUTPATIENT)
Dept: PHYSICAL THERAPY | Facility: CLINIC | Age: 89
End: 2024-09-28
Payer: MEDICARE

## 2024-09-28 PROCEDURE — 250N000013 HC RX MED GY IP 250 OP 250 PS 637: Performed by: INTERNAL MEDICINE

## 2024-09-28 PROCEDURE — G0378 HOSPITAL OBSERVATION PER HR: HCPCS

## 2024-09-28 PROCEDURE — 99232 SBSQ HOSP IP/OBS MODERATE 35: CPT | Performed by: PHYSICIAN ASSISTANT

## 2024-09-28 PROCEDURE — 97116 GAIT TRAINING THERAPY: CPT | Mod: GP,CQ

## 2024-09-28 RX ADMIN — PANTOPRAZOLE SODIUM 40 MG: 40 TABLET, DELAYED RELEASE ORAL at 15:29

## 2024-09-28 RX ADMIN — HYDROMORPHONE HYDROCHLORIDE 2 MG: 2 TABLET ORAL at 21:34

## 2024-09-28 RX ADMIN — DILTIAZEM HYDROCHLORIDE 240 MG: 240 CAPSULE, COATED, EXTENDED RELEASE ORAL at 09:13

## 2024-09-28 RX ADMIN — HYDROMORPHONE HYDROCHLORIDE 2 MG: 2 TABLET ORAL at 09:14

## 2024-09-28 RX ADMIN — SENNOSIDES AND DOCUSATE SODIUM 1 TABLET: 50; 8.6 TABLET ORAL at 21:35

## 2024-09-28 RX ADMIN — PANTOPRAZOLE SODIUM 40 MG: 40 TABLET, DELAYED RELEASE ORAL at 09:14

## 2024-09-28 RX ADMIN — Medication 1 CAPSULE: at 21:34

## 2024-09-28 RX ADMIN — GABAPENTIN 100 MG: 100 CAPSULE ORAL at 09:13

## 2024-09-28 RX ADMIN — ATORVASTATIN CALCIUM 20 MG: 20 TABLET, FILM COATED ORAL at 09:13

## 2024-09-28 RX ADMIN — HYDROMORPHONE HYDROCHLORIDE 2 MG: 2 TABLET ORAL at 15:30

## 2024-09-28 RX ADMIN — SENNOSIDES AND DOCUSATE SODIUM 1 TABLET: 50; 8.6 TABLET ORAL at 09:17

## 2024-09-28 ASSESSMENT — ACTIVITIES OF DAILY LIVING (ADL)
ADLS_ACUITY_SCORE: 39
DEPENDENT_IADLS:: INDEPENDENT
ADLS_ACUITY_SCORE: 39

## 2024-09-28 NOTE — CONSULTS
Care Management Initial Consult    General Information  Assessment completed with: Patient (and pt's daughter),    Type of CM/SW Visit: Initial Assessment    Primary Care Provider verified and updated as needed:     Readmission within the last 30 days: unable to assess      Reason for Consult: discharge planning  Advance Care Planning:            Communication Assessment  Patient's communication style: spoken language (English or Bilingual)             Cognitive  Cognitive/Neuro/Behavioral: WDL  Level of Consciousness: alert  Arousal Level: opens eyes spontaneously  Orientation: oriented x 4  Mood/Behavior: calm, cooperative  Best Language: 0 - No aphasia  Speech: clear, spontaneous    Living Environment:   People in home: alone     Current living Arrangements: house      Able to return to prior arrangements: no       Family/Social Support:  Care provided by: self  Provides care for: no one  Marital Status:   Support system: Children          Description of Support System:           Current Resources:   Patient receiving home care services: No        Community Resources: None  Equipment currently used at home:  (cane as needed)  Supplies currently used at home:      Employment/Financial:  Employment Status: retired        Financial Concerns:             Does the patient's insurance plan have a 3 day qualifying hospital stay waiver?  No    Lifestyle & Psychosocial Needs:  Social Determinants of Health     Food Insecurity: Not on file   Depression: Not on file   Housing Stability: Not on file   Tobacco Use: Low Risk  (8/9/2024)    Received from [facility]    Patient History     Smoking Tobacco Use: Never     Smokeless Tobacco Use: Never     Passive Exposure: Not on file   Financial Resource Strain: Not on file   Alcohol Use: Not on file   Transportation Needs: Not on file   Physical Activity: Not on file   Interpersonal Safety: Not on file   Stress: Not on file   Social Connections: Not on file    Health Literacy: Not on file       Functional Status:  Prior to admission patient needed assistance:   Dependent ADLs:: Independent  Dependent IADLs:: Independent       Mental Health Status:          Chemical Dependency Status:                Values/Beliefs:  Spiritual, Cultural Beliefs, Evangelical Practices, Values that affect care:                 Discussed  Partnership in Safe Discharge Planning  document with patient/family: No; writer did discuss with pt and family that if preferred facility choices are unable to accept pt then care management will have to continue to send more referrals to obtain safe discharge plan.    Additional Information:  Pt is a 88 year old female who was admitted to the hospital with concerns of right flank pain per  note on 9/26.    Writer attended morning charge report. Charge states that PT is recommending TCU.    Writer met with pt at bedside. Pt's daughter Savi is present at bedside. Introduced self and role. Pt states that she was living at home alone prior to hospitalizations. She states that it is a home with a basement where her washer and dryer are downstairs. Pt states that prior to recent issues she was navigating these steps to get downstairs and complete laundry. Pt states that she was independent with ADL's including bathing, grooming, meals, medications and even driving. Pt states that she did occasionally use a cane as needed. Discussed PT recommendations. Pt states that she and her daughter Savi spoke with PA doctor and that they were also recommending TCU. Pt and Savi state they are in agreement to TCU. Pt and Savi asked about insurance coverage. Writer informed them that typical medicare guidelines would cause pt to private pay for TCU as she is observation status. Writer informed pt and Savi that since she is ACO reach program she should have coverage though under contract TCU's. Writer provided pt and Savi the list. Savi then called her brother.  Pt , Savi and pt's son all discussed options. Savi then ended phone call with pt's son. Pt and Savi discussed briefly again and [facility]. Pt is asking about insurance again. Writer informed pt that through ACO pt should have coverage if attending one of the contract facilities but that since writer is not pt's insurance company writer does have to give disclaimer that writer cannot guarantee what insurance coverage will be or cover for TCU stay. Pt and Savi state understanding. Discussed possible transport as well. Writer informed pt that if she were accepted at [facility] they could borrow hospital wheelchair and have family wheelchair pt over [employer] if pt and family both felt comfortable doing so. Writer informed pt and Savi that otherwise wheelchair transport can be set up through transportation company but this is not covered under insurance typically and is a private pay cost of $90 per base and $6 per mile. Pt and Savi state understanding. Preference at this time is [facility] with family to wheelchair pt over skyway. Pt and savi state no further questions or concerns at this time.     Addendum: Writer is informed by [facility] that they can accept pt tomorrow. She asked that writer call the weekend phone number tomorrow if pt is accepting of facility. Writer asked if any concerns regarding pt's insurance. Brea states there are no questions regarding pt's insurance coverage as pt should have coverage under ACO reach program.       Next Steps: Awaiting medically stability and accepting facility.     Bibi Elizalde, BSW  Social Work  [facility]

## 2024-09-28 NOTE — PROGRESS NOTES
PRIMARY DIAGNOSIS: ACUTE PAIN  OUTPATIENT/OBSERVATION GOALS TO BE MET BEFORE DISCHARGE:  1. Pain Status: Improved-controlled with oral pain medications.    2. Return to near baseline physical activity: No    3. Cleared for discharge by consultants (if involved): No    Discharge Planner Nurse   Safe discharge environment identified: No  Barriers to discharge: Yes       Entered by: Mame Ziegler RN 09/28/2024 11:49 AM     Please review provider order for any additional goals.   Nurse to notify provider when observation goals have been met and patient is ready for discharge.

## 2024-09-28 NOTE — PLAN OF CARE
PRIMARY Concern: R hip pain  SAFETY RISK Concerns (fall risk, behaviors, etc.): Fall risk       Aggression Tool Color: Green  Isolation/Type: NA  Tests/Procedures for NEXT shift:   Consults? (Pending/following, signed-off?) PT, neuro surg follow up outpatient for injection   Where is patient from? (Home, TCU, etc.): Home  Other Important info for NEXT shift: Injection appointment scheduled at [facility] 10/2 @9202.  Anticipated DC date & active delays: 9/28-- [facility]  _____________________________________________________________________________  SUMMARY NOTE:  Orientation/Cognitive: A/Ox4  Observation Goals (Met/ Not Met): Not met   Mobility Level/Assist Equipment: Ax1 GB/W  Antibiotics & Plan (IV/po, length of tx left): NA  Pain Management: PO dilaudid x2  Tele/VS/O2: VSS RA  ABNL Lab/BG: WDL  Diet: Reg  Bowel/Bladder: Cont  Skin Concerns: Intact  Drains/Devices: PIV SL  Patient Stated Goal for Today: Pain control     PRIMARY DIAGNOSIS: ACUTE PAIN  OUTPATIENT/OBSERVATION GOALS TO BE MET BEFORE DISCHARGE:  1. Pain Status: Improved-controlled with oral pain medications.    2. Return to near baseline physical activity: No    3. Cleared for discharge by consultants (if involved): No    Discharge Planner Nurse   Safe discharge environment identified: No  Barriers to discharge: Yes       Entered by: Mame Ziegler RN 09/28/2024 6:17 PM     Please review provider order for any additional goals.   Nurse to notify provider when observation goals have been met and patient is ready for discharge.

## 2024-09-28 NOTE — PLAN OF CARE
Goal Outcome Evaluation:                          PRIMARY Concern: R hip pain  SAFETY RISK Concerns (fall risk, behaviors, etc.): Fall risk       Aggression Tool Color: Green  Isolation/Type: NA  Tests/Procedures for NEXT shift:   Consults? (Pending/following, signed-off?) PT, neuro surg follow up outpatient for injection   Where is patient from? (Home, TCU, etc.): Home  Other Important info for NEXT shift: Injection appointment scheduled at [facility] 10/2.   Anticipated DC date & active delays: 9/28-- PT rec home with assist or TCU  _____________________________________________________________________________  SUMMARY NOTE:  Orientation/Cognitive: A/Ox4  Observation Goals (Met/ Not Met): Not met   Mobility Level/Assist Equipment: Ax1 GB/W  Antibiotics & Plan (IV/po, length of tx left): NA  Pain Management: PO dilaudid x1  Tele/VS/O2: VSS RA  ABNL Lab/BG: WDL  Diet: Reg  Bowel/Bladder: Cont  Skin Concerns: Intact  Drains/Devices: PIV SL  Patient Stated Goal for Today: Pain control            PRIMARY DIAGNOSIS: ACUTE PAIN  OUTPATIENT/OBSERVATION GOALS TO BE MET BEFORE DISCHARGE:  1. Pain Status: Improved-controlled with oral pain medications.     2. Return to near baseline physical activity: Yes     3. Cleared for discharge by consultants (if involved): Yes        Discharge Planner Nurse  Safe discharge environment identified: Yes  Barriers to discharge: Yes               Please review provider order for any additional goals.   Nurse to notify provider when observation goals have been met and patient is ready for discharge.

## 2024-09-28 NOTE — UTILIZATION REVIEW
Concurrent stay review; Secondary Review Determination     [facility]          Under the authority of the Utilization Management Committee, the utilization review process indicated a secondary review on the above patient.  The review outcome is based on review of the medical records, discussions with staff, and applying clinical experience noted on the date of the review.          (x) Observation Status Appropriate - Concurrent stay review    RATIONALE FOR DETERMINATION        The Patient is 89 y/o  woman, with PMHx significant for  atrial fibrillation ( on Eliquis treatment) , hypertension, rheumatoid arthritis who presents with right flank pain x 3 days radiating to the Right leg.   MRI:  IMPRESSION: 1.  Multilevel spondylosis described above. Modic changes described above. 2.  L2-3, L3-4, L4-5 severe thecal sac stenosis.  3.  L2-L3: Bulge with bilateral foraminal extension. Superimposed right foraminal protrusion. Severe right foraminal stenosis with compression exiting right L2 nerve root.  Neurosurgery recommended outpatient steroid injection.  Eliquis was held today.  During the hospital stay, vital signs have been stable, pain has been controlled with oral medication, CMP in acceptable range, CBC in normal range.  Patient is ready to be discharged.   will be involved.    If no discharge planning for today, I recommend review for halfway care.    Patient is clinically improving and there is no clear indication to change patient's status to inpatient. The severity of illness, intensity of service provided, expected LOS and risk for adverse outcome make the care appropriate for observation.      This document was produced using voice recognition software       The information on this document is developed by the utilization review team in order for the business office to ensure compliance.  This only denotes the appropriateness of proper admission status and does not  reflect the quality of care rendered.         The definitions of Inpatient Status and Observation Status used in making the determination above are those provided in the CMS Coverage Manual, Chapter 1 and Chapter 6, section 70.4.      Sincerely,     RICHARDSON CORTES MD   Utilization Review  Physician Advisor  [facility]

## 2024-09-28 NOTE — PROGRESS NOTES
PRIMARY DIAGNOSIS: ACUTE PAIN  OUTPATIENT/OBSERVATION GOALS TO BE MET BEFORE DISCHARGE:  1. Pain Status: Improved-controlled with oral pain medications.     2. Return to near baseline physical activity: Yes     3. Cleared for discharge by consultants (if involved): Yes-- still needs PT eval        Discharge Planner Nurse  Safe discharge environment identified: Yes  Barriers to discharge: Yes         Please review provider order for any additional goals.   Nurse to notify provider when observation goals have been met and patient is ready for discharge.

## 2024-09-28 NOTE — PROGRESS NOTES
[facility]    Medicine Progress Note - Hospitalist Service    Date of Admission:  9/26/2024    Assessment & Plan   Marcia Gary is a 88 year old female who is anticoagulated on Xarelto with a history of atrial fibrillation, hypertension, rheumatoid arthritis who presents with right flank pain. Patient notes that she has had right flank pain for the past week but worse the past 3 days. Patient describes that her right flank pain occasionally radiates to her right leg.     Patient's daughter-in-law notes that the patient went to [facility] to monitor her recent foot fracture and also had a negative right hip x-ray. [facility] noted that the patient might be having a right-sided sciatica nerve pinch due to her right left lower extremity radiation. Patient states that her pain is not worse with deep respiration. She states that she addressing her pain with flexeril and gabapentin with only minimal relief. She denies taking Tylenol for pain management. She endorses a history of CKD. She denies nausea, vomiting, diarrhea, fever, chills, coughs. She denies dysuria. She denies bowel or bladder dysfunction, RLE weakness.       In the ER she is afebrile, stable vital signs.  BMP is normal, Cr 1.03, , normal CBC.  UA normal.  CT scan of the lumbar spine showed no acute fracture moderate canal stenosis at L2-L3 and severe left foraminal narrowing at L2-L3.  There is severe canal stenosis at L3-L4 and L4-L5.  CT of the pelvis showed no acute fracture, if concern for occult fracture MRI was recommended.  The MRI revealed L2-L3 bulge with bilateral foraminal extension superimposed right foraminal protrusion with severe thecal sac stenosis measuring 6 mm with severe right foraminal stenosis with compression exiting right L2 nerve root.  There is also L2-L3 pronounced interspinous bursitis.     ## Low back pain  ## L2-L3 interspinous bursitis  ## L2-L3 severe right  foraminal stenosis with compression of exiting L2 nerve root.   Patient will be admitted under observation status with pain control with scheduled Tylenol Neurontin physical therapy and neurosurgical evaluation. No red flag symptoms.  Observation status  Schedule tylenol, add ultram for moderate pain and dilaudid for severe pain.  Continue Neurontin  Continue Flexeril  NSG consultation noted and they recommend L2-L3 transforaminal steroid injection, which will be arranged outpatient, Neurosurgery will arrange after injection visit  Patient last dose of Xarelto on 9/25 at 5:30 so unable to to perform procedure as this is less than 48 hours. Plan for outpatient epidural injection on 10/2  Plan for PT for home safety and for pain control - recommend TCU vs home with home PT and Assist of 1; patient family would like TCU  SW/CC consulted for discharge planning, likely TCU     ## Atrial fibrillation  ## Chronic anticoagulation  Last Xarelto dose was on 9/25/24  Hold Xaretlo, plan for outpatient steroid injection.  Continue diltiazem    Chronic stable diagnoses and other pertinent medical history: Appropriate PTA medications will be resumed. Nonessential medications will be held if patient is observation status.      ## Chronic left kidney hydronephrosis  ## CKD stage III  Patient has known chronic left uteropelvic junction obstruction with hydronephrosis with atrophy of the kidney   Her Cr is at baseline     ## Hyperlipidemia  ## Hypertension  Continue Lipitor     ## Hx of RA  ## Hx of Scleroderma          Diet: Combination Diet Regular Diet Adult    DVT Prophylaxis: Pneumatic Compression Devices, Ambulate every shift, and DOAC on hold for procedure  [place] Catheter: Not present  Lines: None     Cardiac Monitoring: None  Code Status: Full Code    Clinically Significant Risk Factors Present on Admission               # Drug Induced Coagulation Defect: home medication list includes an anticoagulant medication    #  Hypertension: Noted on problem list                    Disposition Plan     Medically Ready for Discharge: Ready Now           The patient's care was discussed with the Attending Physician, Dr. Jones, Bedside Nurse, Care Coordinator/, Patient, and Patient's Family.    Becky Mart PA-C  Hospitalist Service  [facility]  Securely message with [employer] (more info)  Text page via [facility] Paging/Directory   ______________________________________________________________________    Interval History   Seen and examined. Daughter at bedside, son on telephone. Continues with pain, able to go all night without med but still significant and requiring orals multiple times per day. PT recs TCU, patient and family agreeable. Continues with right low back pain, intermittently radiating down leg. Given bowel regimen. SW to see. Questions welcomed and answered to the best of my knowledge.      Physical Exam   Vital Signs: Temp: 98.4  F (36.9  C) Temp src: Oral BP: 115/60 Pulse: 67   Resp: 18 SpO2: 98 % O2 Device: None (Room air)    Weight: 130 lbs 9.96 oz  Physical Exam    General: Awake, alert, very pleasant woman who appears stated age. Looks comfortable sitting up in bed. No acute distress.  HEENT: Normocephalic, atraumatic. Extraocular movements intact.   Respiratory: Clear to auscultation bilaterally, no rales, wheezing, or rhonchi.  Cardiovascular: Irregular rate and rhythm, HR controlled, +S1 and S2, no murmur auscultated. No peripheral edema.   Gastrointestinal: Soft, non-tender, non-distended. Bowel sounds present.  Skin: Warm, dry. No obvious rashes or lesions on exposed skin. Dorsalis pedis pulses palpable bilaterally.  Musculoskeletal: No joint swelling, erythema or tenderness. Moves all extremities equally. TTP to right low back ~L4 region. Wiggles toes.   Neurologic: AAO x3. Cranial nerves 2-12 grossly intact, normal strength and sensation.te mood and affect. No obvious  anxiety or depression.      Medical Decision Making       >30 MINUTES SPENT BY ME on the date of service doing chart review, history, exam, documentation & further activities per the note.      Data         Imaging results reviewed over the past 24 hrs:   No results found for this or any previous visit (from the past 24 hour(s)).

## 2024-09-28 NOTE — PROGRESS NOTES
PRIMARY DIAGNOSIS: ACUTE PAIN  OUTPATIENT/OBSERVATION GOALS TO BE MET BEFORE DISCHARGE:  1. Pain Status: Improved-controlled with oral pain medications.    2. Return to near baseline physical activity: No    3. Cleared for discharge by consultants (if involved): Yes    Discharge Planner Nurse   Safe discharge environment identified: No  Barriers to discharge: Yes       Entered by: Mame Ziegler RN 09/28/2024 12:38 PM     Please review provider order for any additional goals.   Nurse to notify provider when observation goals have been met and patient is ready for discharge.

## 2024-09-29 VITALS
TEMPERATURE: 99 F | OXYGEN SATURATION: 99 % | HEIGHT: 63 IN | RESPIRATION RATE: 18 BRPM | BODY MASS INDEX: 23.14 KG/M2 | HEART RATE: 88 BPM | SYSTOLIC BLOOD PRESSURE: 141 MMHG | DIASTOLIC BLOOD PRESSURE: 62 MMHG | WEIGHT: 130.62 LBS

## 2024-09-29 PROCEDURE — G0378 HOSPITAL OBSERVATION PER HR: HCPCS

## 2024-09-29 PROCEDURE — 250N000013 HC RX MED GY IP 250 OP 250 PS 637: Performed by: INTERNAL MEDICINE

## 2024-09-29 PROCEDURE — 99239 HOSP IP/OBS DSCHRG MGMT >30: CPT | Performed by: STUDENT IN AN ORGANIZED HEALTH CARE EDUCATION/TRAINING PROGRAM

## 2024-09-29 RX ORDER — HYDROMORPHONE HYDROCHLORIDE 2 MG/1
2 TABLET ORAL EVERY 4 HOURS PRN
Qty: 30 TABLET | Refills: 0 | Status: SHIPPED | OUTPATIENT
Start: 2024-09-29

## 2024-09-29 RX ORDER — GABAPENTIN 100 MG/1
100 CAPSULE ORAL DAILY
DISCHARGE
Start: 2024-09-30 | End: 2024-09-30

## 2024-09-29 RX ORDER — AMOXICILLIN 250 MG
1 CAPSULE ORAL 2 TIMES DAILY PRN
DISCHARGE
Start: 2024-09-29 | End: 2024-09-30

## 2024-09-29 RX ADMIN — SENNOSIDES AND DOCUSATE SODIUM 2 TABLET: 50; 8.6 TABLET ORAL at 08:26

## 2024-09-29 RX ADMIN — GABAPENTIN 100 MG: 100 CAPSULE ORAL at 08:26

## 2024-09-29 RX ADMIN — DILTIAZEM HYDROCHLORIDE 240 MG: 240 CAPSULE, COATED, EXTENDED RELEASE ORAL at 08:26

## 2024-09-29 RX ADMIN — PANTOPRAZOLE SODIUM 40 MG: 40 TABLET, DELAYED RELEASE ORAL at 06:53

## 2024-09-29 RX ADMIN — ACETAMINOPHEN 650 MG: 325 TABLET ORAL at 12:10

## 2024-09-29 RX ADMIN — Medication 1 CAPSULE: at 08:26

## 2024-09-29 RX ADMIN — HYDROMORPHONE HYDROCHLORIDE 1 MG: 2 TABLET ORAL at 08:26

## 2024-09-29 RX ADMIN — ATORVASTATIN CALCIUM 20 MG: 20 TABLET, FILM COATED ORAL at 08:26

## 2024-09-29 RX ADMIN — HYDROMORPHONE HYDROCHLORIDE 1 MG: 2 TABLET ORAL at 12:27

## 2024-09-29 ASSESSMENT — ACTIVITIES OF DAILY LIVING (ADL)
ADLS_ACUITY_SCORE: 39
ADLS_ACUITY_SCORE: 41
ADLS_ACUITY_SCORE: 39
ADLS_ACUITY_SCORE: 39
ADLS_ACUITY_SCORE: 41
ADLS_ACUITY_SCORE: 39
ADLS_ACUITY_SCORE: 41

## 2024-09-29 NOTE — DISCHARGE SUMMARY
[facility]  Hospitalist Discharge Summary      Date of Admission:  9/26/2024  Date of Discharge:  9/29/2024  Discharging Provider: Emiliano Hanson MD  Discharge Service: Hospitalist Service    Discharge Diagnoses     ## Low back pain  ## L2-L3 interspinous bursitis  ## L2-L3 severe right foraminal stenosis with compression of exiting L2 nerve root    Clinically Significant Risk Factors          Follow-ups Needed After Discharge   Follow-up Appointments     Follow Up and recommended labs and tests      Follow up with Nursing home physician.  No follow up labs or test are   needed.            Unresulted Labs Ordered in the Past 30 Days of this Admission       No orders found from 8/27/2024 to 9/27/2024.          Discharge Disposition   Discharged to long-term care facility  Condition at discharge: Stable    Hospital Course      Marcia Gary is a 88 year old female who is anticoagulated on Xarelto with a history of atrial fibrillation, hypertension, rheumatoid arthritis who presents with right flank pain. Patient notes that she has had right flank pain for the past week but worse the past 3 days. Patient describes that her right flank pain occasionally radiates to her right leg.      Patient's daughter-in-law notes that the patient went to [facility] to monitor her recent foot fracture and also had a negative right hip x-ray. [facility] noted that the patient might be having a right-sided sciatica nerve pinch due to her right left lower extremity radiation. Patient states that her pain is not worse with deep respiration. She states that she addressing her pain with flexeril and gabapentin with only minimal relief. She denies taking Tylenol for pain management. She endorses a history of CKD. She denies nausea, vomiting, diarrhea, fever, chills, coughs. She denies dysuria. She denies bowel or bladder dysfunction, RLE weakness.       In the ER she is afebrile, stable vital  signs.  BMP is normal, Cr 1.03, , normal CBC.  UA normal.  CT scan of the lumbar spine showed no acute fracture moderate canal stenosis at L2-L3 and severe left foraminal narrowing at L2-L3.  There is severe canal stenosis at L3-L4 and L4-L5.  CT of the pelvis showed no acute fracture, if concern for occult fracture MRI was recommended.  The MRI revealed L2-L3 bulge with bilateral foraminal extension superimposed right foraminal protrusion with severe thecal sac stenosis measuring 6 mm with severe right foraminal stenosis with compression exiting right L2 nerve root.  There is also L2-L3 pronounced interspinous bursitis.     ## Low back pain  ## L2-L3 interspinous bursitis  ## L2-L3 severe right foraminal stenosis with compression of exiting L2 nerve root.   Patient will be admitted under observation status with pain control with scheduled Tylenol Neurontin physical therapy and neurosurgical evaluation. No red flag symptoms.  Observation status  Schedule tylenol, add ultram for moderate pain and dilaudid for severe pain.  Continue Neurontin  Continue Flexeril  NSG consultation noted and they recommend L2-L3 transforaminal steroid injection, which will be arranged outpatient, Neurosurgery will arrange after injection visit  Patient last dose of Xarelto on 9/25 at 5:30 so unable to to perform procedure as this is less than 48 hours. Plan for outpatient epidural injection on 10/2  Plan for PT for home safety and for pain control - recommend TCU vs home with home PT and Assist of 1; patient family would like TCU     ## Atrial fibrillation  ## Chronic anticoagulation  Last Xarelto dose was on 9/25/24  Hold Xaretlo, plan for outpatient steroid injection.  Continue diltiazem     Chronic stable diagnoses and other pertinent medical history: Appropriate PTA medications will be resumed. Nonessential medications will be held if patient is observation status.      ## Chronic left kidney hydronephrosis  ## CKD stage  III  Patient has known chronic left uteropelvic junction obstruction with hydronephrosis with atrophy of the kidney   Her Cr is at baseline     ## Hyperlipidemia  ## Hypertension  Continue Lipitor     ## Hx of RA  ## Hx of Scleroderma       Consultations This Hospital Stay   NEUROSURGERY IP CONSULT  INTERVENTIONAL RADIOLOGY ADULT/PEDS IP CONSULT  PHYSICAL THERAPY ADULT IP CONSULT  CARE MANAGEMENT / SOCIAL WORK IP CONSULT  PHYSICAL THERAPY ADULT IP CONSULT    Code Status   Full Code    Time Spent on this Encounter   I, Emiliano Hanson MD, personally saw the patient today and spent greater than 30 minutes discharging this patient.       Emiliano Hanson MD  [facility] EXTENDED RECOVERY AND SHORT STAY  [address] [address]  Phone: [phone]  ______________________________________________________________________    Physical Exam   Vital Signs: Temp: 99.1  F (37.3  C) Temp src: Oral BP: (!) 151/69 Pulse: 84   Resp: 18 SpO2: 97 % O2 Device: None (Room air)    Weight: 130 lbs 9.96 oz  ----------------------------------------------------------------------------------------       Primary Care Physician   Joseph Pelayo    Discharge Orders      General info for SNF    Length of Stay Estimate: Short Term Care: Estimated # of Days <30  Condition at Discharge: Stable  Level of care:skilled   Rehabilitation Potential: Good  Admission H&P remains valid and up-to-date: Yes  Recent Chemotherapy: N/A  Use Nursing Home Standing Orders: Yes     Mantoux instructions    Give two-step Mantoux (PPD) Per Facility Policy Yes     Follow Up and recommended labs and tests    Follow up with Nursing home physician.  No follow up labs or test are needed.     Intake and output    Every shift     Daily weights    Call Provider for weight gain of more than 2 pounds per day or 5 pounds per week.     Reason for your hospital stay    You had uncontrolled back pain     Activity - Up with assistive device     Full  Code     Physical Therapy Adult Consult    Evaluate and treat as clinically indicated.    Reason:  physical deconditioning     Fall precautions     Diet    Follow this diet upon discharge: Current Diet:Orders Placed This Encounter      Combination Diet Regular Diet Adult       Significant Results and Procedures   Most Recent 3 CBC's:  Recent Labs   Lab Test 09/27/24  0609 09/26/24  1336 04/11/24  0608   WBC 7.3 6.7 9.1   HGB 12.4 12.6 10.3*    100 96    202 195     Most Recent 3 BMP's:  Recent Labs   Lab Test 09/27/24  0609 09/26/24  1336 05/13/24  1028    140 140   POTASSIUM 4.4 4.5 4.2   CHLORIDE 104 105 108*   CO2 30* 26 24   BUN 20.4 24.6* 21.1   CR 0.89 1.03* 1.11*   ANIONGAP 7 9 8   ARABELLA 9.9 10.1 10.0   * 104* 103*     Most Recent 2 LFT's:  Recent Labs   Lab Test 09/26/24  1336 04/10/24  0848   AST 19 21   ALT 16 9   ALKPHOS 112 129   BILITOTAL 0.5 0.6     Most Recent 3 INR's:No lab results found.  Most Recent 3 Troponin's:No lab results found.  Most Recent 3 BNP's:No lab results found.  Most Recent D-dimer:No lab results found.  Most Recent Cholesterol Panel:  Recent Labs   Lab Test 07/05/19  0000   CHOL 141   LDL 66   HDL 67   TRIG 38     7-Day Micro Results       No results found for the last 168 hours.          Most Recent TSH and T4:No lab results found.  Most Recent Urinalysis:  Recent Labs   Lab Test 09/26/24  1337 04/10/24  1357 04/07/24  1328   COLOR Yellow   < > Yellow   APPEARANCE Clear   < > Clear   URINEGLC Negative   < > Negative   URINEBILI Negative   < > Negative   URINEKETONE Negative   < > Trace*   SG 1.015   < > 1.025   UBLD Negative   < > Negative   URINEPH 6.0   < > 5.5   PROTEIN 30*   < > Trace*   UROBILINOGEN  --   --  0.2   NITRITE Negative   < > Negative   LEUKEST Negative   < > Negative   RBCU <1   < > 0-2   WBCU <1   < > 5-10*    < > = values in this interval not displayed.     Most Recent ESR & CRP:No lab results found.,   Results for orders placed or  performed during the hospital encounter of 09/26/24   CT Pelvis Bone wo Contrast    Narrative    EXAM: CT PELVIS BONE WO CONTRAST  DATE/TIME: 9/26/2024 2:18 PM    INDICATION: Iliac wing and back pain and tenderness. Rule out  fracture.  COMPARISON: 7/26/2024  TECHNIQUE: Noncontrast. Axial, sagittal and coronal thin-section  reconstruction. Dose reduction techniques were used.     FINDINGS:     BONES:  Severe diffuse osteopenia which limits evaluation for subtle osseous  lesions or nondisplaced fractures. Within this limitation, no fracture  is seen.    Moderate degenerative changes of the sacroiliac joints, left greater  than right with joint space narrowing and subchondral sclerosis.  Multilevel degenerative changes of the lumbar spine with several  levels of severe facet arthropathy. Mild joint space narrowing  superiorly in both hips.    No aggressive appearing osseous lesions.    SOFT TISSUES:  No hip effusion. Mild diffuse subcutaneous edema. No discrete fluid  collection. Mild diffuse fatty infiltration of the pelvic musculature.  Benign-appearing soft tissue calcifications in the left gluteal  subcutaneous fat which may relate to injections. No enlarged lymph  nodes. Dense atherosclerotic vascular calcifications.    No acute findings within the visualized abdomen or pelvis. Colonic  diverticulosis.      Impression    IMPRESSION:  1.  Within the limitations of osseous demineralization, no fracture is  seen. If there is persistent clinical concern for occult fracture, MRI  is more sensitive.  2.  Mild hip degenerative change.  3.  Moderate degenerative changes of the sacroiliac joints, left  greater than right.    Lamebrto Elmore MD         SYSTEM ID:  [id number]   CT Lumbar Spine w/o Contrast    Narrative    CT LUMBAR SPINE WITHOUT CONTRAST  9/26/2024 2:18 PM     HISTORY: back pain, eval fracture or signs of radiculopathy      TECHNIQUE: Axial images of the lumbar spine were obtained without  intravenous  contrast. Multiplanar reformations were performed.   Radiation dose for this scan was reduced using automated exposure  control, adjustment of the mA and/or kV according to patient size, or  iterative reconstruction technique.     COMPARISON: None.     FINDINGS:      No acute fracture.    Moderate canal stenosis at L2-3. Severe left foraminal narrowing at  L2-3.    Severe canal stenosis at L3-4 and L4-5.    Visualized paraspinous tissues: Large left renal cyst.      Impression    IMPRESSION:  No acute fracture.     KILO ANDRADE MD         SYSTEM ID:  [id number]   Lumbar spine MRI w/o contrast    Narrative    EXAM: MR LUMBAR SPINE W/O CONTRAST  LOCATION: [facility]  DATE: 9/26/2024    INDICATION: R back pain with radiation down R thigh  COMPARISON: CT lumbar spine 9/26/2024  TECHNIQUE: Routine Lumbar Spine MRI without IV contrast.    FINDINGS:   Nomenclature is based on 5 lumbar type vertebral bodies.  Normal vertebral body heights.  No concerning bone marrow lesions.  Normal distal spinal cord and cauda equina with conus medullaris at L1-L2.  Unremarkable visualized bony pelvis.      Multilevel degenerative disc disease. Mild degenerative type I Modic changes L2-3 and to lesser extent L1-2.    Multilevel facet arthropathy worse within the lower lumbar spine. Irregularity spinous processes suggest Baastrup's disease. L2-3 pronounced interspinous bursitis. L1-2 mild interspinous bursitis. Bilateral SI degenerative joint disease.    L3-4 mild degenerative grade 1 anterolisthesis measuring 5 mm.  L4-5 degenerative grade 1 anterolisthesis measuring 1 mm.    T11-T12: Mild bulge. No significant thecal sac stenosis. No significant neural foraminal stenosis.    T12-L1: No significant bulge or posterior disc protrusion. No significant thecal sac stenosis. No significant neural foraminal stenosis.      L1-L2: Slight bulge. Mild facet hypertrophy. No significant thecal sac stenosis. Mild bilateral  foraminal stenosis.    L2-L3: Bulge with bilateral foraminal extension. Superimposed right foraminal protrusion. Facet hypertrophy. Severe thecal sac stenosis measuring 6 mm AP dimension. Mild to moderate left foraminal stenosis. Severe right foraminal stenosis with   compression exiting right L2 nerve root.    L3-L4: Grade 1 anterolisthesis described above. Bulge. Hypertrophic facet arthropathy. Severe thecal sac stenosis measuring 6 mm AP dimension. Mild to moderate left foraminal stenosis. Mild right foraminal stenosis.    L4-L5: Slight bulge. Hypertrophic facet arthropathy. Severe thecal sac stenosis measuring 6 cm AP dimension. Mild bilateral foraminal stenosis.    L5-S1: Mild bulge. Facet hypertrophy. No significant thecal sac stenosis. No significant left foraminal stenosis. Mild right foraminal stenosis.    EXTRASPINAL FINDINGS:  Left kidney severe hydronephrosis with likely superimposed left foraminal stenosis redemonstrated from CT abdomen pelvis 4/10/2024. Right kidney demonstrate several small renal cysts. Small left adrenal nodule similar compared to CT abdomen pelvis   4/10/2024.      Impression     IMPRESSION:  1.  Multilevel spondylosis described above. Modic changes described above.    2.  L2-3, L3-4, L4-5 severe thecal sac stenosis.    3.  L2-L3: Bulge with bilateral foraminal extension. Superimposed right foraminal protrusion. Severe right foraminal stenosis with compression exiting right L2 nerve root.                 Discharge Medications   Current Discharge Medication List        START taking these medications    Details   gabapentin (NEURONTIN) 100 MG capsule Take 1 capsule (100 mg) by mouth daily.    Associated Diagnoses: Acute right-sided low back pain with right-sided sciatica      HYDROmorphone (DILAUDID) 2 MG tablet Take 1 tablet (2 mg) by mouth every 4 hours as needed for severe pain (IF pain not managed with non-pharmacological and non-opioid interventions).  Qty: 30 tablet, Refills: 0     Associated Diagnoses: Acute right-sided low back pain with right-sided sciatica      senna-docusate (SENOKOT-S/PERICOLACE) 8.6-50 MG tablet Take 1 tablet by mouth 2 times daily as needed for constipation.    Associated Diagnoses: Acute right-sided low back pain with right-sided sciatica           CONTINUE these medications which have NOT CHANGED    Details   acetaminophen (TYLENOL) 500 MG tablet Take 2 tablets (1,000 mg) by mouth every 6 hours as needed    Associated Diagnoses: Acute pyelonephritis      atorvastatin (LIPITOR) 20 MG tablet Take 1 tablet (20 mg) by mouth daily  Qty: 30 tablet, Refills: 11    Associated Diagnoses: Coronary artery disease involving native coronary artery of native heart without angina pectoris      cyclobenzaprine (FLEXERIL) 10 MG tablet Take 10 mg by mouth 3 times daily as needed.      diltiazem (CARTIA XT) 240 MG 24 hr capsule Take 1 capsule (240 mg) by mouth daily  Qty: 90 capsule, Refills: 3    Associated Diagnoses: Benign essential hypertension; Chronic atrial fibrillation (H); Paroxysmal atrial fibrillation (H)      Multiple Vitamins-Minerals (PRESERVISION AREDS 2 PO) Take 1 capsule by mouth 2 times daily      Omeprazole 20 MG TBDD Take 20 mg by mouth 2 times daily (before meals)           STOP taking these medications       rivaroxaban ANTICOAGULANT (XARELTO) 15 MG TABS tablet Comments:   Reason for Stopping:             Allergies   Allergies   Allergen Reactions    Smz-Tmp Ds [Sulfamethoxazole-Trimethoprim] Rash     Itching. (800/160 mg twice day)    Amoxicillin     Amoxicillin-Pot Clavulanate     Cephalexin     Doxycycline Nausea     Headaches

## 2024-09-29 NOTE — PROGRESS NOTES
Care Management Follow Up    Length of Stay (days): 0    Expected Discharge Date: 09/29/2024     Concerns to be Addressed:       Patient plan of care discussed at interdisciplinary rounds: Yes    Anticipated Discharge Disposition:  (tcu)              Anticipated Discharge Services:    Anticipated Discharge DME:      Patient/family educated on Medicare website which has current facility and service quality ratings:    Education Provided on the Discharge Plan:    Patient/Family in Agreement with the Plan: yes    Referrals Placed by CM/SW:    Private pay costs discussed: Not applicable    Discussed  Partnership in Safe Discharge Planning  document with patient/family: No     Handoff Completed: No, handoff not indicated or clinically appropriate    Additional Information:  Writer placed phone call to [facility] to see what their availability is today.  Writer attended morning charge report. Charge Rn states that pt is likely medically stable for discharge today.  Writer sent message to Doctor to see if pt is medically stable for discharge today.   Addendum: Writer is informed by Doctor Margie that he is discharging pt.   Writer spoke with Renee at [facility] who states they can take pt today.  Writer met with pt and pt's daughter Savi at bedside. Writer informed them that writer has not heard back from [facility] but [facility] is able to accept as of today. Pt and Savi are agreeable to discharge there today. Pt and Savi both feel that pt will be ok being transported by family as it will be two family members or more. Pt and Savi are agreeable to a 2pm discharge. Pt and savi state no other questions or concerns at this time.    Writer updated bedside RN, charge Rn of discharge at 2pm. Writer called Renee and informed her of 2pm discharge. Renee is agreeable. Renee would like discharge orders faxed to [phone] . Writer faxed hard script and discharge orders to Renee.   Writer completed passr, faxed to tcu and placed  in pt's chart.  PAS-RR    D: Per DHS regulation, SW completed and submitted PAS-RR to [place] [employer] Direct Connect via the Senior Link[employer] Line.  PAS-RR confirmation # is : [id number]    P: Further questions may be directed to Senior [employer] Line at #[phone], option #4 for PAS-RR staff.    Next Steps: waiting to see if pt is medically stable for discharge today.     Bibi Elizalde, BSW  Social Work  [facility]

## 2024-09-29 NOTE — PLAN OF CARE
Date/Time: 9/28/24 1900-0730    Summary: 87 y/o F w/PMH of afib, anticoagulated on Xarelto, HTN, and RA. Presented to ED on 9/28 with R flank pain ongoing for 1 week.   Diagnosis: L2-L3 interspinous busitis and severe R foraminal stenosis with compression of exiting L2 nerve root.  Orientation: A&Ox4  Behavior & Aggression: green  Activity: Ax1, GB/walker  Diet: reg  Fall risk: yes  Isolation: N/A  Pain: PRN 2 mg oral dilaudid x1.  B&B: continent, no BM this shift.  VS/O2: VSS, RA.  IV: R PIV, SL.  Drains/Devices: N/A  Tele: N/A  Abnormal Labs: none this shift.  Skin: intact  Consults/Procedures: N/A; PT following and neuro follow up outpatient for injection; scheduled @ [facility] on 10/2 @ 1545.  D/C Date: poss 9/28 to TAMARA/Martine.  Other: N/A

## 2024-09-29 NOTE — PLAN OF CARE
Goal Outcome Evaluation:    A & O times four. Reports right lower back/pelvic area discomfort .  Verbalized decrease pain after PRN meds. Assist of one, GB and walker.   Discharge to [facility] today. Discharge paper given to family

## 2024-09-29 NOTE — PLAN OF CARE
Date/Time: 9/29/24 [id number]    Summary: 87 y/o F w/PMH of afib, anticoagulated on Xarelto, HTN, and RA. Presented to ED on 9/28 with R flank pain ongoing for 1 week.   Diagnosis: L2-L3 interspinous busitis and severe R foraminal stenosis with compression of exiting L2 nerve root.  Orientation: A&Ox4  Behavior & Aggression: green  Activity: Ax1, GB/walker  Diet: reg  Fall risk: yes  Isolation: N/A  Pain: PRN 1 mg oral dilaudid x1; uses heat pack as well.  B&B: continent, no BM this shift.  VS/O2: VSS, RA.  IV: R PIV, SL.  Drains/Devices: N/A  Tele: N/A  Abnormal Labs: none this shift.  Skin: intact  Consults/Procedures: N/A; PT following and neuro follow up outpatient for injection; scheduled @ [facility] on 10/2 @ 1545.  D/C Date: poss 9/28 to T[facility]ARA/[facility].  Other: N/A

## 2024-09-30 ENCOUNTER — TRANSITIONAL CARE UNIT VISIT (OUTPATIENT)
Dept: GERIATRICS | Facility: CLINIC | Age: 89
End: 2024-09-30
Payer: MEDICARE

## 2024-09-30 ENCOUNTER — TELEPHONE (OUTPATIENT)
Dept: INTERVENTIONAL RADIOLOGY/VASCULAR | Facility: CLINIC | Age: 89
End: 2024-09-30

## 2024-09-30 ENCOUNTER — DOCUMENTATION ONLY (OUTPATIENT)
Dept: GERIATRICS | Facility: CLINIC | Age: 89
End: 2024-09-30

## 2024-09-30 VITALS
WEIGHT: 132 LBS | DIASTOLIC BLOOD PRESSURE: 64 MMHG | OXYGEN SATURATION: 93 % | HEIGHT: 63 IN | BODY MASS INDEX: 23.39 KG/M2 | HEART RATE: 78 BPM | TEMPERATURE: 97.8 F | RESPIRATION RATE: 18 BRPM | SYSTOLIC BLOOD PRESSURE: 105 MMHG

## 2024-09-30 DIAGNOSIS — M54.41 ACUTE RIGHT-SIDED LOW BACK PAIN WITH RIGHT-SIDED SCIATICA: ICD-10-CM

## 2024-09-30 DIAGNOSIS — M54.41 ACUTE BILATERAL LOW BACK PAIN WITH RIGHT-SIDED SCIATICA: Primary | ICD-10-CM

## 2024-09-30 DIAGNOSIS — N10 ACUTE PYELONEPHRITIS: ICD-10-CM

## 2024-09-30 DIAGNOSIS — I48.20 CHRONIC ATRIAL FIBRILLATION (H): ICD-10-CM

## 2024-09-30 DIAGNOSIS — M06.9 RHEUMATOID ARTHRITIS, INVOLVING UNSPECIFIED SITE, UNSPECIFIED WHETHER RHEUMATOID FACTOR PRESENT (H): ICD-10-CM

## 2024-09-30 DIAGNOSIS — M34.9 SCLERODERMA (H): ICD-10-CM

## 2024-09-30 DIAGNOSIS — K59.03 DRUG-INDUCED CONSTIPATION: ICD-10-CM

## 2024-09-30 DIAGNOSIS — M48.062 SPINAL STENOSIS, LUMBAR REGION, WITH NEUROGENIC CLAUDICATION: Chronic | ICD-10-CM

## 2024-09-30 DIAGNOSIS — R53.81 PHYSICAL DECONDITIONING: ICD-10-CM

## 2024-09-30 DIAGNOSIS — N18.30 STAGE 3 CHRONIC KIDNEY DISEASE, UNSPECIFIED WHETHER STAGE 3A OR 3B CKD (H): ICD-10-CM

## 2024-09-30 DIAGNOSIS — I10 BENIGN ESSENTIAL HYPERTENSION: ICD-10-CM

## 2024-09-30 PROCEDURE — 99310 SBSQ NF CARE HIGH MDM 45: CPT | Performed by: NURSE PRACTITIONER

## 2024-09-30 RX ORDER — AMOXICILLIN 250 MG
1 CAPSULE ORAL 2 TIMES DAILY
Status: SHIPPED
Start: 2024-09-30

## 2024-09-30 RX ORDER — GABAPENTIN 100 MG/1
100 CAPSULE ORAL 3 TIMES DAILY
Status: SHIPPED
Start: 2024-09-30

## 2024-09-30 RX ORDER — POLYETHYLENE GLYCOL 3350 17 G/17G
17 POWDER, FOR SOLUTION ORAL DAILY PRN
Status: SHIPPED
Start: 2024-09-30

## 2024-09-30 RX ORDER — ACETAMINOPHEN 500 MG
1000 TABLET ORAL 3 TIMES DAILY
Status: SHIPPED
Start: 2024-09-30

## 2024-09-30 NOTE — PLAN OF CARE
Physical Therapy Discharge Summary    Reason for therapy discharge:    Discharged to transitional care facility.    Progress towards therapy goal(s). See goals on Care Plan in [facility] electronic health record for goal details.  Goals partially met.  Barriers to achieving goals:   discharge from facility.    Therapy recommendation(s):    Continued therapy is recommended.  Rationale/Recommendations:  Pt is below baseline, currently requiring assist of 1 with use of FWW and unable to ambulate >30'. If pt's family is able to provide assist of 1, pt would be able to return home with Home PT. Pt is unsure if she will be able to have assist upon discharge, if pt's family is unable to provide assist pt will require TCU.      *Discharging physical therapist did not work directly with patient. Therapy recommendation(s) taken from previous treating therapist's treatment note from last treatment session.

## 2024-09-30 NOTE — TELEPHONE ENCOUNTER
Called patient's daughter Savi regarding appointment  on 10/2/24. Pt instructed to arrive 15 minutes prior to procedure. Pt previously on blood thinners. Xarelto to be held and has been held sicne hospital admission. Allergies reviewed. No infections or antibiotics. Pt verbalized understanding of instructions.

## 2024-09-30 NOTE — LETTER
9/30/2024      Marcia Gary  [address]  [facility] [address]        [facility] GERIATRICS    PRIMARY CARE PROVIDER AND CLINIC:  Joseph Pelayo MD, [address] / [place] [address]  Chief Complaint   Patient presents with     Hospital F/U      [facility] Medical Record Number:  [id number]  Place of Service where encounter took place:  [facility]) [25]    Marcia Gary  is a 88 year old  (1935), admitted to the above facility from  [facility]. Hospital stay 9/26/24 through 9/29/24..   HPI:    PMH HTN, atrial fib, CKD,  RA who presents with right flank pain.   Low back pain  L2-L3 interspinous bursitis  L2-L3 severe right foraminal stenosis with nerve root compression  Orthopedics followed, recommend transforaminal steroid injection, arrange for OP  On exam today patient is sitting up on edge of bed, states low back pain radiates from right hip/groin area around to low back, she has occasional pain radiating down the back of her right leg. Rates pain as 7/10 at rest and increases to 10/10 with activity. Denies CP, palpitations, SOB, N/V/D states she is constipated has not had a BM for a few days.     CODE STATUS/ADVANCE DIRECTIVES DISCUSSION:  Full Code  CPR/Full code   ALLERGIES:   Allergies   Allergen Reactions     Smz-Tmp Ds [Sulfamethoxazole-Trimethoprim] Rash     Itching. (800/160 mg twice day)     Metoprolol Hives     Amoxicillin      Amoxicillin-Pot Clavulanate      Cephalexin      Clavulanic Acid Other (See Comments)     Other reaction(s): Adverse reaction     Sulfamethoxazole      Trimethoprim      Doxycycline Nausea     Headaches       PAST MEDICAL HISTORY:   Past Medical History:   Diagnosis Date     Atrial fibrillation (H)      Chronic kidney disease      Hypertension      Mitral regurgitation 2010 2010 Echo - mild-mod MR     Rheumatic fever      Rheumatoid arthritis (H)      Scleroderma (H)      Sleep apnea       PAST  "SURGICAL HISTORY:   has a past surgical history that includes Coronary Angiography Adult Order (7/2008).  FAMILY HISTORY: family history is not on file.  SOCIAL HISTORY:   reports that she has never smoked. She has never used smokeless tobacco.  Patient's living condition: lives alone    Post Discharge Medication Reconciliation Status:   MED REC REQUIRED  Post Medication Reconciliation Status: discharge medications reconciled and changed, per note/orders       Current Outpatient Medications   Medication Sig Dispense Refill     acetaminophen (TYLENOL) 500 MG tablet Take 2 tablets (1,000 mg) by mouth every 6 hours as needed       atorvastatin (LIPITOR) 20 MG tablet Take 1 tablet (20 mg) by mouth daily 30 tablet 11     cyclobenzaprine (FLEXERIL) 10 MG tablet Take 10 mg by mouth 3 times daily as needed.       diltiazem (CARTIA XT) 240 MG 24 hr capsule Take 1 capsule (240 mg) by mouth daily 90 capsule 3     gabapentin (NEURONTIN) 100 MG capsule Take 1 capsule (100 mg) by mouth daily.       HYDROmorphone (DILAUDID) 2 MG tablet Take 1 tablet (2 mg) by mouth every 4 hours as needed for severe pain (IF pain not managed with non-pharmacological and non-opioid interventions). 30 tablet 0     Multiple Vitamins-Minerals (PRESERVISION AREDS 2 PO) Take 1 capsule by mouth 2 times daily       Omeprazole 20 MG TBDD Take 20 mg by mouth 2 times daily (before meals)       senna-docusate (SENOKOT-S/PERICOLACE) 8.6-50 MG tablet Take 1 tablet by mouth 2 times daily as needed for constipation.       No current facility-administered medications for this visit.       ROS:  10 point ROS of systems including Constitutional, Eyes, Respiratory, Cardiovascular, Gastroenterology, Genitourinary, Integumentary, Musculoskeletal, Psychiatric were all negative except for pertinent positives noted in my HPI.    Vitals:  /64   Pulse 78   Temp 97.8  F (36.6  C)   Resp 18   Ht 1.6 m (5' 3\")   Wt 59.9 kg (132 lb)   SpO2 93%   BMI 23.38 kg/m  "   Exam:  GENERAL APPEARANCE:  Alert, in no distress  ENT:  Mouth and posterior oropharynx normal, moist mucous membranes, [place]  EYES:  EOM, conjunctivae, lids, pupils and irises normal, PERRL  RESP:  respiratory effort and palpation of chest normal, lungs clear to auscultation , no respiratory distress  CV:  regular rate and rhythm, no murmur, rub, or gallop, peripheral edema trace+ in LE bilaterally  ABDOMEN:  normal bowel sounds, soft, nontender, no hepatosplenomegaly or other masses  M/S:   patient sitting up on edge of bed  SKIN:  Inspection of skin and subcutaneous tissue baseline  NEURO:   speech wnl  PSYCH:  affect and mood normal    Lab/Diagnostic data:  Recent labs in [facility] reviewed by me today.  and Most Recent 3 CBC's:  Recent Labs   Lab Test 09/27/24  0609 09/26/24  1336 04/11/24  0608   WBC 7.3 6.7 9.1   HGB 12.4 12.6 10.3*    100 96    202 195     Most Recent 3 BMP's:  Recent Labs   Lab Test 09/27/24  0609 09/26/24  1336 05/13/24  1028    140 140   POTASSIUM 4.4 4.5 4.2   CHLORIDE 104 105 108*   CO2 30* 26 24   BUN 20.4 24.6* 21.1   CR 0.89 1.03* 1.11*   ANIONGAP 7 9 8   ARABELLA 9.9 10.1 10.0   * 104* 103*       ASSESSMENT/PLAN:    (M54.41) Acute bilateral low back pain with right-sided sciatica  (primary encounter diagnosis)  (M48.062) Spinal stenosis, lumbar region, with neurogenic claudication  (R53.81) Physical deconditioning  Comment: acute/ongoing  Plan: PT and OT, will have epidural steroid injection 10/2/24, schedule tylenol 1000mg TID and increase gabapentin to 100mg TID, continue cyclobenazaprine 10mg TID prn and hydromorphone 2mg q 4 hours prn    (I10) Benign essential hypertension  (I48.20) Chronic atrial fibrillation (H)  (N18.30) Stage 3 chronic kidney disease, unspecified whether stage 3a or 3b CKD (H)  Comment: acute/ongoing  Plan: BMP follow, continue diltiazem 240mg QD    (M06.9) Rheumatoid arthritis, involving unspecified site, unspecified whether rheumatoid  factor present (H)  (M34.9) Scleroderma (H)  Comment: ongoing  Plan: PT and OT, continue pain medications as above, f/u with rheumatology as OP    (K59.03) drug induced constipation  Acute/ongoing  Plan: senna s 1 PO BID scheduled, miralax 17gm QD prn      Orders:  Change tylenol to 1000mg TID scheduled  Increase gabapentin to 100mg TID  BMP and CBC on Thursday  Change senna s to 1 PO BID scheduled  Add miralax 17gm QD prn    Total time with patient visit: 46 minutes including discussions about the POC and care coordination with the patient. Greater than 50% of total time spent with counseling and coordinating care due to reviewed internal medicine progress notes, medication changes and lab and imaging results, discussed POC, medications, pain control and bowel medications with patient at bedside. .   Electronically signed by:  Tonya Lynn Haase, APRN CNP                   Sincerely,        Tonya Lynn Haase, APRN CNP

## 2024-09-30 NOTE — PROGRESS NOTES
[facility] GERIATRICS    PRIMARY CARE PROVIDER AND CLINIC:  Joseph ePlayo MD, [address] / [place] [address]  Chief Complaint   Patient presents with    Hospital F/U      [facility] Medical Record Number:  [id number]  Place of Service where encounter took place:  [facility] ([facility]) [25]    Marcia Gary  is a 88 year old  (1935), admitted to the above facility from  [facility]. Hospital stay 9/26/24 through 9/29/24..   HPI:    PMH HTN, atrial fib, CKD,  RA who presents with right flank pain.   Low back pain  L2-L3 interspinous bursitis  L2-L3 severe right foraminal stenosis with nerve root compression  Orthopedics followed, recommend transforaminal steroid injection, arrange for OP  On exam today patient is sitting up on edge of bed, states low back pain radiates from right hip/groin area around to low back, she has occasional pain radiating down the back of her right leg. Rates pain as 7/10 at rest and increases to 10/10 with activity. Denies CP, palpitations, SOB, N/V/D states she is constipated has not had a BM for a few days.     CODE STATUS/ADVANCE DIRECTIVES DISCUSSION:  Full Code  CPR/Full code   ALLERGIES:   Allergies   Allergen Reactions    Smz-Tmp Ds [Sulfamethoxazole-Trimethoprim] Rash     Itching. (800/160 mg twice day)    Metoprolol Hives    Amoxicillin     Amoxicillin-Pot Clavulanate     Cephalexin     Clavulanic Acid Other (See Comments)     Other reaction(s): Adverse reaction    Sulfamethoxazole     Trimethoprim     Doxycycline Nausea     Headaches       PAST MEDICAL HISTORY:   Past Medical History:   Diagnosis Date    Atrial fibrillation (H)     Chronic kidney disease     Hypertension     Mitral regurgitation 2010 2010 Echo - mild-mod MR    Rheumatic fever     Rheumatoid arthritis (H)     Scleroderma (H)     Sleep apnea       PAST SURGICAL HISTORY:   has a past surgical history that includes Coronary Angiography Adult Order  "(7/2008).  FAMILY HISTORY: family history is not on file.  SOCIAL HISTORY:   reports that she has never smoked. She has never used smokeless tobacco.  Patient's living condition: lives alone    Post Discharge Medication Reconciliation Status:   MED REC REQUIRED  Post Medication Reconciliation Status: discharge medications reconciled and changed, per note/orders       Current Outpatient Medications   Medication Sig Dispense Refill    acetaminophen (TYLENOL) 500 MG tablet Take 2 tablets (1,000 mg) by mouth every 6 hours as needed      atorvastatin (LIPITOR) 20 MG tablet Take 1 tablet (20 mg) by mouth daily 30 tablet 11    cyclobenzaprine (FLEXERIL) 10 MG tablet Take 10 mg by mouth 3 times daily as needed.      diltiazem (CARTIA XT) 240 MG 24 hr capsule Take 1 capsule (240 mg) by mouth daily 90 capsule 3    gabapentin (NEURONTIN) 100 MG capsule Take 1 capsule (100 mg) by mouth daily.      HYDROmorphone (DILAUDID) 2 MG tablet Take 1 tablet (2 mg) by mouth every 4 hours as needed for severe pain (IF pain not managed with non-pharmacological and non-opioid interventions). 30 tablet 0    Multiple Vitamins-Minerals (PRESERVISION AREDS 2 PO) Take 1 capsule by mouth 2 times daily      Omeprazole 20 MG TBDD Take 20 mg by mouth 2 times daily (before meals)      senna-docusate (SENOKOT-S/PERICOLACE) 8.6-50 MG tablet Take 1 tablet by mouth 2 times daily as needed for constipation.       No current facility-administered medications for this visit.       ROS:  10 point ROS of systems including Constitutional, Eyes, Respiratory, Cardiovascular, Gastroenterology, Genitourinary, Integumentary, Musculoskeletal, Psychiatric were all negative except for pertinent positives noted in my HPI.    Vitals:  /64   Pulse 78   Temp 97.8  F (36.6  C)   Resp 18   Ht 1.6 m (5' 3\")   Wt 59.9 kg (132 lb)   SpO2 93%   BMI 23.38 kg/m    Exam:  GENERAL APPEARANCE:  Alert, in no distress  ENT:  Mouth and posterior oropharynx normal, moist " mucous membranes, [place]  EYES:  EOM, conjunctivae, lids, pupils and irises normal, PERRL  RESP:  respiratory effort and palpation of chest normal, lungs clear to auscultation , no respiratory distress  CV:  regular rate and rhythm, no murmur, rub, or gallop, peripheral edema trace+ in LE bilaterally  ABDOMEN:  normal bowel sounds, soft, nontender, no hepatosplenomegaly or other masses  M/S:   patient sitting up on edge of bed  SKIN:  Inspection of skin and subcutaneous tissue baseline  NEURO:   speech wnl  PSYCH:  affect and mood normal    Lab/Diagnostic data:  Recent labs in [facility] reviewed by me today.  and Most Recent 3 CBC's:  Recent Labs   Lab Test 09/27/24  0609 09/26/24  1336 04/11/24  0608   WBC 7.3 6.7 9.1   HGB 12.4 12.6 10.3*    100 96    202 195     Most Recent 3 BMP's:  Recent Labs   Lab Test 09/27/24  0609 09/26/24  1336 05/13/24  1028    140 140   POTASSIUM 4.4 4.5 4.2   CHLORIDE 104 105 108*   CO2 30* 26 24   BUN 20.4 24.6* 21.1   CR 0.89 1.03* 1.11*   ANIONGAP 7 9 8   ARABELLA 9.9 10.1 10.0   * 104* 103*       ASSESSMENT/PLAN:    (M54.41) Acute bilateral low back pain with right-sided sciatica  (primary encounter diagnosis)  (M48.062) Spinal stenosis, lumbar region, with neurogenic claudication  (R53.81) Physical deconditioning  Comment: acute/ongoing  Plan: PT and OT, will have epidural steroid injection 10/2/24, schedule tylenol 1000mg TID and increase gabapentin to 100mg TID, continue cyclobenazaprine 10mg TID prn and hydromorphone 2mg q 4 hours prn    (I10) Benign essential hypertension  (I48.20) Chronic atrial fibrillation (H)  (N18.30) Stage 3 chronic kidney disease, unspecified whether stage 3a or 3b CKD (H)  Comment: acute/ongoing  Plan: BMP follow, continue diltiazem 240mg QD    (M06.9) Rheumatoid arthritis, involving unspecified site, unspecified whether rheumatoid factor present (H)  (M34.9) Scleroderma (H)  Comment: ongoing  Plan: PT and OT, continue pain medications as  above, f/u with rheumatology as OP    (K59.03) drug induced constipation  Acute/ongoing  Plan: senna s 1 PO BID scheduled, miralax 17gm QD prn      Orders:  Change tylenol to 1000mg TID scheduled  Increase gabapentin to 100mg TID  BMP and CBC on Thursday  Change senna s to 1 PO BID scheduled  Add miralax 17gm QD prn    Total time with patient visit: 46 minutes including discussions about the POC and care coordination with the patient. Greater than 50% of total time spent with counseling and coordinating care due to reviewed internal medicine progress notes, medication changes and lab and imaging results, discussed POC, medications, pain control and bowel medications with patient at bedside. .   Electronically signed by:  Tonya Lynn Haase, APRN CNP

## 2024-10-01 VITALS
WEIGHT: 131.7 LBS | HEIGHT: 63 IN | DIASTOLIC BLOOD PRESSURE: 55 MMHG | BODY MASS INDEX: 23.34 KG/M2 | SYSTOLIC BLOOD PRESSURE: 131 MMHG | RESPIRATION RATE: 17 BRPM | OXYGEN SATURATION: 95 % | TEMPERATURE: 97.8 F | HEART RATE: 78 BPM

## 2024-10-01 NOTE — PROGRESS NOTES
"[facility] GERIATRICS    Chief Complaint   Patient presents with    RECHECK     HPI:  Marcia Gary is a 88 year old  (1935), who is being seen today for an episodic care visit at: [facility] ([facility]) [25]. Today's concern is:   Low back pain/lumbar stenosis on exam today patient sitting up in w/c, alert, pleasant, states pain in right low back is \"worse\" today, rates pain as 9/10 at rest, pain does not seem to be radiating to right groin area today.   In therapy patient is walking up to 40 feet using a RW with CGA  HTN/atrial fib/CKD: /65, 97/59, 104/55 with HR 70 range, denies CP, palpitations, SOB  RA: offers no c/o joint pain at time of exam  Constipation states bowels are working     Allergies, and PMH/PSH reviewed in [facility] today.  REVIEW OF SYSTEMS:  10 point ROS of systems including Constitutional, Eyes, Respiratory, Cardiovascular, Gastroenterology, Genitourinary, Integumentary, Musculoskeletal, Psychiatric were all negative except for pertinent positives noted in my HPI.    Objective:   /55   Pulse 78   Temp 97.8  F (36.6  C)   Resp 17   Ht 1.6 m (5' 3\")   Wt 59.7 kg (131 lb 11.2 oz)   SpO2 95%   BMI 23.33 kg/m    GENERAL APPEARANCE:  Alert, in no distress  ENT:  Mouth and posterior oropharynx normal, moist mucous membranes, [place]  EYES:  EOM, conjunctivae, lids, pupils and irises normal, PERRL  RESP:  respiratory effort and palpation of chest normal, lungs clear to auscultation , no respiratory distress  CV:  regular rate and rhythm, no murmur, rub, or gallop, peripheral edema trace+ in LE bilaterally  ABDOMEN:  normal bowel sounds, soft, nontender, no hepatosplenomegaly or other masses  M/S:   patient sitting up in w/c  SKIN:  Inspection of skin and subcutaneous tissue baseline  NEURO:   speech wnl  PSYCH:  affect and mood normal    Recent labs in [facility] reviewed by me today.  and Most Recent 3 CBC's:  Recent Labs   Lab Test 09/27/24  0609 09/26/24  1336 04/11/24  0608 "   WBC 7.3 6.7 9.1   HGB 12.4 12.6 10.3*    100 96    202 195     Most Recent 3 BMP's:  Recent Labs   Lab Test 09/27/24  0609 09/26/24  1336 05/13/24  1028    140 140   POTASSIUM 4.4 4.5 4.2   CHLORIDE 104 105 108*   CO2 30* 26 24   BUN 20.4 24.6* 21.1   CR 0.89 1.03* 1.11*   ANIONGAP 7 9 8   ARABELLA 9.9 10.1 10.0   * 104* 103*       Assessment/Plan:  (M54.41) Acute bilateral low back pain with right-sided sciatica  (primary encounter diagnosis)  (M48.062) Spinal stenosis, lumbar region, with neurogenic claudication  (R53.81) Physical deconditioning  Comment: acute/ongoing, no change  Plan: PT and OT, will have epidural steroid injection today, continue tylenol 1000mg TID and gabapentin 100mg TID, continue cyclobenazaprine 10mg TID prn and hydromorphone 2mg q 4 hours prn     (I10) Benign essential hypertension  (I48.20) Chronic atrial fibrillation (H)  (N18.30) Stage 3 chronic kidney disease, unspecified whether stage 3a or 3b CKD (H)  Comment: acute/ongoing, no change  Plan: BMP follow, continue diltiazem 240mg QD     (M06.9) Rheumatoid arthritis, involving unspecified site, unspecified whether rheumatoid factor present (H)  (M34.9) Scleroderma (H)  Comment: ongoing no change  Plan: PT and OT, continue pain medications as above, f/u with rheumatology as OP     (K59.03) drug induced constipation  Acute/ongoing, no change  Plan: senna s 1 PO BID scheduled, miralax 17gm QD prn          MED REC REQUIRED  Post Medication Reconciliation Status: medication reconcilation previously completed during another office visit      Orders:  No new orders    Total time with patient visit: 47 minutes including discussions about the POC and care coordination with the patient. Greater than 50% of total time spent with counseling and coordinating care due to reviewed nursing and therapy progress notes, medications and POC. Discussed pain management and POC with patient at bedside, discussed POC with nursing at  facility.   Electronically signed by: Tonya Lynn Haase, APRN CNP

## 2024-10-02 ENCOUNTER — HOSPITAL ENCOUNTER (OUTPATIENT)
Dept: GENERAL RADIOLOGY | Facility: CLINIC | Age: 89
Discharge: HOME | End: 2024-10-02
Attending: INTERNAL MEDICINE | Admitting: PHYSICIAN ASSISTANT
Payer: MEDICARE

## 2024-10-02 ENCOUNTER — TRANSITIONAL CARE UNIT VISIT (OUTPATIENT)
Dept: GERIATRICS | Facility: CLINIC | Age: 89
End: 2024-10-02
Payer: MEDICARE

## 2024-10-02 VITALS
DIASTOLIC BLOOD PRESSURE: 62 MMHG | OXYGEN SATURATION: 98 % | HEART RATE: 78 BPM | SYSTOLIC BLOOD PRESSURE: 143 MMHG | RESPIRATION RATE: 16 BRPM

## 2024-10-02 DIAGNOSIS — M48.062 SPINAL STENOSIS, LUMBAR REGION, WITH NEUROGENIC CLAUDICATION: ICD-10-CM

## 2024-10-02 DIAGNOSIS — I48.20 CHRONIC ATRIAL FIBRILLATION (H): ICD-10-CM

## 2024-10-02 DIAGNOSIS — I10 BENIGN ESSENTIAL HYPERTENSION: ICD-10-CM

## 2024-10-02 DIAGNOSIS — N18.30 STAGE 3 CHRONIC KIDNEY DISEASE, UNSPECIFIED WHETHER STAGE 3A OR 3B CKD (H): ICD-10-CM

## 2024-10-02 DIAGNOSIS — M06.9 RHEUMATOID ARTHRITIS, INVOLVING UNSPECIFIED SITE, UNSPECIFIED WHETHER RHEUMATOID FACTOR PRESENT (H): ICD-10-CM

## 2024-10-02 DIAGNOSIS — M34.9 SCLERODERMA (H): ICD-10-CM

## 2024-10-02 DIAGNOSIS — M54.41 ACUTE BILATERAL LOW BACK PAIN WITH RIGHT-SIDED SCIATICA: Primary | ICD-10-CM

## 2024-10-02 DIAGNOSIS — R53.81 PHYSICAL DECONDITIONING: ICD-10-CM

## 2024-10-02 PROCEDURE — 255N000002 HC RX 255 OP 636: Performed by: PHYSICIAN ASSISTANT

## 2024-10-02 PROCEDURE — 99310 SBSQ NF CARE HIGH MDM 45: CPT | Performed by: NURSE PRACTITIONER

## 2024-10-02 PROCEDURE — 250N000009 HC RX 250: Performed by: INTERNAL MEDICINE

## 2024-10-02 PROCEDURE — 64483 NJX AA&/STRD TFRM EPI L/S 1: CPT

## 2024-10-02 PROCEDURE — 250N000011 HC RX IP 250 OP 636: Performed by: INTERNAL MEDICINE

## 2024-10-02 RX ORDER — DEXAMETHASONE SODIUM PHOSPHATE 10 MG/ML
INJECTION, SOLUTION INTRAMUSCULAR; INTRAVENOUS
Status: COMPLETED
Start: 2024-10-02 | End: 2024-10-02

## 2024-10-02 RX ORDER — IOPAMIDOL 408 MG/ML
1-10 INJECTION, SOLUTION INTRATHECAL ONCE
Status: COMPLETED | OUTPATIENT
Start: 2024-10-02 | End: 2024-10-02

## 2024-10-02 RX ORDER — LIDOCAINE HYDROCHLORIDE 10 MG/ML
INJECTION, SOLUTION EPIDURAL; INFILTRATION; INTRACAUDAL; PERINEURAL
Status: COMPLETED
Start: 2024-10-02 | End: 2024-10-02

## 2024-10-02 RX ADMIN — LIDOCAINE HYDROCHLORIDE 5 ML: 10 INJECTION, SOLUTION EPIDURAL; INFILTRATION; INTRACAUDAL; PERINEURAL at 16:19

## 2024-10-02 RX ADMIN — IOPAMIDOL 3 ML: 408 INJECTION, SOLUTION INTRATHECAL at 16:17

## 2024-10-02 RX ADMIN — DEXAMETHASONE SODIUM PHOSPHATE 10 MG: 10 INJECTION, SOLUTION INTRAMUSCULAR; INTRAVENOUS at 16:18

## 2024-10-02 NOTE — PROGRESS NOTES
RADIOLOGY PROCEDURE NOTE  Patient name: Marcia Gary  MRN: [id number]  : 1935    Pre-procedure diagnosis: Right hip pain with severe right foraminal stenosis  Post-procedure diagnosis: Same    Procedure Date/Time: 2024  4:23 PM  Procedure: Right L2-L3 TFESI.  Entered posterior superior foramen initially.  Venous uptake seen.  Repositioned laterally and superiorly. Perineural position but very tender and had to withdrawal the needle until flow achieved.  Placed in perispinous musculature.   Estimated blood loss: None  Specimen(s) collected with description: none  The patient tolerated the procedure well with no immediate complications.  Significant findings:none    See imaging dictation for procedural details.    Provider name: Oral Bradley PA-C  Assistant(s):None

## 2024-10-02 NOTE — LETTER
" 10/2/2024      Marcia Gary  [address] [address]        [facility]    Chief Complaint   Patient presents with     RECHECK     HPI:  Marcia Gary is a 88 year old  (1935), who is being seen today for an episodic care visit at: [facility]) [25]. Today's concern is:   Low back pain/lumbar stenosis on exam today patient sitting up in w/c, alert, pleasant, states pain in right low back is \"worse\" today, rates pain as 9/10 at rest, pain does not seem to be radiating to right groin area today.   In therapy patient is walking up to 40 feet using a RW with CGA  HTN/atrial fib/CKD: /65, 97/59, 104/55 with HR 70 range, denies CP, palpitations, SOB  RA: offers no c/o joint pain at time of exam  Constipation states bowels are working     Allergies, and PMH/PSH reviewed in [facility] today.  REVIEW OF SYSTEMS:  10 point ROS of systems including Constitutional, Eyes, Respiratory, Cardiovascular, Gastroenterology, Genitourinary, Integumentary, Musculoskeletal, Psychiatric were all negative except for pertinent positives noted in my HPI.    Objective:   /55   Pulse 78   Temp 97.8  F (36.6  C)   Resp 17   Ht 1.6 m (5' 3\")   Wt 59.7 kg (131 lb 11.2 oz)   SpO2 95%   BMI 23.33 kg/m    GENERAL APPEARANCE:  Alert, in no distress  ENT:  Mouth and posterior oropharynx normal, moist mucous membranes, [place]  EYES:  EOM, conjunctivae, lids, pupils and irises normal, PERRL  RESP:  respiratory effort and palpation of chest normal, lungs clear to auscultation , no respiratory distress  CV:  regular rate and rhythm, no murmur, rub, or gallop, peripheral edema trace+ in LE bilaterally  ABDOMEN:  normal bowel sounds, soft, nontender, no hepatosplenomegaly or other masses  M/S:   patient sitting up in w/c  SKIN:  Inspection of skin and subcutaneous tissue baseline  NEURO:   speech wnl  PSYCH:  affect and mood normal    Recent labs in [facility] reviewed by me today.  and Most " Recent 3 CBC's:  Recent Labs   Lab Test 09/27/24  0609 09/26/24  1336 04/11/24  0608   WBC 7.3 6.7 9.1   HGB 12.4 12.6 10.3*    100 96    202 195     Most Recent 3 BMP's:  Recent Labs   Lab Test 09/27/24  0609 09/26/24  1336 05/13/24  1028    140 140   POTASSIUM 4.4 4.5 4.2   CHLORIDE 104 105 108*   CO2 30* 26 24   BUN 20.4 24.6* 21.1   CR 0.89 1.03* 1.11*   ANIONGAP 7 9 8   ARABELLA 9.9 10.1 10.0   * 104* 103*       Assessment/Plan:  (M54.41) Acute bilateral low back pain with right-sided sciatica  (primary encounter diagnosis)  (M48.062) Spinal stenosis, lumbar region, with neurogenic claudication  (R53.81) Physical deconditioning  Comment: acute/ongoing, no change  Plan: PT and OT, will have epidural steroid injection today, continue tylenol 1000mg TID and gabapentin 100mg TID, continue cyclobenazaprine 10mg TID prn and hydromorphone 2mg q 4 hours prn     (I10) Benign essential hypertension  (I48.20) Chronic atrial fibrillation (H)  (N18.30) Stage 3 chronic kidney disease, unspecified whether stage 3a or 3b CKD (H)  Comment: acute/ongoing, no change  Plan: BMP follow, continue diltiazem 240mg QD     (M06.9) Rheumatoid arthritis, involving unspecified site, unspecified whether rheumatoid factor present (H)  (M34.9) Scleroderma (H)  Comment: ongoing no change  Plan: PT and OT, continue pain medications as above, f/u with rheumatology as OP     (K59.03) drug induced constipation  Acute/ongoing, no change  Plan: senna s 1 PO BID scheduled, miralax 17gm QD prn          MED REC REQUIRED  Post Medication Reconciliation Status: medication reconcilation previously completed during another office visit      Orders:  No new orders    Total time with patient visit: 47 minutes including discussions about the POC and care coordination with the patient. Greater than 50% of total time spent with counseling and coordinating care due to reviewed nursing and therapy progress notes, medications and POC.  Discussed pain management and POC with patient at bedside, discussed POC with nursing at facility.   Electronically signed by: Tonya Lynn Haase, APRN CNP         Sincerely,        Tonya Lynn Haase, APRN CNP

## 2024-10-02 NOTE — PROGRESS NOTES
Assisted Oral Bradley in lumbar epidural steriod injection. Medications given per MAR. Pain prior to procedure 6/10 at low back. Pain post procedure 6/10 at low back. Pt tolerated procedure well. Injection site covered with dressing. Dressing clean, dry and intact at time of discharge. Discharge instructions given and all questions answered. Pt left ambulatory in stable condition.

## 2024-10-03 ENCOUNTER — TELEPHONE (OUTPATIENT)
Dept: GERIATRICS | Facility: CLINIC | Age: 89
End: 2024-10-03
Payer: MEDICARE

## 2024-10-03 NOTE — TELEPHONE ENCOUNTER
[facility] Lab Note     Provider: Tonya Haase, APRN CNP  Facility: [facility] Facility Type:  TCU    Allergies   Allergen Reactions    Smz-Tmp Ds [Sulfamethoxazole-Trimethoprim] Rash     Itching. (800/160 mg twice day)    Metoprolol Hives    Amoxicillin     Amoxicillin-Pot Clavulanate     Cephalexin     Clavulanic Acid Other (See Comments)     Other reaction(s): Adverse reaction    Sulfamethoxazole     Trimethoprim     Doxycycline Nausea     Headaches        Labs Reviewed by provider: CBC and BMP    Wt today is 134.3(after breakfast) , 130.6, 131, 132.5  No cough or SOB, no edema, LS clear       Verbal Order/Direction given by Provider: NNO    Provider giving Order:  Tonya Haase, APRN CNP    Verbal Order given to: Kb Kahn RN

## 2024-10-04 ENCOUNTER — TRANSITIONAL CARE UNIT VISIT (OUTPATIENT)
Dept: GERIATRICS | Facility: CLINIC | Age: 89
End: 2024-10-04
Payer: MEDICARE

## 2024-10-04 VITALS
OXYGEN SATURATION: 99 % | RESPIRATION RATE: 17 BRPM | SYSTOLIC BLOOD PRESSURE: 127 MMHG | HEART RATE: 80 BPM | HEIGHT: 63 IN | TEMPERATURE: 98.9 F | WEIGHT: 134 LBS | DIASTOLIC BLOOD PRESSURE: 71 MMHG | BODY MASS INDEX: 23.74 KG/M2

## 2024-10-04 DIAGNOSIS — M54.41 ACUTE BILATERAL LOW BACK PAIN WITH RIGHT-SIDED SCIATICA: Primary | ICD-10-CM

## 2024-10-04 DIAGNOSIS — M34.9 SCLERODERMA (H): ICD-10-CM

## 2024-10-04 DIAGNOSIS — N18.30 STAGE 3 CHRONIC KIDNEY DISEASE, UNSPECIFIED WHETHER STAGE 3A OR 3B CKD (H): ICD-10-CM

## 2024-10-04 DIAGNOSIS — I10 BENIGN ESSENTIAL HYPERTENSION: ICD-10-CM

## 2024-10-04 DIAGNOSIS — M48.062 SPINAL STENOSIS, LUMBAR REGION, WITH NEUROGENIC CLAUDICATION: ICD-10-CM

## 2024-10-04 DIAGNOSIS — I48.20 CHRONIC ATRIAL FIBRILLATION (H): ICD-10-CM

## 2024-10-04 DIAGNOSIS — R53.81 PHYSICAL DECONDITIONING: ICD-10-CM

## 2024-10-04 DIAGNOSIS — M06.9 RHEUMATOID ARTHRITIS, INVOLVING UNSPECIFIED SITE, UNSPECIFIED WHETHER RHEUMATOID FACTOR PRESENT (H): ICD-10-CM

## 2024-10-04 PROCEDURE — 99310 SBSQ NF CARE HIGH MDM 45: CPT | Performed by: NURSE PRACTITIONER

## 2024-10-04 NOTE — PROGRESS NOTES
"[facility] GERIATRICS    Chief Complaint   Patient presents with    RECHECK     HPI:  Marcia Gary is a 88 year old  (1935), who is being seen today for an episodic care visit at: [facility] ([facility]) [25]. Today's concern is:   Low back pain/lumbar stenosis on exam today patient sitting up on the edge of her bed alert, pleasant, states pain is better today, rates pain as 2/10 in right thigh, denies pain to right hip, low back or right groin area, feels pain has improved.   In therapy patient is walking up to 50 feet using a RW with CGA  HTN/atrial fib/CKD: /71, 134/77, 131/65 with HR 70-80 range, denies CP, palpitations, SOB  RA: offers no c/o joint pain at time of exam  Constipation states bowels are working    Allergies, and PMH/PSH reviewed in [facility] today.  REVIEW OF SYSTEMS:  10 point ROS of systems including Constitutional, Eyes, Respiratory, Cardiovascular, Gastroenterology, Genitourinary, Integumentary, Musculoskeletal, Psychiatric were all negative except for pertinent positives noted in my HPI.    Objective:   /71   Pulse 80   Temp 98.9  F (37.2  C)   Resp 17   Ht 1.6 m (5' 3\")   Wt 60.8 kg (134 lb)   SpO2 99%   BMI 23.74 kg/m    GENERAL APPEARANCE:  Alert, in no distress  ENT:  Mouth and posterior oropharynx normal, moist mucous membranes, [place]  EYES:  EOM, conjunctivae, lids, pupils and irises normal, PERRL  RESP:  respiratory effort and palpation of chest normal, lungs clear to auscultation , no respiratory distress  CV:  regular rate and rhythm, no murmur, rub, or gallop, peripheral edema trace+ in LE bilaterally  ABDOMEN:  normal bowel sounds, soft, nontender, no hepatosplenomegaly or other masses  M/S:   patient sitting up in w/c  SKIN:  Inspection of skin and subcutaneous tissue baseline  NEURO:   speech wnl  PSYCH:  affect and mood normal    Recent labs in [facility] reviewed by me today.  and Most Recent 3 CBC's:  Recent Labs   Lab Test 09/27/24  0609 09/26/24  1336 " 04/11/24  0608   WBC 7.3 6.7 9.1   HGB 12.4 12.6 10.3*    100 96    202 195     Most Recent 3 BMP's:  Recent Labs   Lab Test 09/27/24  0609 09/26/24  1336 05/13/24  1028    140 140   POTASSIUM 4.4 4.5 4.2   CHLORIDE 104 105 108*   CO2 30* 26 24   BUN 20.4 24.6* 21.1   CR 0.89 1.03* 1.11*   ANIONGAP 7 9 8   ARABELLA 9.9 10.1 10.0   * 104* 103*       Assessment/Plan:  (M54.41) Acute bilateral low back pain with right-sided sciatica  (primary encounter diagnosis)  (M48.062) Spinal stenosis, lumbar region, with neurogenic claudication  (R53.81) Physical deconditioning  Comment: acute/ongoing, no change  Plan: PT and OT,epidural steroid injection 10/2/24, continue tylenol 1000mg TID and gabapentin 100mg TID, continue cyclobenazaprine 10mg TID prn and hydromorphone 2mg q 4 hours prn     (I10) Benign essential hypertension  (I48.20) Chronic atrial fibrillation (H)  (N18.30) Stage 3 chronic kidney disease, unspecified whether stage 3a or 3b CKD (H)  Comment: acute/ongoing, no change  Plan: BMP follow, continue diltiazem 240mg QD     (M06.9) Rheumatoid arthritis, involving unspecified site, unspecified whether rheumatoid factor present (H)  (M34.9) Scleroderma (H)  Comment: ongoing no change  Plan: PT and OT, continue pain medications as above, f/u with rheumatology as OP     (K59.03) drug induced constipation  Acute/ongoing, no change  Plan: senna s 1 PO BID scheduled, miralax 17gm QD prn       MED REC REQUIRED  Post Medication Reconciliation Status: medication reconcilation previously completed during another office visit      Orders:  No new orders    Total time with patient visit: 46 minutes including discussions about the POC and care coordination with the patient. Greater than 50% of total time spent with counseling and coordinating care due to reviewed nursing and therapy progress notes, medications and POC. Discussed pain control with patient and family at bedside. Discussed POC and medications  with nursing at facility.   Electronically signed by: Tonya Lynn Haase, APRN CNP

## 2024-10-04 NOTE — LETTER
" 10/4/2024      Marcia Gary  [address] [id number]        [facility] GERIATRICS    Chief Complaint   Patient presents with     RECHECK     HPI:  Marcia Gary is a 88 year old  (1935), who is being seen today for an episodic care visit at: [facility]) [25]. Today's concern is:   Low back pain/lumbar stenosis on exam today patient sitting up on the edge of her bed alert, pleasant, states pain is better today, rates pain as 2/10 in right thigh, denies pain to right hip, low back or right groin area, feels pain has improved.   In therapy patient is walking up to 50 feet using a RW with CGA  HTN/atrial fib/CKD: /71, 134/77, 131/65 with HR 70-80 range, denies CP, palpitations, SOB  RA: offers no c/o joint pain at time of exam  Constipation states bowels are working    Allergies, and PMH/PSH reviewed in [facility] today.  REVIEW OF SYSTEMS:  10 point ROS of systems including Constitutional, Eyes, Respiratory, Cardiovascular, Gastroenterology, Genitourinary, Integumentary, Musculoskeletal, Psychiatric were all negative except for pertinent positives noted in my HPI.    Objective:   /71   Pulse 80   Temp 98.9  F (37.2  C)   Resp 17   Ht 1.6 m (5' 3\")   Wt 60.8 kg (134 lb)   SpO2 99%   BMI 23.74 kg/m    GENERAL APPEARANCE:  Alert, in no distress  ENT:  Mouth and posterior oropharynx normal, moist mucous membranes, [place]  EYES:  EOM, conjunctivae, lids, pupils and irises normal, PERRL  RESP:  respiratory effort and palpation of chest normal, lungs clear to auscultation , no respiratory distress  CV:  regular rate and rhythm, no murmur, rub, or gallop, peripheral edema trace+ in LE bilaterally  ABDOMEN:  normal bowel sounds, soft, nontender, no hepatosplenomegaly or other masses  M/S:   patient sitting up in w/c  SKIN:  Inspection of skin and subcutaneous tissue baseline  NEURO:   speech wnl  PSYCH:  affect and mood normal    Recent labs in [facility] reviewed by me " today.  and Most Recent 3 CBC's:  Recent Labs   Lab Test 09/27/24  0609 09/26/24  1336 04/11/24  0608   WBC 7.3 6.7 9.1   HGB 12.4 12.6 10.3*    100 96    202 195     Most Recent 3 BMP's:  Recent Labs   Lab Test 09/27/24  0609 09/26/24  1336 05/13/24  1028    140 140   POTASSIUM 4.4 4.5 4.2   CHLORIDE 104 105 108*   CO2 30* 26 24   BUN 20.4 24.6* 21.1   CR 0.89 1.03* 1.11*   ANIONGAP 7 9 8   ARABELLA 9.9 10.1 10.0   * 104* 103*       Assessment/Plan:  (M54.41) Acute bilateral low back pain with right-sided sciatica  (primary encounter diagnosis)  (M48.062) Spinal stenosis, lumbar region, with neurogenic claudication  (R53.81) Physical deconditioning  Comment: acute/ongoing, no change  Plan: PT and OT,epidural steroid injection 10/2/24, continue tylenol 1000mg TID and gabapentin 100mg TID, continue cyclobenazaprine 10mg TID prn and hydromorphone 2mg q 4 hours prn     (I10) Benign essential hypertension  (I48.20) Chronic atrial fibrillation (H)  (N18.30) Stage 3 chronic kidney disease, unspecified whether stage 3a or 3b CKD (H)  Comment: acute/ongoing, no change  Plan: BMP follow, continue diltiazem 240mg QD     (M06.9) Rheumatoid arthritis, involving unspecified site, unspecified whether rheumatoid factor present (H)  (M34.9) Scleroderma (H)  Comment: ongoing no change  Plan: PT and OT, continue pain medications as above, f/u with rheumatology as OP     (K59.03) drug induced constipation  Acute/ongoing, no change  Plan: senna s 1 PO BID scheduled, miralax 17gm QD prn       MED REC REQUIRED  Post Medication Reconciliation Status: medication reconcilation previously completed during another office visit      Orders:  No new orders    Total time with patient visit: 46 minutes including discussions about the POC and care coordination with the patient. Greater than 50% of total time spent with counseling and coordinating care due to reviewed nursing and therapy progress notes, medications and POC.  Discussed pain control with patient and family at bedside. Discussed POC and medications with nursing at facility.   Electronically signed by: Tonya Lynn Haase, APRN CNP         Sincerely,        Tonya Lynn Haase, APRN CNP

## 2024-10-08 ENCOUNTER — TRANSITIONAL CARE UNIT VISIT (OUTPATIENT)
Dept: GERIATRICS | Facility: CLINIC | Age: 89
End: 2024-10-08
Payer: MEDICARE

## 2024-10-08 VITALS
RESPIRATION RATE: 16 BRPM | DIASTOLIC BLOOD PRESSURE: 72 MMHG | OXYGEN SATURATION: 92 % | BODY MASS INDEX: 23.74 KG/M2 | SYSTOLIC BLOOD PRESSURE: 126 MMHG | HEIGHT: 63 IN | HEART RATE: 80 BPM | TEMPERATURE: 98.5 F | WEIGHT: 134 LBS

## 2024-10-08 DIAGNOSIS — I10 BENIGN ESSENTIAL HYPERTENSION: ICD-10-CM

## 2024-10-08 DIAGNOSIS — M34.9 SCLERODERMA (H): ICD-10-CM

## 2024-10-08 DIAGNOSIS — M54.41 ACUTE BILATERAL LOW BACK PAIN WITH RIGHT-SIDED SCIATICA: Primary | ICD-10-CM

## 2024-10-08 DIAGNOSIS — I48.20 CHRONIC ATRIAL FIBRILLATION (H): ICD-10-CM

## 2024-10-08 DIAGNOSIS — N18.30 STAGE 3 CHRONIC KIDNEY DISEASE, UNSPECIFIED WHETHER STAGE 3A OR 3B CKD (H): ICD-10-CM

## 2024-10-08 DIAGNOSIS — M06.9 RHEUMATOID ARTHRITIS, INVOLVING UNSPECIFIED SITE, UNSPECIFIED WHETHER RHEUMATOID FACTOR PRESENT (H): ICD-10-CM

## 2024-10-08 DIAGNOSIS — R53.81 PHYSICAL DECONDITIONING: ICD-10-CM

## 2024-10-08 DIAGNOSIS — M48.062 SPINAL STENOSIS, LUMBAR REGION, WITH NEUROGENIC CLAUDICATION: ICD-10-CM

## 2024-10-08 PROCEDURE — 99310 SBSQ NF CARE HIGH MDM 45: CPT | Performed by: NURSE PRACTITIONER

## 2024-10-08 NOTE — LETTER
" 10/8/2024      Marcia Gary  [address] [id number]        [facility] GERIATRICS    Chief Complaint   Patient presents with     RECHECK     HPI:  Marcia Gary is a 88 year old  (1935), who is being seen today for an episodic care visit at: [facility]) [25]. Today's concern is:   Low back pain/lumbar stenosis on exam today patient sitting up on the edge of bed, alert, pleasant, states she is working with therapy and making progress, low back and hip pain is much improved, c/o right anterior thigh pain states she has a burning sensation, seems to be worse at night, she did use ice last night with good effect,   In therapy patient is walking up to 100 feet using a RW with CGA  HTN/atrial fib/CKD: /68, 127/67, 119/60  with HR 80 range, denies CP, palpitations, SOB  RA: offers no c/o joint pain at time of exam  Constipation states bowels are working    Allergies, and PMH/PSH reviewed in [facility] today.  REVIEW OF SYSTEMS:  10 point ROS of systems including Constitutional, Eyes, Respiratory, Cardiovascular, Gastroenterology, Genitourinary, Integumentary, Musculoskeletal, Psychiatric were all negative except for pertinent positives noted in my HPI.    Objective:   /72   Pulse 80   Temp 98.5  F (36.9  C)   Resp 16   Ht 1.6 m (5' 3\")   Wt 60.8 kg (134 lb)   SpO2 92%   BMI 23.74 kg/m    GENERAL APPEARANCE:  Alert, in no distress  ENT:  Mouth and posterior oropharynx normal, moist mucous membranes, [place]  EYES:  EOM, conjunctivae, lids, pupils and irises normal, PERRL  RESP:  respiratory effort and palpation of chest normal, lungs clear to auscultation , no respiratory distress  CV:  regular rate and rhythm, no murmur, rub, or gallop, no edema  ABDOMEN:  normal bowel sounds, soft, nontender, no hepatosplenomegaly or other masses  M/S:   patient sitting up on edge of bed  SKIN:  Inspection of skin and subcutaneous tissue baseline  NEURO:   speech wnl  PSYCH:  " affect and mood normal    Recent labs in [facility] reviewed by me today.  and Most Recent 3 CBC's:  Recent Labs   Lab Test 09/27/24  0609 09/26/24  1336 04/11/24  0608   WBC 7.3 6.7 9.1   HGB 12.4 12.6 10.3*    100 96    202 195     Most Recent 3 BMP's:  Recent Labs   Lab Test 09/27/24  0609 09/26/24  1336 05/13/24  1028    140 140   POTASSIUM 4.4 4.5 4.2   CHLORIDE 104 105 108*   CO2 30* 26 24   BUN 20.4 24.6* 21.1   CR 0.89 1.03* 1.11*   ANIONGAP 7 9 8   ARABELLA 9.9 10.1 10.0   * 104* 103*       Assessment/Plan:  (M54.41) Acute bilateral low back pain with right-sided sciatica  (primary encounter diagnosis)  (M48.062) Spinal stenosis, lumbar region, with neurogenic claudication  (R53.81) Physical deconditioning  Comment: acute/ongoing, no change  Plan: PT and OT,epidural steroid injection 10/2/24, continue tylenol 1000mg TID and gabapentin 100mg TID, continue cyclobenazaprine 10mg TID prn and hydromorphone 2mg q 4 hours prn     (I10) Benign essential hypertension  (I48.20) Chronic atrial fibrillation (H)  (N18.30) Stage 3 chronic kidney disease, unspecified whether stage 3a or 3b CKD (H)  Comment: acute/ongoing, no change  Plan: BMP follow, continue diltiazem 240mg QD     (M06.9) Rheumatoid arthritis, involving unspecified site, unspecified whether rheumatoid factor present (H)  (M34.9) Scleroderma (H)  Comment: ongoing no change  Plan: PT and OT, continue pain medications as above, f/u with rheumatology as OP     (K59.03) drug induced constipation  Acute/ongoing, no change  Plan: senna s 1 PO BID scheduled, miralax 17gm QD prn          MED REC REQUIRED  Post Medication Reconciliation Status: medication reconcilation previously completed during another office visit      Orders:  No new orders    Total time with patient visit: 46 minutes including discussions about the POC and care coordination with the patient. Greater than 50% of total time spent with counseling and coordinating care due to  reviewed nursing and therapy progress notes, medications and POC, lab results and discharge planning. Discussed pain management and progress in therapy with patient at bedside. .   Electronically signed by: Tonya Lynn Haase, APRN CNP         Sincerely,        Tonya Lynn Haase, APRN CNP

## 2024-10-08 NOTE — PROGRESS NOTES
"[facility] GERIATRICS    Chief Complaint   Patient presents with    RECHECK     HPI:  Marcia Gary is a 88 year old  (1935), who is being seen today for an episodic care visit at: [facility] ([facility]) [25]. Today's concern is:   Low back pain/lumbar stenosis on exam today patient sitting up on the edge of bed, alert, pleasant, states she is working with therapy and making progress, low back and hip pain is much improved, c/o right anterior thigh pain states she has a burning sensation, seems to be worse at night, she did use ice last night with good effect,   In therapy patient is walking up to 100 feet using a RW with CGA  HTN/atrial fib/CKD: /68, 127/67, 119/60  with HR 80 range, denies CP, palpitations, SOB  RA: offers no c/o joint pain at time of exam  Constipation states bowels are working    Allergies, and PMH/PSH reviewed in [facility] today.  REVIEW OF SYSTEMS:  10 point ROS of systems including Constitutional, Eyes, Respiratory, Cardiovascular, Gastroenterology, Genitourinary, Integumentary, Musculoskeletal, Psychiatric were all negative except for pertinent positives noted in my HPI.    Objective:   /72   Pulse 80   Temp 98.5  F (36.9  C)   Resp 16   Ht 1.6 m (5' 3\")   Wt 60.8 kg (134 lb)   SpO2 92%   BMI 23.74 kg/m    GENERAL APPEARANCE:  Alert, in no distress  ENT:  Mouth and posterior oropharynx normal, moist mucous membranes, [place]  EYES:  EOM, conjunctivae, lids, pupils and irises normal, PERRL  RESP:  respiratory effort and palpation of chest normal, lungs clear to auscultation , no respiratory distress  CV:  regular rate and rhythm, no murmur, rub, or gallop, no edema  ABDOMEN:  normal bowel sounds, soft, nontender, no hepatosplenomegaly or other masses  M/S:   patient sitting up on edge of bed  SKIN:  Inspection of skin and subcutaneous tissue baseline  NEURO:   speech wnl  PSYCH:  affect and mood normal    Recent labs in [facility] reviewed by me today.  and Most Recent 3 " CBC's:  Recent Labs   Lab Test 09/27/24  0609 09/26/24  1336 04/11/24  0608   WBC 7.3 6.7 9.1   HGB 12.4 12.6 10.3*    100 96    202 195     Most Recent 3 BMP's:  Recent Labs   Lab Test 09/27/24  0609 09/26/24  1336 05/13/24  1028    140 140   POTASSIUM 4.4 4.5 4.2   CHLORIDE 104 105 108*   CO2 30* 26 24   BUN 20.4 24.6* 21.1   CR 0.89 1.03* 1.11*   ANIONGAP 7 9 8   ARABELLA 9.9 10.1 10.0   * 104* 103*       Assessment/Plan:  (M54.41) Acute bilateral low back pain with right-sided sciatica  (primary encounter diagnosis)  (M48.062) Spinal stenosis, lumbar region, with neurogenic claudication  (R53.81) Physical deconditioning  Comment: acute/ongoing, no change  Plan: PT and OT,epidural steroid injection 10/2/24, continue tylenol 1000mg TID and gabapentin 100mg TID, continue cyclobenazaprine 10mg TID prn and hydromorphone 2mg q 4 hours prn     (I10) Benign essential hypertension  (I48.20) Chronic atrial fibrillation (H)  (N18.30) Stage 3 chronic kidney disease, unspecified whether stage 3a or 3b CKD (H)  Comment: acute/ongoing, no change  Plan: BMP follow, continue diltiazem 240mg QD     (M06.9) Rheumatoid arthritis, involving unspecified site, unspecified whether rheumatoid factor present (H)  (M34.9) Scleroderma (H)  Comment: ongoing no change  Plan: PT and OT, continue pain medications as above, f/u with rheumatology as OP     (K59.03) drug induced constipation  Acute/ongoing, no change  Plan: senna s 1 PO BID scheduled, miralax 17gm QD prn          MED REC REQUIRED  Post Medication Reconciliation Status: medication reconcilation previously completed during another office visit      Orders:  No new orders    Total time with patient visit: 46 minutes including discussions about the POC and care coordination with the patient. Greater than 50% of total time spent with counseling and coordinating care due to reviewed nursing and therapy progress notes, medications and POC, lab results and discharge  planning. Discussed pain management and progress in therapy with patient at bedside. .   Electronically signed by: Tonya Lynn Haase, APRN CNP

## 2024-10-14 ENCOUNTER — TRANSITIONAL CARE UNIT VISIT (OUTPATIENT)
Dept: GERIATRICS | Facility: CLINIC | Age: 89
End: 2024-10-14
Payer: MEDICARE

## 2024-10-14 VITALS
HEIGHT: 63 IN | TEMPERATURE: 99 F | OXYGEN SATURATION: 98 % | RESPIRATION RATE: 18 BRPM | HEART RATE: 88 BPM | DIASTOLIC BLOOD PRESSURE: 69 MMHG | SYSTOLIC BLOOD PRESSURE: 104 MMHG | WEIGHT: 134 LBS | BODY MASS INDEX: 23.74 KG/M2

## 2024-10-14 DIAGNOSIS — R53.81 PHYSICAL DECONDITIONING: ICD-10-CM

## 2024-10-14 DIAGNOSIS — M06.9 RHEUMATOID ARTHRITIS, INVOLVING UNSPECIFIED SITE, UNSPECIFIED WHETHER RHEUMATOID FACTOR PRESENT (H): ICD-10-CM

## 2024-10-14 DIAGNOSIS — I48.20 CHRONIC ATRIAL FIBRILLATION (H): ICD-10-CM

## 2024-10-14 DIAGNOSIS — N18.30 STAGE 3 CHRONIC KIDNEY DISEASE, UNSPECIFIED WHETHER STAGE 3A OR 3B CKD (H): ICD-10-CM

## 2024-10-14 DIAGNOSIS — M34.9 SCLERODERMA (H): ICD-10-CM

## 2024-10-14 DIAGNOSIS — M54.41 ACUTE BILATERAL LOW BACK PAIN WITH RIGHT-SIDED SCIATICA: Primary | ICD-10-CM

## 2024-10-14 DIAGNOSIS — M48.062 SPINAL STENOSIS, LUMBAR REGION, WITH NEUROGENIC CLAUDICATION: ICD-10-CM

## 2024-10-14 DIAGNOSIS — I10 BENIGN ESSENTIAL HYPERTENSION: ICD-10-CM

## 2024-10-14 PROCEDURE — 99310 SBSQ NF CARE HIGH MDM 45: CPT | Performed by: NURSE PRACTITIONER

## 2024-10-14 NOTE — PROGRESS NOTES
"[facility] GERIATRICS    Chief Complaint   Patient presents with    RECHECK     HPI:  Marcia Gary is a 88 year old  (1935), who is being seen today for an episodic care visit at: [facility] ([facility]) [25]. Today's concern is:   Low back pain/lumbar stenosis on exam today patient is sitting up on edge of bed, states she just finished a long walk, having some aching in low back and continues to have right thigh pain which seems to be minimal   In therapy patient is walking > 500 feet using a RW with SBA  HTN/atrial fib/CKD: /69, 110/62, 96/58   with HR 80 range, denies CP, palpitations, SOB  RA: offers no c/o joint pain at time of exam  Constipation states bowels are working       Allergies, and PMH/PSH reviewed in [facility] today.  REVIEW OF SYSTEMS:  10 point ROS of systems including Constitutional, Eyes, Respiratory, Cardiovascular, Gastroenterology, Genitourinary, Integumentary, Musculoskeletal, Psychiatric were all negative except for pertinent positives noted in my HPI.    Objective:   /69   Pulse 88   Temp 99  F (37.2  C)   Resp 18   Ht 1.6 m (5' 3\")   Wt 60.8 kg (134 lb)   SpO2 98%   BMI 23.74 kg/m    GENERAL APPEARANCE:  Alert, in no distress  ENT:  Mouth and posterior oropharynx normal, moist mucous membranes, normal hearing acuity  EYES:  EOM, conjunctivae, lids, pupils and irises normal, PERRL  RESP:  respiratory effort and palpation of chest normal, lungs clear to auscultation , no respiratory distress  CV:  regular rate and rhythm, no murmur, rub, or gallop, no edema  ABDOMEN:  normal bowel sounds, soft, nontender, no hepatosplenomegaly or other masses  M/S:   patient sitting up on edge of bed  SKIN:  Inspection of skin and subcutaneous tissue baseline  NEURO:   speech wnl  PSYCH:  affect and mood normal    Recent labs in [facility] reviewed by me today.  and Most Recent 3 CBC's:  Recent Labs   Lab Test 09/27/24  0609 09/26/24  1336 04/11/24  0608   WBC 7.3 6.7 9.1   HGB 12.4 " 12.6 10.3*    100 96    202 195     Most Recent 3 BMP's:  Recent Labs   Lab Test 09/27/24  0609 09/26/24  1336 05/13/24  1028    140 140   POTASSIUM 4.4 4.5 4.2   CHLORIDE 104 105 108*   CO2 30* 26 24   BUN 20.4 24.6* 21.1   CR 0.89 1.03* 1.11*   ANIONGAP 7 9 8   ARABELLA 9.9 10.1 10.0   * 104* 103*       Assessment/Plan:  (M54.41) Acute bilateral low back pain with right-sided sciatica  (primary encounter diagnosis)  (M48.062) Spinal stenosis, lumbar region, with neurogenic claudication  (R53.81) Physical deconditioning  Comment: acute/ongoing, no change  Plan: PT and OT,epidural steroid injection 10/2/24, continue tylenol 1000mg TID and gabapentin 100mg TID, hydromorphone 2mg q 4 hours prn using approx once per day  DC cyclobenezeprine     (I10) Benign essential hypertension  (I48.20) Chronic atrial fibrillation (H)  (N18.30) Stage 3 chronic kidney disease, unspecified whether stage 3a or 3b CKD (H)  Comment: acute/ongoing, no change  Plan: BMP follow, continue diltiazem 240mg QD     (M06.9) Rheumatoid arthritis, involving unspecified site, unspecified whether rheumatoid factor present (H)  (M34.9) Scleroderma (H)  Comment: ongoing no change  Plan: PT and OT, continue pain medications as above, f/u with rheumatology as OP     (K59.03) drug induced constipation  Acute/ongoing, no change  Plan: senna s 1 PO BID scheduled, miralax 17gm QD prn       MED REC REQUIRED\F2  Post Medication Reconciliation Status: medication reconcilation previously completed during another office visit      Orders:  No new orders    Total time with patient visit: 48 minutes including discussions about the POC and care coordination with the patient. Greater than 50% of total time spent with counseling and coordinating care due to reviewed nursing and therapy progress notes, medications and lab results. Discussed pain control and discharge plan with patient at bedside.   Electronically signed by: Tonya Lynn Haase, APRN  CNP

## 2024-10-14 NOTE — LETTER
" 10/14/2024      Marcia Gary  [address] [address]        [facility]    Chief Complaint   Patient presents with     RECHECK     HPI:  Marcia Gary is a 88 year old  (1935), who is being seen today for an episodic care visit at: [facility]) [25]. Today's concern is:   Low back pain/lumbar stenosis on exam today patient is sitting up on edge of bed, states she just finished a long walk, having some aching in low back and continues to have right thigh pain which seems to be minimal   In therapy patient is walking > 500 feet using a RW with SBA  HTN/atrial fib/CKD: /69, 110/62, 96/58   with HR 80 range, denies CP, palpitations, SOB  RA: offers no c/o joint pain at time of exam  Constipation states bowels are working       Allergies, and PMH/PSH [facility] today.  REVIEW OF SYSTEMS:  10 point ROS of systems including Constitutional, Eyes, Respiratory, Cardiovascular, Gastroenterology, Genitourinary, Integumentary, Musculoskeletal, Psychiatric were all negative except for pertinent positives noted in my HPI.    Objective:   /69   Pulse 88   Temp 99  F (37.2  C)   Resp 18   Ht 1.6 m (5' 3\")   Wt 60.8 kg (134 lb)   SpO2 98%   BMI 23.74 kg/m    GENERAL APPEARANCE:  Alert, in no distress  ENT:  Mouth and posterior oropharynx normal, moist mucous membranes, normal hearing acuity  EYES:  EOM, conjunctivae, lids, pupils and irises normal, PERRL  RESP:  respiratory effort and palpation of chest normal, lungs clear to auscultation , no respiratory distress  CV:  regular rate and rhythm, no murmur, rub, or gallop, no edema  ABDOMEN:  normal bowel sounds, soft, nontender, no hepatosplenomegaly or other masses  M/S:   patient sitting up on edge of bed  SKIN:  Inspection of skin and subcutaneous tissue baseline  NEURO:   speech wnl  PSYCH:  affect and mood normal    Recent labs in [facility] reviewed by me today.  and Most Recent 3 CBC's:  Recent Labs "   Lab Test 09/27/24  0609 09/26/24  1336 04/11/24  0608   WBC 7.3 6.7 9.1   HGB 12.4 12.6 10.3*    100 96    202 195     Most Recent 3 BMP's:  Recent Labs   Lab Test 09/27/24  0609 09/26/24  1336 05/13/24  1028    140 140   POTASSIUM 4.4 4.5 4.2   CHLORIDE 104 105 108*   CO2 30* 26 24   BUN 20.4 24.6* 21.1   CR 0.89 1.03* 1.11*   ANIONGAP 7 9 8   ARABELLA 9.9 10.1 10.0   * 104* 103*       Assessment/Plan:  (M54.41) Acute bilateral low back pain with right-sided sciatica  (primary encounter diagnosis)  (M48.062) Spinal stenosis, lumbar region, with neurogenic claudication  (R53.81) Physical deconditioning  Comment: acute/ongoing, no change  Plan: PT and OT,epidural steroid injection 10/2/24, continue tylenol 1000mg TID and gabapentin 100mg TID, hydromorphone 2mg q 4 hours prn using approx once per day  DC cyclobenezeprine     (I10) Benign essential hypertension  (I48.20) Chronic atrial fibrillation (H)  (N18.30) Stage 3 chronic kidney disease, unspecified whether stage 3a or 3b CKD (H)  Comment: acute/ongoing, no change  Plan: BMP follow, continue diltiazem 240mg QD     (M06.9) Rheumatoid arthritis, involving unspecified site, unspecified whether rheumatoid factor present (H)  (M34.9) Scleroderma (H)  Comment: ongoing no change  Plan: PT and OT, continue pain medications as above, f/u with rheumatology as OP     (K59.03) drug induced constipation  Acute/ongoing, no change  Plan: senna s 1 PO BID scheduled, miralax 17gm QD prn       MED REC REQUIRED\F2  Post Medication Reconciliation Status: medication reconcilation previously completed during another office visit      Orders:  No new orders    Total time with patient visit: 48 minutes including discussions about the POC and care coordination with the patient. Greater than 50% of total time spent with counseling and coordinating care due to reviewed nursing and therapy progress notes, medications and lab results. Discussed pain control and  discharge plan with patient at bedside.   Electronically signed by: Tonya Lynn Haase, APRN CNP         Sincerely,        Tonya Lynn Haase, APRN CNP

## 2024-10-15 NOTE — PROGRESS NOTES
[facility] GERIATRICS DISCHARGE SUMMARY  PATIENT'S NAME: Marcia Gary  YOB: 1935  MEDICAL RECORD NUMBER:  [id number]  Place of Service where encounter took place:  [facility]) [25]    PRIMARY CARE PROVIDER AND CLINIC RESPONSIBLE AFTER TRANSFER:   Joseph Pelayo MD, [address] / [place] [address]    [facility] Provider     Transferring providers: Tonya Lynn Haase, SANDY BOWMAN, Pio Haley MD  Recent Hospitalization/ED:  [facility] Hospital stay 9/26/24 to 9/29/24.  Date of SNF Admission: September 29, 2024  Date of SNF (anticipated) Discharge: October 17, 2024  Discharged to: previous independent home  Cognitive Scores: BIMS: 14/15 and Short blessed: 0/28  Physical Function: Ambulating 500 ft with RW  DME: Walker    CODE STATUS/ADVANCE DIRECTIVES DISCUSSION:  Full Code   ALLERGIES: Smz-tmp ds [sulfamethoxazole-trimethoprim], Metoprolol, Amoxicillin, Amoxicillin-pot clavulanate, Cephalexin, Clavulanic acid, Sulfamethoxazole, Trimethoprim, and Doxycycline    NURSING FACILITY COURSE   Medication Changes/Rationale:   See assessment     Summary of nursing facility stay:   Patient progressed to walking up to 500 feet using a RW independently, independent with ADL's, will discharge to home with home PT, OT, HHA through [facility]    Discharge Medications:  MED REC REQUIRED  Post Medication Reconciliation Status: discharge medications reconciled and changed, per note/orders       Current Outpatient Medications   Medication Sig Dispense Refill    acetaminophen (TYLENOL) 500 MG tablet Take 2 tablets (1,000 mg) by mouth 3 times daily.      atorvastatin (LIPITOR) 20 MG tablet Take 1 tablet (20 mg) by mouth daily 30 tablet 11    diltiazem (CARTIA XT) 240 MG 24 hr capsule Take 1 capsule (240 mg) by mouth daily 90 capsule 3    gabapentin (NEURONTIN) 100 MG capsule Take 1 capsule (100 mg) by mouth 3 times daily.      HYDROmorphone (DILAUDID) 2 MG tablet Take 1  "tablet (2 mg) by mouth every 4 hours as needed for severe pain (IF pain not managed with non-pharmacological and non-opioid interventions). 30 tablet 0    Multiple Vitamins-Minerals (PRESERVISION AREDS 2 PO) Take 1 capsule by mouth 2 times daily      Omeprazole 20 MG TBDD Take 20 mg by mouth 2 times daily (before meals)      polyethylene glycol (MIRALAX) 17 GM/Dose powder Take 17 g by mouth daily as needed.      senna-docusate (SENOKOT-S/PERICOLACE) 8.6-50 MG tablet Take 1 tablet by mouth 2 times daily.            Controlled medications:   not applicable/none     Past Medical History:   Past Medical History:   Diagnosis Date    Atrial fibrillation (H)     Chronic kidney disease     Hypertension     Mitral regurgitation 2010 2010 Echo - mild-mod MR    Rheumatic fever     Rheumatoid arthritis (H)     Scleroderma (H)     Sleep apnea      Physical Exam:   Vitals: /73   Pulse 68   Temp 98.8  F (37.1  C)   Resp 14   Ht 1.6 m (5' 3\")   Wt 60.8 kg (134 lb)   SpO2 97%   BMI 23.74 kg/m    BMI: Body mass index is 23.74 kg/m .  GENERAL APPEARANCE:  Alert, in no distress  ENT:  Mouth and posterior oropharynx normal, moist mucous membranes, normal hearing acuity  EYES:  EOM, conjunctivae, lids, pupils and irises normal, PERRL  RESP:  respiratory effort and palpation of chest normal, lungs clear to auscultation , no respiratory distress  CV:  regular rate and rhythm, no murmur, rub, or gallop, peripheral edema trace+ in LE bilaterally  ABDOMEN:  normal bowel sounds, soft, nontender, no hepatosplenomegaly or other masses  M/S:   patient sitting up on edge of bed  SKIN:  Inspection of skin and subcutaneous tissue baseline  NEURO:   speech wnl  PSYCH:  affect and mood normal     SNF labs: Recent labs in [facility] reviewed by me today.  and Most Recent 3 CBC's:  Recent Labs   Lab Test 09/27/24  0609 09/26/24  1336 04/11/24  0608   WBC 7.3 6.7 9.1   HGB 12.4 12.6 10.3*    100 96    202 195     Most Recent 3 " BMP's:  Recent Labs   Lab Test 09/27/24  0609 09/26/24  1336 05/13/24  1028    140 140   POTASSIUM 4.4 4.5 4.2   CHLORIDE 104 105 108*   CO2 30* 26 24   BUN 20.4 24.6* 21.1   CR 0.89 1.03* 1.11*   ANIONGAP 7 9 8   ARABELLA 9.9 10.1 10.0   * 104* 103*       Assessment/Plan:  (M54.41) Acute bilateral low back pain with right-sided sciatica  (primary encounter diagnosis)  (M48.062) Spinal stenosis, lumbar region, with neurogenic claudication  (R53.81) Physical deconditioning  Comment: ongoing, improved  Plan: epidural steroid injection 10/2/24,   Discharge to home with home PT, OT and HHA through [facility], continue tylenol 1000mg TID and gabapentin 100mg TID, hydromorphone 2mg q 4 hours prn using approx once per day, sent # 10 tabs home, encouraged patient to wean off dilaudid  No further cyclobenezeprine     (I10) Benign essential hypertension  (I48.20) Chronic atrial fibrillation (H)  (N18.30) Stage 3 chronic kidney disease, unspecified whether stage 3a or 3b CKD (H)  Comment: ongoing  Plan: continue diltiazem 240mg QD     (M06.9) Rheumatoid arthritis, involving unspecified site, unspecified whether rheumatoid factor present (H)  (M34.9) Scleroderma (H)  Comment: ongoing   Plan: continue pain medications as above, f/u with rheumatology as OP     (K59.03) drug induced constipation  ongoing  Plan: senna s 1 PO BID scheduled, miralax 17gm QD prn       DISCHARGE PLAN:  Follow up labs: No labs orders/due  Medical Follow Up:      Follow up with primary care provider in 1-2 weeks  [facility] scheduled appointments:  Appointments in Next Year      Oct 16, 2024 7:30 AM  Discharge Summary with Tonya Lynn Haase, APRN CNP  [facility] ([facility] ) [phone] Nov 01, 2024 10:00 AM  (Arrive by 9:45 AM)  Return Adult Neurosurg with Narendra Angelo PA-C  [facility] Neurology [facility] ([facility] ) [phone]            Discharge Services: Home Care:  Occupational Therapy, Physical Therapy, and Home Health Aide  Discharge Instructions Verbalized to Patient at Discharge:   None    TOTAL DISCHARGE TIME:   Greater than 30 minutes  Electronically signed by:  Tonya Lynn Haase, APRN CNP

## 2024-10-16 ENCOUNTER — DISCHARGE SUMMARY NURSING HOME (OUTPATIENT)
Dept: GERIATRICS | Facility: CLINIC | Age: 89
End: 2024-10-16
Payer: MEDICARE

## 2024-10-16 VITALS
TEMPERATURE: 98.8 F | SYSTOLIC BLOOD PRESSURE: 108 MMHG | RESPIRATION RATE: 14 BRPM | OXYGEN SATURATION: 97 % | HEIGHT: 63 IN | BODY MASS INDEX: 23.74 KG/M2 | HEART RATE: 68 BPM | DIASTOLIC BLOOD PRESSURE: 73 MMHG | WEIGHT: 134 LBS

## 2024-10-16 DIAGNOSIS — I10 BENIGN ESSENTIAL HYPERTENSION: ICD-10-CM

## 2024-10-16 DIAGNOSIS — M54.41 ACUTE BILATERAL LOW BACK PAIN WITH RIGHT-SIDED SCIATICA: Primary | ICD-10-CM

## 2024-10-16 DIAGNOSIS — R53.81 PHYSICAL DECONDITIONING: ICD-10-CM

## 2024-10-16 DIAGNOSIS — M34.9 SCLERODERMA (H): ICD-10-CM

## 2024-10-16 DIAGNOSIS — I48.20 CHRONIC ATRIAL FIBRILLATION (H): ICD-10-CM

## 2024-10-16 DIAGNOSIS — M48.062 SPINAL STENOSIS, LUMBAR REGION, WITH NEUROGENIC CLAUDICATION: ICD-10-CM

## 2024-10-16 DIAGNOSIS — N18.30 STAGE 3 CHRONIC KIDNEY DISEASE, UNSPECIFIED WHETHER STAGE 3A OR 3B CKD (H): ICD-10-CM

## 2024-10-16 DIAGNOSIS — M06.9 RHEUMATOID ARTHRITIS, INVOLVING UNSPECIFIED SITE, UNSPECIFIED WHETHER RHEUMATOID FACTOR PRESENT (H): ICD-10-CM

## 2024-10-16 PROCEDURE — 99316 NF DSCHRG MGMT 30 MIN+: CPT | Performed by: NURSE PRACTITIONER

## 2024-10-16 NOTE — LETTER
10/16/2024      Marcia Gary  [address]  [place] [address]        [facility] GERIATRICS DISCHARGE SUMMARY  PATIENT'S NAME: Marcia Gary  YOB: 1935  MEDICAL RECORD NUMBER:  [id number]  [facility] [[facility]    PRIMARY CARE PROVIDER AND CLINIC RESPONSIBLE AFTER TRANSFER:   Joseph Pelayo MD, [address] / [place] [address]    [facility] Provider     Transferring providers: Tonya Lynn Haase, SANDY BOWMAN, Pio Haley MD  Recent Hospitalization/ED:  [facility] Hospital stay 9/26/24 to 9/29/24.  Date of SNF Admission: September 29, 2024  Date of SNF (anticipated) Discharge: October 17, 2024  Discharged to: previous independent home  Cognitive Scores: BIMS: 14/15 and Short blessed: 0/28  Physical Function: Ambulating 500 ft with RW  DME: Walker    CODE STATUS/ADVANCE DIRECTIVES DISCUSSION:  Full Code   ALLERGIES: Smz-tmp ds [sulfamethoxazole-trimethoprim], Metoprolol, Amoxicillin, Amoxicillin-pot clavulanate, Cephalexin, Clavulanic acid, Sulfamethoxazole, Trimethoprim, and Doxycycline    NURSING FACILITY COURSE   Medication Changes/Rationale:   See assessment     Summary of nursing facility stay:   Patient progressed to walking up to 500 feet using a RW independently, independent with ADL's, will discharge to home with home PT, OT, HHA through [facility]    Discharge Medications:  MED REC REQUIRED  Post Medication Reconciliation Status: discharge medications reconciled and changed, per note/orders       Current Outpatient Medications   Medication Sig Dispense Refill     acetaminophen (TYLENOL) 500 MG tablet Take 2 tablets (1,000 mg) by mouth 3 times daily.       atorvastatin (LIPITOR) 20 MG tablet Take 1 tablet (20 mg) by mouth daily 30 tablet 11     diltiazem (CARTIA XT) 240 MG 24 hr capsule Take 1 capsule (240 mg) by mouth daily 90 capsule 3     gabapentin (NEURONTIN) 100 MG capsule Take 1  "capsule (100 mg) by mouth 3 times daily.       HYDROmorphone (DILAUDID) 2 MG tablet Take 1 tablet (2 mg) by mouth every 4 hours as needed for severe pain (IF pain not managed with non-pharmacological and non-opioid interventions). 30 tablet 0     Multiple Vitamins-Minerals (PRESERVISION AREDS 2 PO) Take 1 capsule by mouth 2 times daily       Omeprazole 20 MG TBDD Take 20 mg by mouth 2 times daily (before meals)       polyethylene glycol (MIRALAX) 17 GM/Dose powder Take 17 g by mouth daily as needed.       senna-docusate (SENOKOT-S/PERICOLACE) 8.6-50 MG tablet Take 1 tablet by mouth 2 times daily.            Controlled medications:   not applicable/none     Past Medical History:   Past Medical History:   Diagnosis Date     Atrial fibrillation (H)      Chronic kidney disease      Hypertension      Mitral regurgitation 2010 2010 Echo - mild-mod MR     Rheumatic fever      Rheumatoid arthritis (H)      Scleroderma (H)      Sleep apnea      Physical Exam:   Vitals: /73   Pulse 68   Temp 98.8  F (37.1  C)   Resp 14   Ht 1.6 m (5' 3\")   Wt 60.8 kg (134 lb)   SpO2 97%   BMI 23.74 kg/m    BMI: Body mass index is 23.74 kg/m .  GENERAL APPEARANCE:  Alert, in no distress  ENT:  Mouth and posterior oropharynx normal, moist mucous membranes, normal hearing acuity  EYES:  EOM, conjunctivae, lids, pupils and irises normal, PERRL  RESP:  respiratory effort and palpation of chest normal, lungs clear to auscultation , no respiratory distress  CV:  regular rate and rhythm, no murmur, rub, or gallop, peripheral edema trace+ in LE bilaterally  ABDOMEN:  normal bowel sounds, soft, nontender, no hepatosplenomegaly or other masses  M/S:   patient sitting up on edge of bed  SKIN:  Inspection of skin and subcutaneous tissue baseline  NEURO:   speech wnl  PSYCH:  affect and mood normal     SNF labs: Recent labs in [facility] reviewed by me today.  and Most Recent 3 CBC's:  Recent Labs   Lab Test 09/27/24  0609 09/26/24  1336 " 04/11/24  0608   WBC 7.3 6.7 9.1   HGB 12.4 12.6 10.3*    100 96    202 195     Most Recent 3 BMP's:  Recent Labs   Lab Test 09/27/24  0609 09/26/24  1336 05/13/24  1028    140 140   POTASSIUM 4.4 4.5 4.2   CHLORIDE 104 105 108*   CO2 30* 26 24   BUN 20.4 24.6* 21.1   CR 0.89 1.03* 1.11*   ANIONGAP 7 9 8   ARABELLA 9.9 10.1 10.0   * 104* 103*       Assessment/Plan:  (M54.41) Acute bilateral low back pain with right-sided sciatica  (primary encounter diagnosis)  (M48.062) Spinal stenosis, lumbar region, with neurogenic claudication  (R53.81) Physical deconditioning  Comment: ongoing, improved  Plan: epidural steroid injection 10/2/24,   Discharge to home with home PT, OT and HHA through [facility], continue tylenol 1000mg TID and gabapentin 100mg TID, hydromorphone 2mg q 4 hours prn using approx once per day, sent # 10 tabs home, encouraged patient to wean off dilaudid  No further cyclobenezeprine     (I10) Benign essential hypertension  (I48.20) Chronic atrial fibrillation (H)  (N18.30) Stage 3 chronic kidney disease, unspecified whether stage 3a or 3b CKD (H)  Comment: ongoing  Plan: continue diltiazem 240mg QD     (M06.9) Rheumatoid arthritis, involving unspecified site, unspecified whether rheumatoid factor present (H)  (M34.9) Scleroderma (H)  Comment: ongoing   Plan: continue pain medications as above, f/u with rheumatology as OP     (K59.03) drug induced constipation  ongoing  Plan: senna s 1 PO BID scheduled, miralax 17gm QD prn       DISCHARGE PLAN:  Follow up labs: No labs orders/due  Medical Follow Up:      Follow up with primary care provider in 1-2 weeks  [facility] scheduled appointments:  Appointments in Next Year      Oct 16, 2024 7:30 AM  Discharge Summary with Tonya Lynn Haase, APRN CNP  [facility] Geriatrics ([facility] ) [phone] Nov 01, 2024 10:00 AM  (Arrive by 9:45 AM)  Return Adult Neurosurg with ROGER Sawyer  [facility] Neurology [facility] ([facility] ) [phone]           Discharge Services: Home Care:  Occupational Therapy, Physical Therapy, and Home Health Aide  Discharge Instructions Verbalized to Patient at Discharge:   None    TOTAL DISCHARGE TIME:   Greater than 30 minutes  Electronically signed by:  Tonya Lynn Haase, APRN CNP                   Sincerely,        Tonya Lynn Haase, APRN CNP

## 2024-11-01 ENCOUNTER — OFFICE VISIT (OUTPATIENT)
Dept: NEUROSURGERY | Facility: CLINIC | Age: 89
End: 2024-11-01
Payer: MEDICARE

## 2024-11-01 VITALS
SYSTOLIC BLOOD PRESSURE: 132 MMHG | BODY MASS INDEX: 23.25 KG/M2 | OXYGEN SATURATION: 100 % | WEIGHT: 131.25 LBS | HEART RATE: 77 BPM | DIASTOLIC BLOOD PRESSURE: 84 MMHG | HEIGHT: 63 IN

## 2024-11-01 DIAGNOSIS — M54.41 ACUTE RIGHT-SIDED LOW BACK PAIN WITH RIGHT-SIDED SCIATICA: Primary | ICD-10-CM

## 2024-11-01 PROCEDURE — 99213 OFFICE O/P EST LOW 20 MIN: CPT | Performed by: PHYSICIAN ASSISTANT

## 2024-11-01 ASSESSMENT — PAIN SCALES - GENERAL: PAINLEVEL_OUTOF10: MODERATE PAIN (5)

## 2024-11-01 NOTE — PROGRESS NOTES
Neurosurgery follow-up    Ms. Gary is a 88-year-old female who was seen in the hospital for right back and leg pain, found to have a right L2-3 disc extrusion, underwent right L2-3 transforaminal epidural steroid injection as an outpatient.  After about 48 hours her pain improved significantly, and today she is feeling much better now about a month out from her initial pain presentation.    She is doing physical therapy, and feeling that she is progressing well with that.    Exam    B/P: 132/84, T: Data Unavailable, P: 77, R: Data Unavailable     Alert and oriented no acute distress  Bilateral lower extremities with 5 out of 5 strength  Gait is normal  Negative straight leg raise bilaterally    Imaging    Lumbar MRI demonstrated     IMPRESSION:  1.  Multilevel spondylosis described above. Modic changes described above.     2.  L2-3, L3-4, L4-5 severe thecal sac stenosis.     3.  L2-L3: Bulge with bilateral foraminal extension. Superimposed right foraminal protrusion. Severe right foraminal stenosis with compression exiting right L2 nerve root.    Assessment    Status post right L2-3 transforaminal epidural steroid injection, with excellent results at this point.    L2-3 right superimposed foraminal protrusion with severe right L2 nerve compression.      Plan    Follow-up as needed if symptoms return, consider repeat epidural steroid injection at that point if needed.

## 2024-11-01 NOTE — LETTER
11/1/2024      Marcia Gary  [address] [address]      Dear Colleague,    Thank you for referring your patient, Marcia Gary, to the [facility] NEUROLOGY CLINICS [facility]. Please see a copy of my visit note below.    Neurosurgery follow-up    Ms. Gary is a 88-year-old female who was seen in the hospital for right back and leg pain, found to have a right L2-3 disc extrusion, underwent right L2-3 transforaminal epidural steroid injection as an outpatient.  After about 48 hours her pain improved significantly, and today she is feeling much better now about a month out from her initial pain presentation.    She is doing physical therapy, and feeling that she is progressing well with that.    Exam    B/P: 132/84, T: Data Unavailable, P: 77, R: Data Unavailable     Alert and oriented no acute distress  Bilateral lower extremities with 5 out of 5 strength  Gait is normal  Negative straight leg raise bilaterally    Imaging    Lumbar MRI demonstrated     IMPRESSION:  1.  Multilevel spondylosis described above. Modic changes described above.     2.  L2-3, L3-4, L4-5 severe thecal sac stenosis.     3.  L2-L3: Bulge with bilateral foraminal extension. Superimposed right foraminal protrusion. Severe right foraminal stenosis with compression exiting right L2 nerve root.    Assessment    Status post right L2-3 transforaminal epidural steroid injection, with excellent results at this point.    L2-3 right superimposed foraminal protrusion with severe right L2 nerve compression.      Plan    Follow-up as needed if symptoms return, consider repeat epidural steroid injection at that point if needed.          Again, thank you for allowing me to participate in the care of your patient.        Sincerely,        Narendra Angelo PA-C

## 2024-11-01 NOTE — NURSING NOTE
"Marcia Gary is a 88 year old female who presents for:  Chief Complaint   Patient presents with    RECHECK     right L2-3 foraminal stenosis Follow up from ED on 9/26/24        Initial Vitals:  /84   Pulse 77   Ht 5' 3\" (1.6 m)   Wt 131 lb 4 oz (59.5 kg)   SpO2 100%   BMI 23.25 kg/m   Estimated body mass index is 23.25 kg/m  as calculated from the following:    Height as of this encounter: 5' 3\" (1.6 m).    Weight as of this encounter: 131 lb 4 oz (59.5 kg).. Body surface area is 1.63 meters squared. BP completed using cuff size: small regular  Moderate Pain (5)        Kymberly Fraire   "

## 2025-04-10 ENCOUNTER — MEDICAL CORRESPONDENCE (OUTPATIENT)
Dept: HEALTH INFORMATION MANAGEMENT | Facility: CLINIC | Age: 89
End: 2025-04-10
Payer: MEDICARE

## 2025-04-10 ENCOUNTER — TRANSFERRED RECORDS (OUTPATIENT)
Dept: HEALTH INFORMATION MANAGEMENT | Facility: CLINIC | Age: 89
End: 2025-04-10
Payer: MEDICARE

## 2025-04-10 DIAGNOSIS — Z86.79 HISTORY OF ATRIAL FIBRILLATION: ICD-10-CM

## 2025-04-10 DIAGNOSIS — R06.09 DOE (DYSPNEA ON EXERTION): Primary | ICD-10-CM

## 2025-04-10 LAB
CREATININE (EXTERNAL): 1.07 MG/DL (ref 0.57–1)
GFR ESTIMATED (EXTERNAL): 50 ML/MIN/1.73
GLUCOSE (EXTERNAL): 94 MG/DL (ref 70–99)
POTASSIUM (EXTERNAL): 4.4 MMOL/L (ref 3.5–5.2)

## 2025-04-11 ENCOUNTER — HOSPITAL ENCOUNTER (OUTPATIENT)
Dept: CARDIOLOGY | Facility: CLINIC | Age: 89
Discharge: HOME | End: 2025-04-11
Attending: FAMILY MEDICINE | Admitting: FAMILY MEDICINE
Payer: MEDICARE

## 2025-04-11 DIAGNOSIS — R06.09 DOE (DYSPNEA ON EXERTION): ICD-10-CM

## 2025-04-11 DIAGNOSIS — Z86.79 HISTORY OF ATRIAL FIBRILLATION: ICD-10-CM

## 2025-04-11 LAB — LVEF ECHO: NORMAL

## 2025-04-11 PROCEDURE — 93306 TTE W/DOPPLER COMPLETE: CPT

## 2025-04-11 PROCEDURE — 93306 TTE W/DOPPLER COMPLETE: CPT | Mod: 26 | Performed by: INTERNAL MEDICINE

## 2025-04-17 ENCOUNTER — MEDICAL CORRESPONDENCE (OUTPATIENT)
Dept: HEALTH INFORMATION MANAGEMENT | Facility: CLINIC | Age: 89
End: 2025-04-17
Payer: MEDICARE

## 2025-04-17 DIAGNOSIS — D64.9 ANEMIA: ICD-10-CM

## 2025-04-17 DIAGNOSIS — I48.91 ATRIAL FIBRILLATION (H): ICD-10-CM

## 2025-04-17 DIAGNOSIS — R06.09 DOE (DYSPNEA ON EXERTION): ICD-10-CM

## 2025-04-17 DIAGNOSIS — R05.9 COUGH: Primary | ICD-10-CM

## 2025-04-23 ENCOUNTER — TRANSFERRED RECORDS (OUTPATIENT)
Dept: HEALTH INFORMATION MANAGEMENT | Facility: CLINIC | Age: 89
End: 2025-04-23
Payer: MEDICARE

## 2025-04-23 LAB
ABO + RH BLD: NORMAL
BLD GP AB SCN SERPL QL: NEGATIVE
SPECIMEN EXP DATE BLD: NORMAL

## 2025-04-24 ENCOUNTER — LAB (OUTPATIENT)
Dept: LAB | Facility: CLINIC | Age: 89
End: 2025-04-24
Payer: MEDICARE

## 2025-04-24 DIAGNOSIS — D62 ANEMIA DUE TO BLOOD LOSS, ACUTE: Primary | ICD-10-CM

## 2025-04-24 DIAGNOSIS — D62 ANEMIA DUE TO BLOOD LOSS, ACUTE: ICD-10-CM

## 2025-04-24 RX ORDER — HEPARIN SODIUM (PORCINE) LOCK FLUSH IV SOLN 100 UNIT/ML 100 UNIT/ML
5 SOLUTION INTRAVENOUS
OUTPATIENT
Start: 2025-04-25

## 2025-04-24 RX ORDER — EPINEPHRINE 1 MG/ML
0.3 INJECTION, SOLUTION INTRAMUSCULAR; SUBCUTANEOUS EVERY 5 MIN PRN
OUTPATIENT
Start: 2025-04-25

## 2025-04-24 RX ORDER — HEPARIN SODIUM,PORCINE 10 UNIT/ML
5-20 VIAL (ML) INTRAVENOUS DAILY PRN
OUTPATIENT
Start: 2025-04-25

## 2025-04-24 RX ORDER — DIPHENHYDRAMINE HYDROCHLORIDE 50 MG/ML
50 INJECTION, SOLUTION INTRAMUSCULAR; INTRAVENOUS
Start: 2025-04-25

## 2025-04-25 PROCEDURE — 85025 COMPLETE CBC W/AUTO DIFF WBC: CPT | Performed by: FAMILY MEDICINE

## 2025-04-25 PROCEDURE — 86900 BLOOD TYPING SEROLOGIC ABO: CPT | Performed by: FAMILY MEDICINE

## 2025-04-26 ENCOUNTER — TRANSFERRED RECORDS (OUTPATIENT)
Dept: HEALTH INFORMATION MANAGEMENT | Facility: CLINIC | Age: 89
End: 2025-04-26
Payer: MEDICARE

## 2025-05-05 ENCOUNTER — OFFICE VISIT (OUTPATIENT)
Dept: CARDIOLOGY | Facility: CLINIC | Age: 89
End: 2025-05-05
Attending: FAMILY MEDICINE
Payer: MEDICARE

## 2025-05-05 VITALS
BODY MASS INDEX: 25.34 KG/M2 | HEIGHT: 63 IN | DIASTOLIC BLOOD PRESSURE: 51 MMHG | HEART RATE: 63 BPM | SYSTOLIC BLOOD PRESSURE: 129 MMHG | WEIGHT: 143 LBS

## 2025-05-05 DIAGNOSIS — R06.09 DOE (DYSPNEA ON EXERTION): ICD-10-CM

## 2025-05-05 DIAGNOSIS — D62 ANEMIA DUE TO BLOOD LOSS, ACUTE: ICD-10-CM

## 2025-05-05 DIAGNOSIS — I48.20 CHRONIC ATRIAL FIBRILLATION (H): Primary | ICD-10-CM

## 2025-05-05 PROCEDURE — 3074F SYST BP LT 130 MM HG: CPT | Performed by: INTERNAL MEDICINE

## 2025-05-05 PROCEDURE — 99204 OFFICE O/P NEW MOD 45 MIN: CPT | Performed by: INTERNAL MEDICINE

## 2025-05-05 PROCEDURE — 3078F DIAST BP <80 MM HG: CPT | Performed by: INTERNAL MEDICINE

## 2025-05-05 RX ORDER — FERROUS SULFATE 325(65) MG
325 TABLET ORAL EVERY OTHER DAY
COMMUNITY

## 2025-05-05 NOTE — PROGRESS NOTES
HPI and Plan:     Marcia here for SOB over the past 6 weeks.  No cardiac history other than atrial fibrillation.   History of atrial fibrillation since 2008.  Previously on Xarelto taken off due to unexplained anemia stopped 4/17/25.      Back on every other day iron tablet.  No chest pain, chest pressure.  Occassional LE edema.      May be a candidate for LAAO if cannot be placed back on DOAC long term.  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  Discussed this with the patient.      Normal LVEF and only moderate MR does not explain symptoms.  Almost certainly symptoms are secondary to Hgb at 7 gm.  Recommend evaluation and work up of anemia.      Today's clinic visit entailed:  Review of the result(s) of each unique test - EKG, echocardiogram, labs  The following tests were independently interpreted by me as noted in my documentation: EKG  Discussion of management or test interpretation with external physician/other qualified healthcare professional/appropriate source -    35 minutes spent by me on the date of the encounter doing chart review, history and exam, documentation and further activities per the note  Provider  Link to [employer] Help Grid     The level of medical decision making during this visit was of moderate complexity.      No orders of the defined types were placed in this encounter.    Orders Placed This Encounter   Medications    ferrous sulfate (FEROSUL) 325 (65 Fe) MG tablet     Sig: Take 325 mg by mouth every other day.    Cyanocobalamin (B-12) 1000 MCG TBCR     Sig: Take by mouth.    FOLIC ACID PO     Sig: Take by mouth daily.     There are no discontinued medications.      Encounter Diagnoses   Name Primary?    Cough     LORENZO (dyspnea on exertion)     Atrial fibrillation (H)     Anemia        CURRENT MEDICATIONS:  Current Outpatient Medications   Medication Sig Dispense Refill    acetaminophen (TYLENOL) 500 MG tablet Take 500-1,000 mg by mouth every 8 hours as needed for mild pain.      atorvastatin (LIPITOR) 20 MG  tablet Take 1 tablet (20 mg) by mouth daily 30 tablet 11    Cyanocobalamin (B-12) 1000 MCG TBCR Take by mouth.      diltiazem (CARTIA XT) 240 MG 24 hr capsule Take 1 capsule (240 mg) by mouth daily 90 capsule 3    ferrous sulfate (FEROSUL) 325 (65 Fe) MG tablet Take 325 mg by mouth every other day.      FOLIC ACID PO Take by mouth daily.      Multiple Vitamins-Minerals (PRESERVISION AREDS 2 PO) Take 1 capsule by mouth 2 times daily.      Omeprazole 20 MG TBDD Take 20 mg by mouth 2 times daily (before meals)         ALLERGIES     Allergies   Allergen Reactions    Smz-Tmp Ds [Sulfamethoxazole-Trimethoprim] Rash     Itching. (800/160 mg twice day)    Metoprolol Hives    Amoxicillin     Amoxicillin-Pot Clavulanate     Cephalexin     Clavulanic Acid Other (See Comments)     Other reaction(s): Adverse reaction    Sulfamethoxazole     Trimethoprim     Doxycycline Nausea     Headaches        PAST MEDICAL HISTORY:  Past Medical History:   Diagnosis Date    Atrial fibrillation (H)     Chronic kidney disease     Hypertension     Mitral regurgitation 2010 2010 Echo - mild-mod MR    Rheumatic fever     Rheumatoid arthritis (H)     Scleroderma (H)     Sleep apnea        PAST SURGICAL HISTORY:  Past Surgical History:   Procedure Laterality Date    CORONARY ANGIOGRAPHY ADULT ORDER  7/2008    normal coronary arteries, mod MR       FAMILY HISTORY:  Family History   Problem Relation Age of Onset    Coronary Artery Disease No family hx of        SOCIAL HISTORY:  Social History     Socioeconomic History    Marital status:      Spouse name: None    Number of children: None    Years of education: None    Highest education level: None   Tobacco Use    Smoking status: Never    Smokeless tobacco: Never   Substance and Sexual Activity    Alcohol use: Yes     Comment: once per month     Social Drivers of Health     Financial Resource Strain: Low Risk  (9/29/2024)    Financial Resource Strain     Within the past 12 months, have you or  "your family members you live with been unable to get utilities (heat, electricity) when it was really needed?: No       Review of Systems:  Skin:        Eyes:       ENT:       Respiratory:       Cardiovascular:       Gastroenterology:      Genitourinary:       Musculoskeletal:       Neurologic:       Psychiatric:       Heme/Lymph/Imm:       Endocrine:         Physical Exam:  Vitals: /51   Pulse 63   Ht 1.6 m (5' 3\")   Wt 64.9 kg (143 lb)   BMI 25.33 kg/m      Constitutional:           Skin:             Head:           Eyes:           Lymph:      ENT:           Neck:           Respiratory:            Cardiac:                                                           GI:           Extremities and Muscular Skeletal:                 Neurological:           Psych:         Recent Lab Results:  LIPID RESULTS:  Lab Results   Component Value Date    CHOL 141 07/05/2019    HDL 67 07/05/2019    LDL 66 07/05/2019    TRIG 38 07/05/2019       LIVER ENZYME RESULTS:  Lab Results   Component Value Date    AST 19 09/26/2024    AST 27 07/05/2019    ALT 16 09/26/2024    ALT 14 07/05/2019       CBC RESULTS:  Lab Results   Component Value Date    WBC 4.5 04/25/2025    WBC 10.1 04/10/2015    RBC 2.59 (L) 04/25/2025    RBC 4.14 04/10/2015    HGB 7.1 (L) 04/25/2025    HGB 13.2 04/10/2015    HCT 24.8 (L) 04/25/2025    HCT 38.5 04/10/2015    MCV 96 04/25/2025    MCV 92.9 04/10/2015    MCH 27.4 04/25/2025    MCH 31.8 (A) 04/10/2015    MCHC 28.6 (L) 04/25/2025    MCHC 34.2 04/10/2015    RDW 16.9 (H) 04/25/2025    RDW 14.1 04/10/2015     04/25/2025     04/10/2015     03/26/2010       BMP RESULTS:  Lab Results   Component Value Date     09/27/2024     07/05/2019    POTASSIUM 4.4 09/27/2024    POTASSIUM 4.9 07/05/2019    CHLORIDE 104 09/27/2024    CHLORIDE 107 07/05/2019    CO2 30 (H) 09/27/2024    CO2 27 07/05/2019    ANIONGAP 7 09/27/2024    ANIONGAP 6 03/26/2010     (H) 09/27/2024    GLC 95 " "07/05/2019    BUN 20.4 09/27/2024    BUN 25 07/05/2019    CR 0.89 09/27/2024    CR 1.07 07/05/2019    GFRESTIMATED 63 09/27/2024    GFRESTIMATED 49 07/05/2019    GFRESTBLACK 56 07/05/2019    ARABELLA 9.9 09/27/2024    ARABELLA 9.6 07/05/2019        A1C RESULTS:  No results found for: \"A1C\"    INR RESULTS:  Lab Results   Component Value Date    INR 1.44 (H) 07/22/2008    INR 1.53 (H) 07/02/2008           CC  Ro Nichols PA-C  [address]  [place] [id number]                "

## 2025-05-05 NOTE — LETTER
5/5/2025    Gage Helms MD  [address]  [place] [address]    RE: Marcia COOMBS Abdirahman       Dear Colleague,     I had the pleasure of seeing Marcia CORIN Gary in the [facility].  HPI and Plan:     Marcia here for SOB over the past 6 weeks.  No cardiac history other than atrial fibrillation.   History of atrial fibrillation since 2008.  Previously on Xarelto taken off due to unexplained anemia stopped 4/17/25.      Back on every other day iron tablet.  No chest pain, chest pressure.  Occassional LE edema.      May be a candidate for LAAO if cannot be placed back on DOAC long term.  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  Discussed this with the patient.      Normal LVEF and only moderate MR does not explain symptoms.  Almost certainly symptoms are secondary to Hgb at 7 gm.  Recommend evaluation and work up of anemia.      Today's clinic visit entailed:  Review of the result(s) of each unique test - EKG, echocardiogram, labs  The following tests were independently interpreted by me as noted in my documentation: EKG  Discussion of management or test interpretation with external physician/other qualified healthcare professional/appropriate source -    35 minutes spent by me on the date of the encounter doing chart review, history and exam, documentation and further activities per the note  Provider  Link to MDM Help Grid     The level of medical decision making during this visit was of moderate complexity.      No orders of the defined types were placed in this encounter.    Orders Placed This Encounter   Medications     ferrous sulfate (FEROSUL) 325 (65 Fe) MG tablet     Sig: Take 325 mg by mouth every other day.     Cyanocobalamin (B-12) 1000 MCG TBCR     Sig: Take by mouth.     FOLIC ACID PO     Sig: Take by mouth daily.     There are no discontinued medications.      Encounter Diagnoses   Name Primary?     Cough      LORENZO (dyspnea on exertion)      Atrial fibrillation (H)      Anemia        CURRENT  MEDICATIONS:  Current Outpatient Medications   Medication Sig Dispense Refill     acetaminophen (TYLENOL) 500 MG tablet Take 500-1,000 mg by mouth every 8 hours as needed for mild pain.       atorvastatin (LIPITOR) 20 MG tablet Take 1 tablet (20 mg) by mouth daily 30 tablet 11     Cyanocobalamin (B-12) 1000 MCG TBCR Take by mouth.       diltiazem (CARTIA XT) 240 MG 24 hr capsule Take 1 capsule (240 mg) by mouth daily 90 capsule 3     ferrous sulfate (FEROSUL) 325 (65 Fe) MG tablet Take 325 mg by mouth every other day.       FOLIC ACID PO Take by mouth daily.       Multiple Vitamins-Minerals (PRESERVISION AREDS 2 PO) Take 1 capsule by mouth 2 times daily.       Omeprazole 20 MG TBDD Take 20 mg by mouth 2 times daily (before meals)         ALLERGIES     Allergies   Allergen Reactions     Smz-Tmp Ds [Sulfamethoxazole-Trimethoprim] Rash     Itching. (800/160 mg twice day)     Metoprolol Hives     Amoxicillin      Amoxicillin-Pot Clavulanate      Cephalexin      Clavulanic Acid Other (See Comments)     Other reaction(s): Adverse reaction     Sulfamethoxazole      Trimethoprim      Doxycycline Nausea     Headaches        PAST MEDICAL HISTORY:  Past Medical History:   Diagnosis Date     Atrial fibrillation (H)      Chronic kidney disease      Hypertension      Mitral regurgitation 2010 2010 Echo - mild-mod MR     Rheumatic fever      Rheumatoid arthritis (H)      Scleroderma (H)      Sleep apnea        PAST SURGICAL HISTORY:  Past Surgical History:   Procedure Laterality Date     CORONARY ANGIOGRAPHY ADULT ORDER  7/2008    normal coronary arteries, mod MR       FAMILY HISTORY:  Family History   Problem Relation Age of Onset     Coronary Artery Disease No family hx of        SOCIAL HISTORY:  Social History     Socioeconomic History     Marital status:      Spouse name: None     Number of children: None     Years of education: None     Highest education level: None   Tobacco Use     Smoking status: Never      "Smokeless tobacco: Never   Substance and Sexual Activity     Alcohol use: Yes     Comment: once per month     Social Drivers of Health     Financial Resource Strain: Low Risk  (9/29/2024)    Financial Resource Strain      Within the past 12 months, have you or your family members you live with been unable to get utilities (heat, electricity) when it was really needed?: No       Review of Systems:  Skin:        Eyes:       ENT:       Respiratory:       Cardiovascular:       Gastroenterology:      Genitourinary:       Musculoskeletal:       Neurologic:       Psychiatric:       Heme/Lymph/Imm:       Endocrine:         Physical Exam:  Vitals: /51   Pulse 63   Ht 1.6 m (5' 3\")   Wt 64.9 kg (143 lb)   BMI 25.33 kg/m      Constitutional:           Skin:             Head:           Eyes:           Lymph:      ENT:           Neck:           Respiratory:            Cardiac:                                                           GI:           Extremities and Muscular Skeletal:                 Neurological:           Psych:         Recent Lab Results:  LIPID RESULTS:  Lab Results   Component Value Date    CHOL 141 07/05/2019    HDL 67 07/05/2019    LDL 66 07/05/2019    TRIG 38 07/05/2019       LIVER ENZYME RESULTS:  Lab Results   Component Value Date    AST 19 09/26/2024    AST 27 07/05/2019    ALT 16 09/26/2024    ALT 14 07/05/2019       CBC RESULTS:  Lab Results   Component Value Date    WBC 4.5 04/25/2025    WBC 10.1 04/10/2015    RBC 2.59 (L) 04/25/2025    RBC 4.14 04/10/2015    HGB 7.1 (L) 04/25/2025    HGB 13.2 04/10/2015    HCT 24.8 (L) 04/25/2025    HCT 38.5 04/10/2015    MCV 96 04/25/2025    MCV 92.9 04/10/2015    MCH 27.4 04/25/2025    MCH 31.8 (A) 04/10/2015    MCHC 28.6 (L) 04/25/2025    MCHC 34.2 04/10/2015    RDW 16.9 (H) 04/25/2025    RDW 14.1 04/10/2015     04/25/2025     04/10/2015     03/26/2010       BMP RESULTS:  Lab Results   Component Value Date     09/27/2024    NA " "143 07/05/2019    POTASSIUM 4.4 09/27/2024    POTASSIUM 4.9 07/05/2019    CHLORIDE 104 09/27/2024    CHLORIDE 107 07/05/2019    CO2 30 (H) 09/27/2024    CO2 27 07/05/2019    ANIONGAP 7 09/27/2024    ANIONGAP 6 03/26/2010     (H) 09/27/2024    GLC 95 07/05/2019    BUN 20.4 09/27/2024    BUN 25 07/05/2019    CR 0.89 09/27/2024    CR 1.07 07/05/2019    GFRESTIMATED 63 09/27/2024    GFRESTIMATED 49 07/05/2019    GFRESTBLACK 56 07/05/2019    ARABELLA 9.9 09/27/2024    ARABELLA 9.6 07/05/2019        A1C RESULTS:  No results found for: \"A1C\"    INR RESULTS:  Lab Results   Component Value Date    INR 1.44 (H) 07/22/2008    INR 1.53 (H) 07/02/2008           CC  Ro Nichols PA-C  [address]  [place] [id number]                  Thank you for allowing me to participate in the care of your patient.      Sincerely,     CHINEDU HELMS MD     [facility]  cc:   Ro Nichols PA-C  [address]  [place] [id number]      " Preventive measure Preventive measure

## 2025-06-20 ENCOUNTER — ANCILLARY PROCEDURE (OUTPATIENT)
Dept: ULTRASOUND IMAGING | Facility: CLINIC | Age: 89
End: 2025-06-20
Attending: FAMILY MEDICINE
Payer: MEDICARE

## 2025-06-20 DIAGNOSIS — M79.661 RIGHT CALF PAIN: ICD-10-CM

## 2025-06-20 PROCEDURE — 93971 EXTREMITY STUDY: CPT | Mod: RT
